# Patient Record
Sex: FEMALE | Race: WHITE | NOT HISPANIC OR LATINO | Employment: OTHER | URBAN - METROPOLITAN AREA
[De-identification: names, ages, dates, MRNs, and addresses within clinical notes are randomized per-mention and may not be internally consistent; named-entity substitution may affect disease eponyms.]

---

## 2019-01-23 ENCOUNTER — TELEPHONE (OUTPATIENT)
Dept: INTERNAL MEDICINE CLINIC | Facility: CLINIC | Age: 59
End: 2019-01-23

## 2019-01-23 NOTE — TELEPHONE ENCOUNTER
SHE STATED THAT SHE HAS A LOT OF LITTLE ISSUES BUT THAT IT'S BEEN A VERY LONG TIME SINCE SHE'S HAD AN ACTUAL PHYSICAL     GAVE APPT FOR Monday AM, SHE STATED THAT SHE'LL BE HERE

## 2019-01-28 ENCOUNTER — OFFICE VISIT (OUTPATIENT)
Dept: INTERNAL MEDICINE CLINIC | Facility: CLINIC | Age: 59
End: 2019-01-28
Payer: MEDICARE

## 2019-01-28 VITALS
TEMPERATURE: 98.6 F | DIASTOLIC BLOOD PRESSURE: 84 MMHG | OXYGEN SATURATION: 98 % | HEART RATE: 86 BPM | RESPIRATION RATE: 16 BRPM | BODY MASS INDEX: 49.67 KG/M2 | HEIGHT: 60 IN | WEIGHT: 253 LBS | SYSTOLIC BLOOD PRESSURE: 126 MMHG

## 2019-01-28 DIAGNOSIS — M19.90 ARTHRITIS: ICD-10-CM

## 2019-01-28 DIAGNOSIS — Z12.39 SCREENING FOR BREAST CANCER: ICD-10-CM

## 2019-01-28 DIAGNOSIS — E11.9 TYPE 2 DIABETES MELLITUS WITHOUT COMPLICATION, WITHOUT LONG-TERM CURRENT USE OF INSULIN (HCC): Primary | ICD-10-CM

## 2019-01-28 DIAGNOSIS — Z13.220 SCREENING FOR HYPERLIPIDEMIA: ICD-10-CM

## 2019-01-28 DIAGNOSIS — B37.3 YEAST INFECTION INVOLVING THE VAGINA AND SURROUNDING AREA: ICD-10-CM

## 2019-01-28 DIAGNOSIS — F33.41 RECURRENT MAJOR DEPRESSIVE DISORDER, IN PARTIAL REMISSION (HCC): ICD-10-CM

## 2019-01-28 DIAGNOSIS — Z01.419 ENCOUNTER FOR ANNUAL ROUTINE GYNECOLOGICAL EXAMINATION: ICD-10-CM

## 2019-01-28 DIAGNOSIS — I25.119 CHEST PAIN DUE TO CAD (HCC): ICD-10-CM

## 2019-01-28 DIAGNOSIS — R93.9 DIAGNOSTIC IMAGING INCONCLUSIVE DUE TO EXCESS BODY FAT OF PATIENT: ICD-10-CM

## 2019-01-28 DIAGNOSIS — Z23 ENCOUNTER FOR IMMUNIZATION: ICD-10-CM

## 2019-01-28 DIAGNOSIS — Z12.11 SCREENING FOR COLON CANCER: ICD-10-CM

## 2019-01-28 DIAGNOSIS — Z87.01 HISTORY OF BACTERIAL PNEUMONIA: ICD-10-CM

## 2019-01-28 DIAGNOSIS — E55.9 VITAMIN D DEFICIENCY: ICD-10-CM

## 2019-01-28 DIAGNOSIS — E03.9 ACQUIRED HYPOTHYROIDISM: ICD-10-CM

## 2019-01-28 LAB — SL AMB POCT HEMOGLOBIN AIC: 8.6 (ref ?–6.5)

## 2019-01-28 PROCEDURE — 93000 ELECTROCARDIOGRAM COMPLETE: CPT | Performed by: INTERNAL MEDICINE

## 2019-01-28 PROCEDURE — 99205 OFFICE O/P NEW HI 60 MIN: CPT | Performed by: INTERNAL MEDICINE

## 2019-01-28 PROCEDURE — 90732 PPSV23 VACC 2 YRS+ SUBQ/IM: CPT

## 2019-01-28 PROCEDURE — G0009 ADMIN PNEUMOCOCCAL VACCINE: HCPCS

## 2019-01-28 PROCEDURE — 83036 HEMOGLOBIN GLYCOSYLATED A1C: CPT | Performed by: INTERNAL MEDICINE

## 2019-01-28 RX ORDER — METFORMIN HYDROCHLORIDE 750 MG/1
750 TABLET, EXTENDED RELEASE ORAL
Qty: 90 TABLET | Refills: 2 | Status: SHIPPED | OUTPATIENT
Start: 2019-01-28 | End: 2020-01-13 | Stop reason: SDUPTHER

## 2019-01-28 RX ORDER — FLUCONAZOLE 150 MG/1
150 TABLET ORAL ONCE
Qty: 1 TABLET | Refills: 0 | Status: SHIPPED | OUTPATIENT
Start: 2019-01-28 | End: 2019-01-28

## 2019-01-28 RX ORDER — FLUCONAZOLE 150 MG/1
150 TABLET ORAL ONCE
Qty: 1 TABLET | Refills: 0 | Status: CANCELLED | OUTPATIENT
Start: 2019-01-28 | End: 2019-01-28

## 2019-01-28 NOTE — PROGRESS NOTES
Assessment/Plan:    #DM  -A1c in office elevated 8 6  -reports that she has lost about 50 lbs over the past year  -will start on 750mg metformin daily  -advised patient to diet and exercise  -patient will return in 4 months with a1c recheck  -will need to consider adding on GLP-1 or SGLT-2  -states that she will see ophthalmology whenever her insurance card arrives  -diabetic foot exam to be performed at next visit    #Chest Pain  -reports intermittent midsternal pain every 3 months  -not associated with activity  -EKG reveals NSR 60 with poor R wave progression and low voltage  -will obtain chemical stress test    #Yeast Infection  -likely secondary to uncontrolled diabetes  -denies discharge however reports uncomfortable vaginal itching  -has been taking vagisil topical with positive results  -will start on fluconazole x1    #Lipoma  -noted on right forehead  -will continue to monitor    #Arthritis Hands  -tender joints with AM stiffness that improves with movement  -single nodule noted on index finger MCP  -patient deferred tylenol and NSAID therapy  -will obtain DIOGENES, anti RF, anti CCP and XR hands    #Hypothyroidism  -previously on levothyroxine  -will recheck TSH    #Depression  -significant history of  -previously underwent ECT, lithium therapy, wellbutrin, amitryptylline, xanax with psychiatry however states that it made her feel sedated and she stopped all meds  -denies suicidal or homicidal ideation  -deferred antidepressant use  -encouraged her to talk to her  and to remain active in her Jehovah's witness    #PNA  -hospitalized in the past  -PNA 23 vaccine administered today    #Right Knee Meniscus Surgery  -right knee cartilage damage secondary to weight  -encouraged weight loss    #Fibromyalgia  -reports diffuse body pain and had been on amitryptylline however will continue to monitor off medications    #Previous Tracheostomy as Infant  -previous scar noted    #Health Maintenance  -return to care 4 months with a1c recheck in office  -deferred flu vaccine  -PNA 23 vaccine administered today  -mammogram, colonoscopy, ob/gyn referral given to patient    Addendum 2/4/19 mammogram with 3D and cad and US reveals 2 focal asymmetries in RUQ of right breast  She will need repeat mammogram in 6 months (August 2019)  Voicemail left to inform patient of need for repeat testing  Addendum 5/14/19 patient was seen by ophthalmology and will continue with yearly monitoring  No problem-specific Assessment & Plan notes found for this encounter  Diagnoses and all orders for this visit:    Type 2 diabetes mellitus without complication, without long-term current use of insulin (HCC)  -     CBC and differential; Future  -     Vitamin D 25 hydroxy; Future  -     Lipid Panel with Direct LDL reflex; Future  -     TSH, 3rd generation with Free T4 reflex; Future  -     Comprehensive metabolic panel; Future  -     POCT hemoglobin A1c  -     metFORMIN (GLUCOPHAGE-XR) 750 mg 24 hr tablet; Take 1 tablet (750 mg total) by mouth daily with breakfast for 90 days    Arthritis  -     CBC and differential; Future  -     Vitamin D 25 hydroxy; Future  -     Lipid Panel with Direct LDL reflex; Future  -     TSH, 3rd generation with Free T4 reflex; Future  -     Comprehensive metabolic panel; Future  -     POCT hemoglobin A1c  -     XR hand 2 vw left; Future  -     XR hand 2 vw right; Future  -     Cyclic citrul peptide antibody, IgG; Future  -     RF Screen w/ Reflex to Titer; Future  -     DIOGENES SCR, IFA W/REFL TITER/ PATTERN/MCT PNL 1; Future    Recurrent major depressive disorder, in partial remission (HCC)  -     CBC and differential; Future  -     Vitamin D 25 hydroxy; Future  -     Lipid Panel with Direct LDL reflex; Future  -     TSH, 3rd generation with Free T4 reflex; Future  -     Comprehensive metabolic panel;  Future  -     POCT hemoglobin A1c    Acquired hypothyroidism  -     CBC and differential; Future  -     Vitamin D 25 hydroxy; Future  -     Lipid Panel with Direct LDL reflex; Future  -     TSH, 3rd generation with Free T4 reflex; Future  -     Comprehensive metabolic panel; Future  -     POCT hemoglobin A1c    History of bacterial pneumonia  -     CBC and differential; Future  -     Vitamin D 25 hydroxy; Future  -     Lipid Panel with Direct LDL reflex; Future  -     TSH, 3rd generation with Free T4 reflex; Future  -     Comprehensive metabolic panel; Future  -     POCT hemoglobin A1c  -     PNEUMOCOCCAL POLYSACCHARIDE VACCINE 23-VALENT =>3YO SQ IM  -     Mammo diagnostic bilateral w cad; Future    Screening for breast cancer  -     CBC and differential; Future  -     Vitamin D 25 hydroxy; Future  -     Lipid Panel with Direct LDL reflex; Future  -     TSH, 3rd generation with Free T4 reflex; Future  -     Comprehensive metabolic panel; Future  -     POCT hemoglobin A1c    Screening for colon cancer  -     CBC and differential; Future  -     Vitamin D 25 hydroxy; Future  -     Lipid Panel with Direct LDL reflex; Future  -     TSH, 3rd generation with Free T4 reflex; Future  -     Comprehensive metabolic panel; Future  -     POCT hemoglobin A1c  -     Ambulatory referral to Colorectal Surgery; Future    Vitamin D deficiency  -     CBC and differential; Future  -     Vitamin D 25 hydroxy; Future  -     Lipid Panel with Direct LDL reflex; Future  -     TSH, 3rd generation with Free T4 reflex; Future  -     Comprehensive metabolic panel; Future  -     POCT hemoglobin A1c    Chest pain due to CAD  -     CBC and differential; Future  -     Vitamin D 25 hydroxy; Future  -     Lipid Panel with Direct LDL reflex; Future  -     TSH, 3rd generation with Free T4 reflex; Future  -     Comprehensive metabolic panel; Future  -     POCT hemoglobin A1c  -     POCT ECG  -     Echo stress test w contrast if indicated; Future    Screening for hyperlipidemia  -     CBC and differential; Future  -     Vitamin D 25 hydroxy;  Future  -     Lipid Panel with Direct LDL reflex; Future  -     TSH, 3rd generation with Free T4 reflex; Future  -     Comprehensive metabolic panel; Future  -     POCT hemoglobin A1c    Encounter for annual routine gynecological examination  -     CBC and differential; Future  -     Vitamin D 25 hydroxy; Future  -     Lipid Panel with Direct LDL reflex; Future  -     TSH, 3rd generation with Free T4 reflex; Future  -     Comprehensive metabolic panel; Future  -     POCT hemoglobin A1c  -     Ambulatory referral to Obstetrics / Gynecology; Future    Yeast infection involving the vagina and surrounding area  -     fluconazole (DIFLUCAN) 150 mg tablet; Take 1 tablet (150 mg total) by mouth once for 1 dose    Encounter for immunization   -     PNEUMOCOCCAL POLYSACCHARIDE VACCINE 23-VALENT =>3YO SQ IM    Diagnostic imaging inconclusive due to excess body fat of patient   -     Mammo diagnostic bilateral w cad; Future    Other orders  -     Cancel: fluconazole (DIFLUCAN) 150 mg tablet; Take 1 tablet (150 mg total) by mouth once for 1 dose            Current Outpatient Prescriptions:     fluconazole (DIFLUCAN) 150 mg tablet, Take 1 tablet (150 mg total) by mouth once for 1 dose, Disp: 1 tablet, Rfl: 0    metFORMIN (GLUCOPHAGE-XR) 750 mg 24 hr tablet, Take 1 tablet (750 mg total) by mouth daily with breakfast for 90 days, Disp: 90 tablet, Rfl: 2    Subjective:      Patient ID: Jennifer Archer is a 63 y o  female  HPI     Patient presents as a new patient visit  Reports that she has approved wrong history of depression and had been on electroconvulsive therapy with her psychiatrist   Also reports that she had been on lithium in the past with Wellbutrin as well as Xanax and amitriptyline however she discontinued all these medications stating that they made her feel sedated  She states that she continues to amin depression on a daily basis however denies any suicide or homicidal ideation    We offered to resume her on her previous antidepressants however she deferred at this time stating that she does not want anything  We encouraged her to continue to discuss her issues with her  and to discuss any depressive symptoms that she may have  Patient also notes a history of fibromyalgia and had been on amitriptyline in the past however never saw a rheumatologist   She states that this was controlled by her primary care  Patient also reports a history of vaginal yeast infection and denies any discharge at this time  She states that she is currently on nystatin topical cream which is cleaning out the area and making it feel better  She states that she has significant pruritus  We will start her on fluconazole at this time to see if this will give her relief  Patient's A1c was elevated at 8 6 today patient has a previous history of diabetes which she was previously on metformin  We will resume her metformin at this time and encouraged her to diet and exercise  Patient reports that she is currently cutting back on carbohydrates  Patient also complains of bilateral wrist pain with a nodule on her MCP of her right index finger  We will obtain x-ray imaging and as well as RF, CCP, DIOGENES levels  Patient deferred any pain medication including over-the-counter Tylenol or NSAIDs at this time  She states that she can't tolerate the pain  Patient also reports a history of pneumonia and we will administer the Pneumovax vaccine today  Patient also reports a history of intermittent chest pain  She states that it comes intermittently approximately every 3 months and is not relieved with rest or with massage  EKG in the office today reveals normal sinus rhythm with poor R-wave progression and low voltage  We will obtain a chemical stress test     Patient's past medical history positive for depression, diabetes, hypothyroidism, pneumonia, fibromyalgia  She is not currently on any medications  She has allergies to Wellbutrin and penicillin    Surgery wise she had a tracheostomy when she was an infant as well as right medial meniscus surgery  She reports a family history with a brother with asthma, and a father with pneumonitis  Social history is positive for alcohol approximately 1-2 drinks per week however she states that she quit smoking years ago and denies any drug use  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,   Currently patient reports that she is not exercising and we encouraged her to walk at least 30 min every day  She states that she is eating a well-balanced diet and cutting back on carbohydrates and sugars  We offered to administer the flu vaccine today however patient deferred  Patient reports that she has not received a pneumonia vaccine and we gave her the Pneumovax today  Patient also has not had her colonoscopy completed yet and we gave her the referral for that  Patient has never obtained a mammogram before and we will give her referral for that  She has not seen OB Gyne in many years for her annual Pap and pelvic exam and we will refer her to OB Gyne at this time  Patient will return in 4 months with A1c to be checked in the office  The following portions of the patient's history were reviewed and updated as appropriate: allergies, current medications, past family history, past medical history, past social history, past surgical history and problem list     Review of Systems   Constitutional: Negative for activity change, appetite change, chills, fatigue and fever  HENT: Negative for congestion, ear pain, facial swelling, hearing loss, sore throat, tinnitus and trouble swallowing  Eyes: Negative for photophobia and visual disturbance  Respiratory: Negative for cough, shortness of breath and wheezing  Cardiovascular: Positive for chest pain  Negative for leg swelling  Gastrointestinal: Negative for abdominal distention, abdominal pain, blood in stool, nausea and vomiting  Genitourinary: Negative for difficulty urinating, dysuria, pelvic pain, vaginal bleeding, vaginal discharge and vaginal pain  Vaginal itch   Musculoskeletal: Positive for arthralgias (b/l hand pain with nodule on right index finger), back pain (chronic lower back pain) and myalgias (fibromyalgia)  Negative for gait problem, joint swelling, neck pain and neck stiffness  Skin: Negative for rash and wound  Nodule on forehead   Neurological: Negative for dizziness, tremors, light-headedness and headaches  Psychiatric/Behavioral: Negative for suicidal ideas  The patient is not nervous/anxious  Depression         Objective:      /84   Pulse 86   Temp 98 6 °F (37 °C) (Tympanic)   Resp 16   Ht 5' (1 524 m)   Wt 115 kg (253 lb)   SpO2 98%   BMI 49 41 kg/m²          Physical Exam   Constitutional: She is oriented to person, place, and time  She appears well-developed and well-nourished  HENT:   Head: Normocephalic and atraumatic  Right Ear: External ear normal    Left Ear: External ear normal    Nose: Nose normal    Mouth/Throat: Oropharynx is clear and moist    Eyes: Pupils are equal, round, and reactive to light  Conjunctivae and EOM are normal    Neck: Normal range of motion  Neck supple  No JVD present  No thyromegaly present  Cardiovascular: Normal rate, regular rhythm and intact distal pulses  No murmur heard  Diminished heart sounds   Pulmonary/Chest: Effort normal and breath sounds normal  No stridor  No respiratory distress  She has no wheezes  She exhibits no tenderness  Abdominal: Soft  Bowel sounds are normal  She exhibits no distension  There is no tenderness  There is no rebound and no guarding  Musculoskeletal: Normal range of motion  She exhibits tenderness (b/l MCP hands with nodule on right index finger, lumbar pain, right knee medial meniscus pain)  She exhibits no edema or deformity  Lymphadenopathy:     She has no cervical adenopathy  Neurological: She is alert and oriented to person, place, and time  She has normal reflexes  She displays normal reflexes  No cranial nerve deficit  Coordination normal    Skin: Skin is warm  No rash noted  No erythema  Vitals reviewed

## 2019-01-29 ENCOUNTER — HOSPITAL ENCOUNTER (OUTPATIENT)
Dept: MAMMOGRAPHY | Facility: CLINIC | Age: 59
Discharge: HOME | End: 2019-01-29
Payer: COMMERCIAL

## 2019-01-29 VITALS — WEIGHT: 253 LBS | BODY MASS INDEX: 49.67 KG/M2 | HEIGHT: 60 IN

## 2019-01-29 DIAGNOSIS — Z12.39 SCREENING BREAST EXAMINATION: ICD-10-CM

## 2019-01-29 PROCEDURE — 77067 SCR MAMMO BI INCL CAD: CPT

## 2019-01-29 PROCEDURE — 77063 BREAST TOMOSYNTHESIS BI: CPT

## 2019-02-01 ENCOUNTER — HOSPITAL ENCOUNTER (OUTPATIENT)
Dept: ULTRASOUND IMAGING | Facility: CLINIC | Age: 59
Discharge: HOME | End: 2019-02-01
Payer: COMMERCIAL

## 2019-02-01 ENCOUNTER — HOSPITAL ENCOUNTER (OUTPATIENT)
Dept: MAMMOGRAPHY | Facility: CLINIC | Age: 59
Discharge: HOME | End: 2019-02-01
Payer: COMMERCIAL

## 2019-02-01 VITALS — BODY MASS INDEX: 49.67 KG/M2 | WEIGHT: 253 LBS | HEIGHT: 60 IN

## 2019-02-01 DIAGNOSIS — R92.8 ABNORMAL MAMMOGRAM: ICD-10-CM

## 2019-02-01 PROCEDURE — 77065 DX MAMMO INCL CAD UNI: CPT

## 2019-02-01 PROCEDURE — G0279 TOMOSYNTHESIS, MAMMO: HCPCS

## 2019-02-01 PROCEDURE — 76642 ULTRASOUND BREAST LIMITED: CPT

## 2019-02-04 PROBLEM — R93.9 DIAGNOSTIC IMAGING INCONCLUSIVE DUE TO EXCESS BODY FAT OF PATIENT: Status: ACTIVE | Noted: 2019-02-04

## 2019-05-10 LAB
LEFT EYE DIABETIC RETINOPATHY: NORMAL
RIGHT EYE DIABETIC RETINOPATHY: NORMAL

## 2019-05-24 PROBLEM — E66.01 MORBID OBESITY (HCC): Status: ACTIVE | Noted: 2019-05-24

## 2020-01-13 ENCOUNTER — OFFICE VISIT (OUTPATIENT)
Dept: INTERNAL MEDICINE CLINIC | Facility: CLINIC | Age: 60
End: 2020-01-13
Payer: COMMERCIAL

## 2020-01-13 VITALS
HEART RATE: 75 BPM | RESPIRATION RATE: 16 BRPM | HEIGHT: 60 IN | BODY MASS INDEX: 49.39 KG/M2 | WEIGHT: 251.6 LBS | DIASTOLIC BLOOD PRESSURE: 76 MMHG | OXYGEN SATURATION: 97 % | SYSTOLIC BLOOD PRESSURE: 126 MMHG

## 2020-01-13 DIAGNOSIS — Z01.419 ENCOUNTER FOR ANNUAL ROUTINE GYNECOLOGICAL EXAMINATION: ICD-10-CM

## 2020-01-13 DIAGNOSIS — E78.01 FAMILIAL HYPERCHOLESTEROLEMIA: ICD-10-CM

## 2020-01-13 DIAGNOSIS — M25.50 ARTHRALGIA, UNSPECIFIED JOINT: Primary | ICD-10-CM

## 2020-01-13 DIAGNOSIS — Z23 INFLUENZA VACCINE NEEDED: ICD-10-CM

## 2020-01-13 DIAGNOSIS — G47.33 OSA (OBSTRUCTIVE SLEEP APNEA): ICD-10-CM

## 2020-01-13 DIAGNOSIS — Z12.39 SCREENING FOR BREAST CANCER: ICD-10-CM

## 2020-01-13 DIAGNOSIS — M05.79 RHEUMATOID ARTHRITIS INVOLVING MULTIPLE SITES WITH POSITIVE RHEUMATOID FACTOR (HCC): ICD-10-CM

## 2020-01-13 DIAGNOSIS — E11.9 TYPE 2 DIABETES MELLITUS WITHOUT COMPLICATION, WITHOUT LONG-TERM CURRENT USE OF INSULIN (HCC): ICD-10-CM

## 2020-01-13 DIAGNOSIS — L60.0 INGROWN TOENAIL OF BOTH FEET: ICD-10-CM

## 2020-01-13 DIAGNOSIS — Z13.29 SCREENING FOR THYROID DISORDER: ICD-10-CM

## 2020-01-13 DIAGNOSIS — Z12.11 SCREENING FOR COLON CANCER: ICD-10-CM

## 2020-01-13 DIAGNOSIS — Z13.220 SCREENING FOR HYPERLIPIDEMIA: ICD-10-CM

## 2020-01-13 DIAGNOSIS — B16.9 ACUTE VIRAL HEPATITIS B WITHOUT COMA AND WITHOUT DELTA AGENT: ICD-10-CM

## 2020-01-13 DIAGNOSIS — Z11.59 ENCOUNTER FOR HEPATITIS C SCREENING TEST FOR LOW RISK PATIENT: ICD-10-CM

## 2020-01-13 PROCEDURE — 99215 OFFICE O/P EST HI 40 MIN: CPT | Performed by: INTERNAL MEDICINE

## 2020-01-13 PROCEDURE — 3008F BODY MASS INDEX DOCD: CPT | Performed by: INTERNAL MEDICINE

## 2020-01-13 RX ORDER — METFORMIN HYDROCHLORIDE 750 MG/1
750 TABLET, EXTENDED RELEASE ORAL
Qty: 90 TABLET | Refills: 2 | Status: SHIPPED | OUTPATIENT
Start: 2020-01-13 | End: 2021-01-16

## 2020-01-13 RX ORDER — TRAMADOL HYDROCHLORIDE 50 MG/1
50 TABLET ORAL EVERY 8 HOURS PRN
Qty: 60 TABLET | Refills: 0 | Status: SHIPPED | OUTPATIENT
Start: 2020-01-13 | End: 2022-04-14 | Stop reason: SDUPTHER

## 2020-01-13 NOTE — PROGRESS NOTES
Assessment/Plan:    #DM  -a1c previously at 8 6  -has not been dieting or exercising  -encouraged her to cut out carbohydrates  -fasting blood sugar 247 and elevated  -will repeat a1c  -refer to diabetic education class  -states that she is noncompliant with metformin  -refer to ophthalmology for annual eye exam  -diabetic foot exam benign    #Ingrown Toenails  -noted on b/l feet  -refer to podiatry    #BRONWYN  -symptoms concerning for with snoring, apnea episodes witness by ,     #Chest Pain  -reports intermittent midsternal pain every 3 months  -not associated with activity  -EKG reveals NSR 60 with poor R wave progression and low voltage  -has not done stress test and will need to readdress at next visit    #Yeast Infection  -previously treated with fluconazole    #Lipoma  -noted on right forehead  -remains stable    #Arthritis Hands  -noted in hands and legs and back  -will obtain DIOGENES, RF, CCP, XR right hand  -nodule noted over MCP right hand    #Hypothyroidism  -previously on levothyroxine  -awaiting repeat TSH    #Depression  -previous history of  -stable at this time  -previously on ECT, lithium, wellbutrin, amitryptylline, xanax    #PNA  -hospitalized in the past  -PNA 23 vaccine up to date    #Right Knee Meniscus Surgery  -right knee cartilage damage secondary to weight  -recommended exercise and weight loss    #Fibromyalgia  -reports symptoms of   -will obtain CCP, RF, DIOGENES    #Previous Tracheostomy as Infant  -previous scar noted    #Health Maintenance  -return to care 4 months with labs  -flu vaccine 1/2020  -PNA 23 vaccine 2018  -mammogram, colonoscopy, ob/gyn referral given to patient    Addendum 01/14/2020 A1c 9, microalbumin creatinine ratio 14, TSH 2 95, , HDL 85, will start on 20 mg atorvastatin  Rheumatoid factor positive and refer to rheumatology  HepB core antigen IGM positive and will refer to GI  Will start on ozempic for better glycemic control    X-ray right hand unremarkable    BMI Counseling: Body mass index is 49 14 kg/m²  The BMI is above normal  Nutrition recommendations include 3-5 servings of fruits/vegetables daily, reducing fast food intake and consuming healthier snacks  Exercise recommendations include vigorous aerobic physical activity for 75 minutes/week and exercising 3-5 times per week  Addendum 02/13/2020 patient was seen by rheumatology and will start prednisone taper as well as Plaquenil 200 mg twice daily  No problem-specific Assessment & Plan notes found for this encounter  Diagnoses and all orders for this visit:    Arthralgia, unspecified joint  -     CBC and differential  -     Comprehensive metabolic panel  -     DIOGENES Screen w/ Reflex to Titer/Pattern  -     RF Screen w/ Reflex to Titer  -     Cyclic citrul peptide antibody, IgG  -     XR hand 3+ vw right; Future  -     traMADol (ULTRAM) 50 mg tablet; Take 1 tablet (50 mg total) by mouth every 8 (eight) hours as needed for severe pain    Type 2 diabetes mellitus without complication, without long-term current use of insulin (HCC)  -     Cancel: Microalbumin / creatinine urine ratio  -     CBC and differential  -     Hemoglobin A1C  -     Comprehensive metabolic panel  -     metFORMIN (GLUCOPHAGE-XR) 750 mg 24 hr tablet; Take 1 tablet (750 mg total) by mouth daily with breakfast  -     Ambulatory referral to Diabetic Education; Future  -     Ambulatory Referral to Ophthalmology; Future  -     Microalbumin / creatinine urine ratio    Encounter for annual routine gynecological examination  -     Ambulatory referral to Gynecology; Future  -     CBC and differential  -     Comprehensive metabolic panel    Ingrown toenail of both feet  -     Ambulatory referral to Podiatry; Future    Screening for colon cancer  -     Ambulatory referral for colonoscopy;  Future    Screening for hyperlipidemia  -     Lipid Panel with Direct LDL reflex    Screening for thyroid disorder  -     TSH, 3rd generation with Free T4 reflex    Influenza vaccine needed  -     Cancel: influenza vaccine, 2416-2068, quadrivalent, recombinant, PF, 0 5 mL, for patients 18 yr+ (FLUBLOK)    Screening for breast cancer  -     Mammo screening bilateral w 3d & cad; Future    Encounter for hepatitis C screening test for low risk patient  -     Chronic Hepatitis Panel    BRONWYN (obstructive sleep apnea)  -     Home Study; Future    Other orders  -     Cancel: POCT hemoglobin A1c  -     Cancel: Rapid HIV 1/2 AB-AG Combo; Future  -     Cancel: Hepatitis C antibody; Future            Current Outpatient Medications:     metFORMIN (GLUCOPHAGE-XR) 750 mg 24 hr tablet, Take 1 tablet (750 mg total) by mouth daily with breakfast, Disp: 90 tablet, Rfl: 2    traMADol (ULTRAM) 50 mg tablet, Take 1 tablet (50 mg total) by mouth every 8 (eight) hours as needed for severe pain, Disp: 60 tablet, Rfl: 0    Subjective:      Patient ID: Ansatasiya Contreras is a 63 y o  female  HPI     Patient presents for routine visit  She has skipped her previous multiple appointments  Last time her A1c was noted to be elevated at 8 6 and she was started on metformin encouraged to diet and exercise  She states that she is not dieting or exercising and her blood fasting glucose was 247 today  We will wait repeat A1c to determine if additional medical therapy is required  We will refer her to a diabetic education class  Diabetic foot exam was benign today  We noted ingrown toenails and will refer her to Podiatry  She is also due for an ophthalmology eye exam and we gave her the referral for that  Patient continues to have diffuse arthritic type pain all over her joints throughout her body  We will obtain an DIOGENES as well as anti rheumatoid factor, CCP and x-ray of her hands  We will start her on low-dose tramadol to see if this would provide her with any temper relief  Patient is due to see the gynecologist and we will give her the referral for that  She is also due for sleep study  Patient reports that she has periodic episodes as snoring at night and has apneic episodes witnessed by her   She reports that she has fatigue and a m  Headaches on occasion  We will obtain a sleep study  Patient received her flu vaccine today  She is due for repeat mammogram with 3D and we gave her the referral for that  She will return to care in 4 months  The following portions of the patient's history were reviewed and updated as appropriate: allergies, current medications, past family history, past medical history, past social history, past surgical history and problem list     Review of Systems   Constitutional: Negative for activity change, appetite change, chills, fatigue and fever  HENT: Negative for congestion, ear pain, facial swelling, hearing loss, sore throat, tinnitus and trouble swallowing  Eyes: Negative for photophobia and visual disturbance  Respiratory: Negative for cough, shortness of breath and wheezing  Cardiovascular: Negative for chest pain, palpitations and leg swelling  Gastrointestinal: Negative for abdominal distention, abdominal pain, blood in stool, constipation, diarrhea, nausea and vomiting  Genitourinary: Negative for difficulty urinating, dysuria and pelvic pain  Musculoskeletal: Positive for arthralgias (hands, legs), back pain (lower back) and myalgias (diffuse)  Negative for gait problem, joint swelling (nodule over right pointing finger), neck pain and neck stiffness  Skin: Negative for rash and wound  Nodule on forehead   Neurological: Negative for dizziness, tremors, light-headedness and headaches  Objective:      /76 (BP Location: Left arm, Patient Position: Sitting, Cuff Size: Large)   Pulse 75   Resp 16   Ht 5' (1 524 m)   Wt 114 kg (251 lb 9 6 oz)   SpO2 97%   BMI 49 14 kg/m²          Physical Exam   Constitutional: She is oriented to person, place, and time  She appears well-developed and well-nourished     HENT: Head: Normocephalic and atraumatic  Right Ear: External ear normal    Left Ear: External ear normal    Nose: Nose normal    Mouth/Throat: Oropharynx is clear and moist    Eyes: Pupils are equal, round, and reactive to light  Conjunctivae and EOM are normal    Neck: Normal range of motion  Neck supple  No JVD present  No thyromegaly present  Cardiovascular: Normal rate, regular rhythm and intact distal pulses  No murmur heard  Diminished heart sounds   Pulmonary/Chest: Effort normal and breath sounds normal  No stridor  No respiratory distress  She has no wheezes  Abdominal: Soft  Bowel sounds are normal  She exhibits no distension and no mass  There is no tenderness  There is no rebound and no guarding  Musculoskeletal: Normal range of motion  She exhibits tenderness (diffuse joints) and deformity (right index finger nodule)  She exhibits no edema  Lymphadenopathy:     She has no cervical adenopathy  Neurological: She is alert and oriented to person, place, and time  She has normal reflexes  Skin: Skin is warm and dry  No rash noted  No erythema  No pallor  Forehead skin nodule   Vitals reviewed

## 2020-01-14 ENCOUNTER — APPOINTMENT (OUTPATIENT)
Dept: LAB | Facility: HOSPITAL | Age: 60
End: 2020-01-14
Payer: COMMERCIAL

## 2020-01-14 ENCOUNTER — HOSPITAL ENCOUNTER (OUTPATIENT)
Dept: RADIOLOGY | Facility: HOSPITAL | Age: 60
Discharge: HOME | End: 2020-01-14
Payer: COMMERCIAL

## 2020-01-14 ENCOUNTER — TRANSCRIBE ORDERS (OUTPATIENT)
Dept: RADIOLOGY | Facility: HOSPITAL | Age: 60
End: 2020-01-14

## 2020-01-14 DIAGNOSIS — E55.9 VITAMIN D DEFICIENCY: ICD-10-CM

## 2020-01-14 DIAGNOSIS — E55.9 VITAMIN D DEFICIENCY: Primary | ICD-10-CM

## 2020-01-14 DIAGNOSIS — Z01.419 ENCOUNTER FOR ANNUAL ROUTINE GYNECOLOGICAL EXAMINATION: ICD-10-CM

## 2020-01-14 DIAGNOSIS — Z87.01 HISTORY OF BACTERIAL PNEUMONIA: ICD-10-CM

## 2020-01-14 DIAGNOSIS — E11.9 TYPE 2 DIABETES MELLITUS WITHOUT COMPLICATION, WITHOUT LONG-TERM CURRENT USE OF INSULIN (HCC): ICD-10-CM

## 2020-01-14 DIAGNOSIS — Z13.220 SCREENING FOR HYPERLIPIDEMIA: ICD-10-CM

## 2020-01-14 DIAGNOSIS — F33.41 RECURRENT MAJOR DEPRESSIVE DISORDER, IN PARTIAL REMISSION (HCC): ICD-10-CM

## 2020-01-14 DIAGNOSIS — E03.9 ACQUIRED HYPOTHYROIDISM: ICD-10-CM

## 2020-01-14 DIAGNOSIS — M25.50 ARTHRALGIA, UNSPECIFIED JOINT: ICD-10-CM

## 2020-01-14 DIAGNOSIS — Z12.39 SCREENING FOR BREAST CANCER: ICD-10-CM

## 2020-01-14 DIAGNOSIS — M19.90 ARTHRITIS: ICD-10-CM

## 2020-01-14 DIAGNOSIS — Z12.11 SCREENING FOR COLON CANCER: ICD-10-CM

## 2020-01-14 DIAGNOSIS — I25.119 CHEST PAIN DUE TO CAD (HCC): ICD-10-CM

## 2020-01-14 LAB
25(OH)D3 SERPL-MCNC: 12 NG/ML (ref 30–100)
ALBUMIN SERPL BCP-MCNC: 3.4 G/DL (ref 3.5–5)
ALP SERPL-CCNC: 183 U/L (ref 46–116)
ALT SERPL W P-5'-P-CCNC: 69 U/L (ref 12–78)
ANION GAP SERPL CALCULATED.3IONS-SCNC: 7 MMOL/L (ref 4–13)
AST SERPL W P-5'-P-CCNC: 50 U/L (ref 5–45)
BASOPHILS # BLD AUTO: 0.04 THOUSANDS/ΜL (ref 0–0.1)
BASOPHILS NFR BLD AUTO: 1 % (ref 0–1)
BILIRUB SERPL-MCNC: 1.11 MG/DL (ref 0.2–1)
BUN SERPL-MCNC: 12 MG/DL (ref 5–25)
CALCIUM SERPL-MCNC: 8.8 MG/DL (ref 8.3–10.1)
CHLORIDE SERPL-SCNC: 107 MMOL/L (ref 100–108)
CHOLEST SERPL-MCNC: 217 MG/DL (ref 50–200)
CO2 SERPL-SCNC: 22 MMOL/L (ref 21–32)
CREAT SERPL-MCNC: 0.65 MG/DL (ref 0.6–1.3)
CREAT UR-MCNC: 236 MG/DL
EOSINOPHIL # BLD AUTO: 0.08 THOUSAND/ΜL (ref 0–0.61)
EOSINOPHIL NFR BLD AUTO: 1 % (ref 0–6)
ERYTHROCYTE [DISTWIDTH] IN BLOOD BY AUTOMATED COUNT: 11.7 % (ref 11.6–15.1)
EST. AVERAGE GLUCOSE BLD GHB EST-MCNC: 212 MG/DL
GFR SERPL CREATININE-BSD FRML MDRD: 98 ML/MIN/1.73SQ M
GLUCOSE P FAST SERPL-MCNC: 275 MG/DL (ref 65–99)
HBA1C MFR BLD: 9 % (ref 4.2–6.3)
HCT VFR BLD AUTO: 43.4 % (ref 34.8–46.1)
HDLC SERPL-MCNC: 85 MG/DL
HGB BLD-MCNC: 14.7 G/DL (ref 11.5–15.4)
IMM GRANULOCYTES # BLD AUTO: 0.01 THOUSAND/UL (ref 0–0.2)
IMM GRANULOCYTES NFR BLD AUTO: 0 % (ref 0–2)
LDLC SERPL CALC-MCNC: 116 MG/DL (ref 0–100)
LYMPHOCYTES # BLD AUTO: 1.67 THOUSANDS/ΜL (ref 0.6–4.47)
LYMPHOCYTES NFR BLD AUTO: 26 % (ref 14–44)
MCH RBC QN AUTO: 30.1 PG (ref 26.8–34.3)
MCHC RBC AUTO-ENTMCNC: 33.9 G/DL (ref 31.4–37.4)
MCV RBC AUTO: 89 FL (ref 82–98)
MICROALBUMIN UR-MCNC: 32.3 MG/L (ref 0–20)
MICROALBUMIN/CREAT 24H UR: 14 MG/G CREATININE (ref 0–30)
MONOCYTES # BLD AUTO: 0.41 THOUSAND/ΜL (ref 0.17–1.22)
MONOCYTES NFR BLD AUTO: 6 % (ref 4–12)
NEUTROPHILS # BLD AUTO: 4.21 THOUSANDS/ΜL (ref 1.85–7.62)
NEUTS SEG NFR BLD AUTO: 66 % (ref 43–75)
NRBC BLD AUTO-RTO: 0 /100 WBCS
PLATELET # BLD AUTO: 112 THOUSANDS/UL (ref 149–390)
PMV BLD AUTO: 14.4 FL (ref 8.9–12.7)
POTASSIUM SERPL-SCNC: 4 MMOL/L (ref 3.5–5.3)
PROT SERPL-MCNC: 7.3 G/DL (ref 6.4–8.2)
RBC # BLD AUTO: 4.88 MILLION/UL (ref 3.81–5.12)
SODIUM SERPL-SCNC: 136 MMOL/L (ref 136–145)
TRIGL SERPL-MCNC: 82 MG/DL
TSH SERPL DL<=0.05 MIU/L-ACNC: 2.95 UIU/ML (ref 0.36–3.74)
WBC # BLD AUTO: 6.42 THOUSAND/UL (ref 4.31–10.16)

## 2020-01-14 PROCEDURE — 82306 VITAMIN D 25 HYDROXY: CPT

## 2020-01-14 PROCEDURE — 36415 COLL VENOUS BLD VENIPUNCTURE: CPT | Performed by: INTERNAL MEDICINE

## 2020-01-14 PROCEDURE — 85025 COMPLETE CBC W/AUTO DIFF WBC: CPT | Performed by: INTERNAL MEDICINE

## 2020-01-14 PROCEDURE — 80053 COMPREHEN METABOLIC PANEL: CPT | Performed by: INTERNAL MEDICINE

## 2020-01-14 PROCEDURE — 82570 ASSAY OF URINE CREATININE: CPT | Performed by: INTERNAL MEDICINE

## 2020-01-14 PROCEDURE — 86803 HEPATITIS C AB TEST: CPT | Performed by: INTERNAL MEDICINE

## 2020-01-14 PROCEDURE — 86430 RHEUMATOID FACTOR TEST QUAL: CPT | Performed by: INTERNAL MEDICINE

## 2020-01-14 PROCEDURE — 83036 HEMOGLOBIN GLYCOSYLATED A1C: CPT | Performed by: INTERNAL MEDICINE

## 2020-01-14 PROCEDURE — 86704 HEP B CORE ANTIBODY TOTAL: CPT | Performed by: INTERNAL MEDICINE

## 2020-01-14 PROCEDURE — 84443 ASSAY THYROID STIM HORMONE: CPT | Performed by: INTERNAL MEDICINE

## 2020-01-14 PROCEDURE — 80061 LIPID PANEL: CPT | Performed by: INTERNAL MEDICINE

## 2020-01-14 PROCEDURE — 73130 X-RAY EXAM OF HAND: CPT

## 2020-01-14 PROCEDURE — 3061F NEG MICROALBUMINURIA REV: CPT | Performed by: INTERNAL MEDICINE

## 2020-01-14 PROCEDURE — 86038 ANTINUCLEAR ANTIBODIES: CPT

## 2020-01-14 PROCEDURE — 86705 HEP B CORE ANTIBODY IGM: CPT | Performed by: INTERNAL MEDICINE

## 2020-01-14 PROCEDURE — 82043 UR ALBUMIN QUANTITATIVE: CPT | Performed by: INTERNAL MEDICINE

## 2020-01-14 PROCEDURE — 86200 CCP ANTIBODY: CPT | Performed by: INTERNAL MEDICINE

## 2020-01-14 PROCEDURE — 86431 RHEUMATOID FACTOR QUANT: CPT | Performed by: INTERNAL MEDICINE

## 2020-01-14 PROCEDURE — 87340 HEPATITIS B SURFACE AG IA: CPT | Performed by: INTERNAL MEDICINE

## 2020-01-14 PROCEDURE — 86235 NUCLEAR ANTIGEN ANTIBODY: CPT

## 2020-01-14 RX ORDER — ERGOCALCIFEROL 1.25 MG/1
50000 CAPSULE ORAL
Qty: 3 CAPSULE | Refills: 0 | Status: SHIPPED | OUTPATIENT
Start: 2020-01-14 | End: 2020-07-06

## 2020-01-14 RX ORDER — ATORVASTATIN CALCIUM 20 MG/1
20 TABLET, FILM COATED ORAL DAILY
Qty: 90 TABLET | Refills: 1 | Status: SHIPPED | OUTPATIENT
Start: 2020-01-14 | End: 2020-09-08 | Stop reason: SDUPTHER

## 2020-01-14 RX ORDER — MELATONIN
2000 DAILY
Qty: 180 TABLET | Refills: 1 | Status: SHIPPED | OUTPATIENT
Start: 2020-01-14 | End: 2020-08-26 | Stop reason: SDUPTHER

## 2020-01-14 NOTE — PROGRESS NOTES
Diabetic Foot Exam    Patient's shoes and socks removed  Right Foot/Ankle   Right Foot Inspection  Skin Exam: skin normal skin not intact, no dry skin, no warmth, no callus, no erythema, no maceration, no abnormal color, no pre-ulcer, no ulcer and no callus                          Toe Exam: ROM and strength within normal limitsno swelling, no tenderness, erythema and  no right toe deformity  Sensory   Vibration: intact  Proprioception: intact   Monofilament testing: intact  Vascular  Capillary refills: < 3 seconds  The right DP pulse is 1+  The right PT pulse is 1+  Right Toe  - Comprehensive Exam  Ecchymosis: none  Arch: normal  Hammertoes: absent  Claw Toes: absent  Swelling: none   Tenderness: none         Left Foot/Ankle  Left Foot Inspection  Skin Exam: skin normalskin not intact, no dry skin, no warmth, no erythema, no maceration, normal color, no pre-ulcer, no ulcer and no callus                         Toe Exam: ROM and strength within normal limitsno swelling, no tenderness, no erythema and no left toe deformity                   Sensory   Vibration: intact  Proprioception: intact  Monofilament: intact  Vascular  Capillary refills: < 3 seconds  The left DP pulse is 1+  The left PT pulse is 1+  Left Toe  - Comprehensive Exam  Ecchymosis: none  Arch: normal  Hammertoes: absent  Claw toes: absent  Swelling: none   Tenderness: none       Assign Risk Category:  No deformity present; No loss of protective sensation;  No weak pulses       Risk: 0

## 2020-01-15 LAB
CRYOGLOB RF SER-ACNC: ABNORMAL [IU]/ML
HBV CORE AB SER QL: ABNORMAL
HBV CORE IGM SER QL: REACTIVE
HBV SURFACE AG SER QL: ABNORMAL
HCV AB SER QL: ABNORMAL
RHEUMATOID FACT SER QL LA: POSITIVE

## 2020-01-16 ENCOUNTER — TELEPHONE (OUTPATIENT)
Dept: INTERNAL MEDICINE CLINIC | Facility: CLINIC | Age: 60
End: 2020-01-16

## 2020-01-16 LAB — CCP IGA+IGG SERPL IA-ACNC: >250 UNITS (ref 0–19)

## 2020-01-16 NOTE — TELEPHONE ENCOUNTER
Pt said that 1 tablet of the tramadol 50 mg is not working and asked if she could double up  She also said she didn't understand her bloodwork results and would appreciate a call explaining it to her  She also asked if she might be a candidate for Ozempic  Please advise

## 2020-01-17 LAB — MISCELLANEOUS LAB TEST RESULT: NORMAL

## 2020-01-20 ENCOUNTER — OFFICE VISIT (OUTPATIENT)
Dept: DIABETES SERVICES | Facility: CLINIC | Age: 60
End: 2020-01-20
Payer: COMMERCIAL

## 2020-01-20 DIAGNOSIS — E11.9 TYPE 2 DIABETES MELLITUS WITHOUT COMPLICATION, WITHOUT LONG-TERM CURRENT USE OF INSULIN (HCC): Primary | ICD-10-CM

## 2020-01-20 PROCEDURE — G0108 DIAB MANAGE TRN  PER INDIV: HCPCS | Performed by: DIETITIAN, REGISTERED

## 2020-01-20 NOTE — PROGRESS NOTES
Carbohydrate Counting Instruction    Met with Anastasiay Memory for carbohydrate counting  Lynne Roger is currently taking Metfonin  Patient instructed on: Carbohydrate counting  Instruction was provided using power point slides, food labels and an interactive carbohydrate counting activity  Patient appeared to comprehend the materials presented at the visit  Patient demonstrates good math skills and did well on the carbohydrate counting activity they completed at the visit  Patient agreed to keep a 3 day food record and return it in one week for assessment  Comments: Lynne Roger has good understanding of carbohydrate counting  Diabetes Education Record: Lynne Roger was given the following education material: Portions Booklet and food record  Patient response to instruction  Comprehension: good  Motivation: good  Expected Compliance: good  Readiness: action  Barriers to Learning: none known     Start- Stop: 3:24-4:25  Total Minutes: 60 Minutes  Group or Individual Instruction: DSMT-I  Other: Ernestina Pritchett, DO    Thank you for referring your patient to [facility], it was a pleasure working with them today  Please feel free to call with any questions or concerns      Herminio Schuster  [address] [place] [id number]

## 2020-02-13 RX ORDER — HYDROXYCHLOROQUINE SULFATE 200 MG/1
200 TABLET, FILM COATED ORAL 2 TIMES DAILY WITH MEALS
COMMUNITY

## 2020-02-25 LAB
LEFT EYE DIABETIC RETINOPATHY: NORMAL
RIGHT EYE DIABETIC RETINOPATHY: NORMAL

## 2020-03-06 ENCOUNTER — OFFICE VISIT (OUTPATIENT)
Dept: GASTROENTEROLOGY | Facility: CLINIC | Age: 60
End: 2020-03-06
Payer: COMMERCIAL

## 2020-03-06 VITALS
DIASTOLIC BLOOD PRESSURE: 68 MMHG | HEART RATE: 55 BPM | HEIGHT: 60 IN | SYSTOLIC BLOOD PRESSURE: 139 MMHG | WEIGHT: 245 LBS | TEMPERATURE: 97.9 F | BODY MASS INDEX: 48.1 KG/M2

## 2020-03-06 DIAGNOSIS — R79.89 ELEVATED LFTS: Primary | ICD-10-CM

## 2020-03-06 DIAGNOSIS — Z12.11 COLON CANCER SCREENING: ICD-10-CM

## 2020-03-06 DIAGNOSIS — R76.8 HEPATITIS B CORE ANTIBODY POSITIVE: ICD-10-CM

## 2020-03-06 DIAGNOSIS — K21.9 GASTROESOPHAGEAL REFLUX DISEASE WITHOUT ESOPHAGITIS: ICD-10-CM

## 2020-03-06 PROCEDURE — 2022F DILAT RTA XM EVC RTNOPTHY: CPT | Performed by: INTERNAL MEDICINE

## 2020-03-06 PROCEDURE — 3060F POS MICROALBUMINURIA REV: CPT | Performed by: INTERNAL MEDICINE

## 2020-03-06 PROCEDURE — 3046F HEMOGLOBIN A1C LEVEL >9.0%: CPT | Performed by: INTERNAL MEDICINE

## 2020-03-06 PROCEDURE — 99244 OFF/OP CNSLTJ NEW/EST MOD 40: CPT | Performed by: INTERNAL MEDICINE

## 2020-03-06 PROCEDURE — 1036F TOBACCO NON-USER: CPT | Performed by: INTERNAL MEDICINE

## 2020-03-06 RX ORDER — PREDNISONE 1 MG/1
TABLET ORAL
COMMUNITY
Start: 2020-02-06

## 2020-03-06 NOTE — PROGRESS NOTES
[facility] - Outpatient Consultation  Delmer Ortiz 63 y o  female MRN: [id number]  Encounter: [id number]          ASSESSMENT AND PLAN:  Ms Hailey Bates was seen today for reactive hepatitis B C IgM result  1  Positive hepatitis B C IgM result: Her hepatitis B C IgM was reactive 1/14/2020  Hepatitis B surface antigen and core antibody were negative and hepatitis C core antibody was negative  This is could be a false positive or due to previous exposure to viral hepatitis B  But no evidence of active infection  I will check her hepatitis B core and surface antibodies and hepatitis B DNA  2  Elevated liver function tests: Her LFTs are elevated: AST 50, ALT normal, alkaline phosphatase 183  She is currently working with [employer]  I will order iron panel; alpha-1-antitrypsin; DIOGENES screen; antimitochondrial antibody; anti-smooth muscle antibody, IgG; ceruloplasmin; CMP; IgG, IgA, IgM; tissue transglutaminase, IgA; protime-INR; hepatitis A antibody; hepatitis B surface antibody; liver/kidney microsomal antibody; hepatitis B core antibody and hepatitis B DNA  I will also order US RUQ and US elastography to assess her liver health  3  Gastroesophageal reflux disease: She has epigastric burning pain and reflux approximately once every 2 weeks, which she treats with baking soda  Due to longstanding symptoms, I will perform an EGD to assess for esophagitis, Gonsales's esophagus or hiatal hernia  We will discuss if she needs treatment with medication following EGD  4  Colon cancer screening: She has not had prior colonoscopy  I will schedule her for this as she is due at age 63  Risks and benefits of the procedure were discussed  Risks include but are not limited to bleeding, perforation, missed lesion  She is agreeable to the procedure  Bowel prep instructions given  Follow-up after endoscopy  ______________________________________________________________________    HPI:  Anastasiya Contreras is a 63 y o  female with diabetes, hypothyroidism and morbid obesity in the office today for evaluation and management of reactive hepatitis B C IgM result 1/14/2020  Hepatitis B surface antigen and core antibody negative, hepatitis C core antibody negative  Her LFTs are elevated: AST 50, ALT normal, alkaline phosphatase 183  She drinks wine approximately 1/2 bottle per month  She has epigastric burning pain and reflux approximately once every 2 weeks, which she treats with baking soda  As a baby, she had tracheostomy and blood transfusion for diptheria and pneumonia  She has not had prior colonoscopy  REVIEW OF SYSTEMS:    CONSTITUTIONAL: Denies any fever, chills, rigors, and weight loss  HEENT: No earache or tinnitus  Denies hearing loss or visual disturbances  CARDIOVASCULAR: No chest pain or palpitations  RESPIRATORY: Denies any cough, hemoptysis, shortness of breath or dyspnea on exertion  GASTROINTESTINAL: As noted in the History of Present Illness  GENITOURINARY: No problems with urination  Denies any hematuria or dysuria  NEUROLOGIC: No dizziness or vertigo, denies headaches  MUSCULOSKELETAL: Denies any muscle or joint pain  SKIN: Denies skin rashes or itching  ENDOCRINE: Denies excessive thirst  Denies intolerance to heat or cold  PSYCHOSOCIAL: Denies depression or anxiety  Denies any recent memory loss         Historical Information   Past Medical History:   Diagnosis Date    Depression     Diabetes [facility] [address] [facility]     Fibromyalgia     Hypothyroid     Pneumonia      Past Surgical History:   Procedure Laterality Date    KNEE CARTILAGE SURGERY Right     TRACHEOSTOMY       Social History   Social History     Substance and Sexual Activity   Alcohol Use Yes    Alcohol/week: 2 0 standard drinks    Types: 2 Glasses of wine per week     Social History     Substance and Sexual Activity   Drug Use No     Social History     Tobacco Use   Smoking Status Former Smoker    Packs/day: 0 25    Years: 10 00    Pack years: 2 50    Last attempt to quit: 1997    Years since quittin 1   Smokeless Tobacco Never Used     Family History   Problem Relation Age of Onset    Pneumonia Father     Asthma Brother        Meds/Allergies       Current Outpatient Medications:     atorvastatin (LIPITOR) 20 mg tablet    cholecalciferol (VITAMIN D3) 1,000 units tablet    hydroxychloroquine (PLAQUENIL) 200 mg tablet    Insulin Pen Needle 32G X 4 MM MISC    metFORMIN (GLUCOPHAGE-XR) 750 mg 24 hr tablet    predniSONE 5 mg tablet    Semaglutide,0 25 or 0 5MG/DOS, (OZEMPIC, 0 25 OR 0 5 MG/DOSE,) 2 MG/1 5ML SOPN    traMADol (ULTRAM) 50 mg tablet    ergocalciferol (VITAMIN D2) 50,000 units    Allergies   Allergen Reactions    Penicillins     Wellbutrin [Bupropion]            Objective     Blood pressure 139/68, pulse 55, temperature 97 9 °F (36 6 °C), temperature source Tympanic, height 5' (1 524 m), weight 111 kg (245 lb)  Body mass index is 47 85 kg/m²  PHYSICAL EXAM:      General Appearance:   Alert, cooperative, no distress   HEENT:   Normocephalic, atraumatic, anicteric      Neck:  Supple, symmetrical, trachea midline   Lungs:   Clear to auscultation bilaterally; no rales, rhonchi or wheezing; respirations unlabored    Heart[de-identified]   Regular rate and rhythm; no murmur, rub, or gallop  Abdomen:   Soft, non-distended; normal bowel sounds; no masses, no organomegaly  RLQ tenderness  Genitalia:   Deferred    Rectal:   Deferred    Extremities:  No cyanosis, clubbing or edema    Pulses:  2+ and symmetric    Skin:  No jaundice, rashes, or lesions    Lymph nodes:  No palpable cervical lymphadenopathy        Lab Results:     Her LFTs are elevated: AST 50, ALT normal, alkaline phosphatase 183  No visits with results within 1 Day(s) from this visit     Latest known visit with results is:   Orders Only on 02/25/2020   Component Date Value    Right Eye Diabetic Retin* 02/25/2020 None     Left Eye Diabetic Retino* 02/25/2020 None          Radiology Results:   No results found  Attestation:   By signing my name below, Keegan Sinha, attmike that this documentation has been prepared under the direction and in the presence of Kay Lennox, M D  Electronically Signed: Jorge Pepper  3/3/2020        I, Kay Lennox, personally performed the services described in this documentation  All medical record entries made by the scribe were at my direction and in my presence  I have reviewed the chart and discharge instructions and agree that the record reflects my personal performance and is accurate and complete  Kay Lennox, M D  3/3/2020

## 2020-03-06 NOTE — PATIENT INSTRUCTIONS
EGD/colonoscopy scheduled 4/30/20 with Dr Roney Garcia at [facility]  Suprep instructions given to pt during OV by Alesha Williamson

## 2020-03-12 ENCOUNTER — TRANSCRIBE ORDERS (OUTPATIENT)
Dept: GASTROENTEROLOGY | Facility: CLINIC | Age: 60
End: 2020-03-12

## 2020-03-30 ENCOUNTER — TELEPHONE (OUTPATIENT)
Dept: GASTROENTEROLOGY | Facility: CLINIC | Age: 60
End: 2020-03-30

## 2020-03-30 NOTE — TELEPHONE ENCOUNTER
LMOM informing patient that her procedure for 04/30 has been cancelled due to COVID-19 and to call back to reschedule

## 2020-04-06 ENCOUNTER — HOSPITAL ENCOUNTER (OUTPATIENT)
Dept: RADIOLOGY | Facility: HOSPITAL | Age: 60
Discharge: HOME | End: 2020-04-06
Attending: INTERNAL MEDICINE
Payer: COMMERCIAL

## 2020-04-06 DIAGNOSIS — R79.89 ELEVATED LFTS: ICD-10-CM

## 2020-04-06 PROCEDURE — 76705 ECHO EXAM OF ABDOMEN: CPT

## 2020-04-06 PROCEDURE — 76981 USE PARENCHYMA: CPT

## 2020-04-14 ENCOUNTER — TELEPHONE (OUTPATIENT)
Dept: GASTROENTEROLOGY | Facility: CLINIC | Age: 60
End: 2020-04-14

## 2020-04-27 ENCOUNTER — TELEPHONE (OUTPATIENT)
Dept: GASTROENTEROLOGY | Facility: CLINIC | Age: 60
End: 2020-04-27

## 2020-04-30 ENCOUNTER — TELEPHONE (OUTPATIENT)
Dept: GASTROENTEROLOGY | Facility: CLINIC | Age: 60
End: 2020-04-30

## 2020-04-30 ENCOUNTER — TELEMEDICINE (OUTPATIENT)
Dept: GASTROENTEROLOGY | Facility: CLINIC | Age: 60
End: 2020-04-30
Payer: COMMERCIAL

## 2020-04-30 ENCOUNTER — TELEMEDICINE (OUTPATIENT)
Dept: INTERNAL MEDICINE CLINIC | Facility: CLINIC | Age: 60
End: 2020-04-30
Payer: COMMERCIAL

## 2020-04-30 VITALS — WEIGHT: 245 LBS | BODY MASS INDEX: 46.26 KG/M2 | HEIGHT: 61 IN

## 2020-04-30 DIAGNOSIS — K75.81 NASH (NONALCOHOLIC STEATOHEPATITIS): ICD-10-CM

## 2020-04-30 DIAGNOSIS — M05.79 RHEUMATOID ARTHRITIS INVOLVING MULTIPLE SITES WITH POSITIVE RHEUMATOID FACTOR (HCC): Primary | ICD-10-CM

## 2020-04-30 DIAGNOSIS — E66.01 MORBID OBESITY (HCC): Primary | ICD-10-CM

## 2020-04-30 DIAGNOSIS — Z12.11 COLON CANCER SCREENING: ICD-10-CM

## 2020-04-30 DIAGNOSIS — K21.9 GASTROESOPHAGEAL REFLUX DISEASE WITHOUT ESOPHAGITIS: ICD-10-CM

## 2020-04-30 PROCEDURE — 99214 OFFICE O/P EST MOD 30 MIN: CPT | Performed by: INTERNAL MEDICINE

## 2020-04-30 PROCEDURE — 3008F BODY MASS INDEX DOCD: CPT | Performed by: INTERNAL MEDICINE

## 2020-04-30 PROCEDURE — 99213 OFFICE O/P EST LOW 20 MIN: CPT | Performed by: INTERNAL MEDICINE

## 2020-04-30 RX ORDER — MELOXICAM 15 MG/1
15 TABLET ORAL DAILY PRN
Qty: 90 TABLET | Refills: 1 | Status: SHIPPED | OUTPATIENT
Start: 2020-04-30 | End: 2022-01-25 | Stop reason: SDUPTHER

## 2020-05-14 ENCOUNTER — TELEMEDICINE (OUTPATIENT)
Dept: INTERNAL MEDICINE CLINIC | Facility: CLINIC | Age: 60
End: 2020-05-14
Payer: COMMERCIAL

## 2020-05-14 DIAGNOSIS — E66.01 MORBID OBESITY (HCC): ICD-10-CM

## 2020-05-14 DIAGNOSIS — K75.81 NASH (NONALCOHOLIC STEATOHEPATITIS): ICD-10-CM

## 2020-05-14 DIAGNOSIS — M05.79 RHEUMATOID ARTHRITIS INVOLVING MULTIPLE SITES WITH POSITIVE RHEUMATOID FACTOR (HCC): ICD-10-CM

## 2020-05-14 DIAGNOSIS — E78.01 FAMILIAL HYPERCHOLESTEROLEMIA: ICD-10-CM

## 2020-05-14 DIAGNOSIS — E11.9 TYPE 2 DIABETES MELLITUS WITHOUT COMPLICATION, WITHOUT LONG-TERM CURRENT USE OF INSULIN (HCC): Primary | ICD-10-CM

## 2020-05-14 PROCEDURE — 99214 OFFICE O/P EST MOD 30 MIN: CPT | Performed by: INTERNAL MEDICINE

## 2020-05-15 ENCOUNTER — APPOINTMENT (OUTPATIENT)
Dept: LAB | Facility: HOSPITAL | Age: 60
End: 2020-05-15
Payer: COMMERCIAL

## 2020-05-15 DIAGNOSIS — E11.9 TYPE 2 DIABETES MELLITUS WITHOUT COMPLICATION, WITHOUT LONG-TERM CURRENT USE OF INSULIN (HCC): ICD-10-CM

## 2020-05-15 DIAGNOSIS — E78.01 FAMILIAL HYPERCHOLESTEROLEMIA: ICD-10-CM

## 2020-05-15 DIAGNOSIS — R79.89 ELEVATED LFTS: ICD-10-CM

## 2020-05-15 LAB
ALBUMIN SERPL BCP-MCNC: 3.8 G/DL (ref 3.5–5)
ALP SERPL-CCNC: 135 U/L (ref 46–116)
ALT SERPL W P-5'-P-CCNC: 65 U/L (ref 12–78)
ANION GAP SERPL CALCULATED.3IONS-SCNC: 8 MMOL/L (ref 4–13)
AST SERPL W P-5'-P-CCNC: 42 U/L (ref 5–45)
BILIRUB SERPL-MCNC: 0.89 MG/DL (ref 0.2–1)
BUN SERPL-MCNC: 12 MG/DL (ref 5–25)
CALCIUM SERPL-MCNC: 9.4 MG/DL (ref 8.3–10.1)
CHLORIDE SERPL-SCNC: 108 MMOL/L (ref 100–108)
CHOLEST SERPL-MCNC: 157 MG/DL (ref 50–200)
CO2 SERPL-SCNC: 23 MMOL/L (ref 21–32)
CREAT SERPL-MCNC: 0.61 MG/DL (ref 0.6–1.3)
EST. AVERAGE GLUCOSE BLD GHB EST-MCNC: 143 MG/DL
FERRITIN SERPL-MCNC: 330 NG/ML (ref 8–388)
GFR SERPL CREATININE-BSD FRML MDRD: 100 ML/MIN/1.73SQ M
GLUCOSE P FAST SERPL-MCNC: 151 MG/DL (ref 65–99)
HAV AB SER QL IA: NORMAL
HBA1C MFR BLD: 6.6 %
HBV CORE IGM SER QL: NORMAL
HBV SURFACE AB SER-ACNC: <3.1 MIU/ML
HDLC SERPL-MCNC: 84 MG/DL
IGA SERPL-MCNC: 252 MG/DL (ref 70–400)
IGG SERPL-MCNC: 949 MG/DL (ref 700–1600)
IGM SERPL-MCNC: 70 MG/DL (ref 40–230)
INR PPP: 1.1 (ref 0.84–1.19)
IRON SATN MFR SERPL: 56 %
IRON SERPL-MCNC: 167 UG/DL (ref 50–170)
LDLC SERPL CALC-MCNC: 59 MG/DL (ref 0–100)
POTASSIUM SERPL-SCNC: 3.9 MMOL/L (ref 3.5–5.3)
PROT SERPL-MCNC: 7.2 G/DL (ref 6.4–8.2)
PROTHROMBIN TIME: 13.8 SECONDS (ref 11.6–14.5)
SODIUM SERPL-SCNC: 139 MMOL/L (ref 136–145)
TIBC SERPL-MCNC: 300 UG/DL (ref 250–450)
TRIGL SERPL-MCNC: 72 MG/DL

## 2020-05-15 PROCEDURE — 86256 FLUORESCENT ANTIBODY TITER: CPT

## 2020-05-15 PROCEDURE — 86708 HEPATITIS A ANTIBODY: CPT

## 2020-05-15 PROCEDURE — 86038 ANTINUCLEAR ANTIBODIES: CPT

## 2020-05-15 PROCEDURE — 80053 COMPREHEN METABOLIC PANEL: CPT

## 2020-05-15 PROCEDURE — 83540 ASSAY OF IRON: CPT

## 2020-05-15 PROCEDURE — 82103 ALPHA-1-ANTITRYPSIN TOTAL: CPT

## 2020-05-15 PROCEDURE — 3044F HG A1C LEVEL LT 7.0%: CPT | Performed by: INTERNAL MEDICINE

## 2020-05-15 PROCEDURE — 83550 IRON BINDING TEST: CPT

## 2020-05-15 PROCEDURE — 86705 HEP B CORE ANTIBODY IGM: CPT

## 2020-05-15 PROCEDURE — 36415 COLL VENOUS BLD VENIPUNCTURE: CPT

## 2020-05-15 PROCEDURE — 83036 HEMOGLOBIN GLYCOSYLATED A1C: CPT

## 2020-05-15 PROCEDURE — 82728 ASSAY OF FERRITIN: CPT

## 2020-05-15 PROCEDURE — 83516 IMMUNOASSAY NONANTIBODY: CPT

## 2020-05-15 PROCEDURE — 86706 HEP B SURFACE ANTIBODY: CPT

## 2020-05-15 PROCEDURE — 86376 MICROSOMAL ANTIBODY EACH: CPT

## 2020-05-15 PROCEDURE — 85610 PROTHROMBIN TIME: CPT

## 2020-05-15 PROCEDURE — 80061 LIPID PANEL: CPT

## 2020-05-15 PROCEDURE — 82390 ASSAY OF CERULOPLASMIN: CPT

## 2020-05-15 PROCEDURE — 87517 HEPATITIS B DNA QUANT: CPT

## 2020-05-15 PROCEDURE — 86235 NUCLEAR ANTIGEN ANTIBODY: CPT

## 2020-05-15 PROCEDURE — 82784 ASSAY IGA/IGD/IGG/IGM EACH: CPT

## 2020-05-16 LAB
A1AT SERPL-MCNC: 156 MG/DL (ref 101–187)
ACTIN IGG SERPL-ACNC: 16 UNITS (ref 0–19)
CERULOPLASMIN SERPL-MCNC: 27.5 MG/DL (ref 19–39)
LKM-1 AB SER-ACNC: <20.1 UNITS (ref 0–20)
MITOCHONDRIA M2 IGG SER-ACNC: <20 UNITS (ref 0–20)
TTG IGA SER-ACNC: <2 U/ML (ref 0–3)

## 2020-05-18 LAB — RYE IGE QN: NEGATIVE

## 2020-05-25 LAB
HBV DNA SERPL NAA+PROBE-ACNC: NORMAL IU/ML
HBV DNA SERPL NAA+PROBE-LOG IU: NORMAL LOG10 IU/ML
REF LAB TEST REF RANGE: NORMAL

## 2020-05-28 ENCOUNTER — TELEPHONE (OUTPATIENT)
Dept: GASTROENTEROLOGY | Facility: CLINIC | Age: 60
End: 2020-05-28

## 2020-06-09 ENCOUNTER — TRANSCRIBE ORDERS (OUTPATIENT)
Dept: GASTROENTEROLOGY | Facility: CLINIC | Age: 60
End: 2020-06-09

## 2020-06-22 ENCOUNTER — ANESTHESIA (OUTPATIENT)
Dept: ANESTHESIOLOGY | Facility: HOSPITAL | Age: 60
End: 2020-06-22

## 2020-06-22 ENCOUNTER — ANESTHESIA EVENT (OUTPATIENT)
Dept: ANESTHESIOLOGY | Facility: HOSPITAL | Age: 60
End: 2020-06-22

## 2020-06-22 ENCOUNTER — PREP FOR PROCEDURE (OUTPATIENT)
Dept: GASTROENTEROLOGY | Facility: CLINIC | Age: 60
End: 2020-06-22

## 2020-06-22 DIAGNOSIS — Z20.822 ENCOUNTER FOR LABORATORY TESTING FOR COVID-19 VIRUS: Primary | ICD-10-CM

## 2020-06-30 DIAGNOSIS — Z20.822 ENCOUNTER FOR LABORATORY TESTING FOR COVID-19 VIRUS: ICD-10-CM

## 2020-06-30 PROCEDURE — U0003 INFECTIOUS AGENT DETECTION BY NUCLEIC ACID (DNA OR RNA); SEVERE ACUTE RESPIRATORY SYNDROME CORONAVIRUS 2 (SARS-COV-2) (CORONAVIRUS DISEASE [COVID-19]), AMPLIFIED PROBE TECHNIQUE, MAKING USE OF HIGH THROUGHPUT TECHNOLOGIES AS DESCRIBED BY CMS-2020-01-R: HCPCS

## 2020-07-05 ENCOUNTER — ANESTHESIA EVENT (OUTPATIENT)
Dept: GASTROENTEROLOGY | Facility: AMBULARY SURGERY CENTER | Age: 60
End: 2020-07-05

## 2020-07-05 LAB — SARS-COV-2 RNA SPEC QL NAA+PROBE: NOT DETECTED

## 2020-07-05 NOTE — ANESTHESIA PREPROCEDURE EVALUATION
Review of Systems/Medical History  Patient summary reviewed  Chart reviewed  No history of anesthetic complications     Cardiovascular  Exercise tolerance (METS): >4,  Hyperlipidemia,    Pulmonary  Pneumonia,        GI/Hepatic    Liver disease , Hepatitis B,             Endo/Other    Obesity    GYN       Hematology   Musculoskeletal       Neurology   Psychology   Depression ,              Physical Exam    Airway    Mallampati score: II  TM Distance: >3 FB  Neck ROM: full     Dental   No notable dental hx     Cardiovascular  Cardiovascular exam normal    Pulmonary  Pulmonary exam normal     Other Findings        Anesthesia Plan  ASA Score- 3     Anesthesia Type- IV sedation with anesthesia with ASA Monitors  Additional Monitors:   Airway Plan:         Plan Factors-    Induction- intravenous  Postoperative Plan-     Informed Consent- Anesthetic plan and risks discussed with patient  I personally reviewed this patient with the CRNA  Discussed and agreed on the Anesthesia Plan with the CRNA  Elsa Harper

## 2020-07-06 ENCOUNTER — HOSPITAL ENCOUNTER (OUTPATIENT)
Dept: GASTROENTEROLOGY | Facility: AMBULARY SURGERY CENTER | Age: 60
Setting detail: OUTPATIENT SURGERY
Discharge: HOME | End: 2020-07-06
Attending: INTERNAL MEDICINE | Admitting: INTERNAL MEDICINE
Payer: COMMERCIAL

## 2020-07-06 ENCOUNTER — ANESTHESIA (OUTPATIENT)
Dept: GASTROENTEROLOGY | Facility: AMBULARY SURGERY CENTER | Age: 60
End: 2020-07-06

## 2020-07-06 VITALS
RESPIRATION RATE: 18 BRPM | OXYGEN SATURATION: 99 % | BODY MASS INDEX: 49.28 KG/M2 | HEART RATE: 72 BPM | SYSTOLIC BLOOD PRESSURE: 149 MMHG | WEIGHT: 251 LBS | TEMPERATURE: 97.4 F | HEIGHT: 60 IN | DIASTOLIC BLOOD PRESSURE: 75 MMHG

## 2020-07-06 DIAGNOSIS — K21.9 GASTROESOPHAGEAL REFLUX DISEASE WITHOUT ESOPHAGITIS: ICD-10-CM

## 2020-07-06 DIAGNOSIS — Z12.11 COLON CANCER SCREENING: ICD-10-CM

## 2020-07-06 LAB — GLUCOSE SERPL-MCNC: 126 MG/DL (ref 65–140)

## 2020-07-06 PROCEDURE — 43239 EGD BIOPSY SINGLE/MULTIPLE: CPT | Performed by: INTERNAL MEDICINE

## 2020-07-06 PROCEDURE — 88305 TISSUE EXAM BY PATHOLOGIST: CPT | Performed by: PATHOLOGY

## 2020-07-06 PROCEDURE — 82948 REAGENT STRIP/BLOOD GLUCOSE: CPT

## 2020-07-06 RX ORDER — LIDOCAINE HYDROCHLORIDE 10 MG/ML
INJECTION, SOLUTION EPIDURAL; INFILTRATION; INTRACAUDAL; PERINEURAL AS NEEDED
Status: DISCONTINUED | OUTPATIENT
Start: 2020-07-06 | End: 2020-07-06 | Stop reason: SURG

## 2020-07-06 RX ORDER — PROPOFOL 10 MG/ML
INJECTION, EMULSION INTRAVENOUS AS NEEDED
Status: DISCONTINUED | OUTPATIENT
Start: 2020-07-06 | End: 2020-07-06 | Stop reason: SURG

## 2020-07-06 RX ORDER — PROPOFOL 10 MG/ML
INJECTION, EMULSION INTRAVENOUS CONTINUOUS PRN
Status: DISCONTINUED | OUTPATIENT
Start: 2020-07-06 | End: 2020-07-06 | Stop reason: SURG

## 2020-07-06 RX ORDER — SODIUM CHLORIDE, SODIUM LACTATE, POTASSIUM CHLORIDE, CALCIUM CHLORIDE 600; 310; 30; 20 MG/100ML; MG/100ML; MG/100ML; MG/100ML
125 INJECTION, SOLUTION INTRAVENOUS CONTINUOUS
Status: DISCONTINUED | OUTPATIENT
Start: 2020-07-06 | End: 2020-07-10 | Stop reason: HOSPADM

## 2020-07-06 RX ADMIN — PROPOFOL 120 MCG/KG/MIN: 10 INJECTION, EMULSION INTRAVENOUS at 08:36

## 2020-07-06 RX ADMIN — PROPOFOL 130 MG: 10 INJECTION, EMULSION INTRAVENOUS at 08:36

## 2020-07-06 RX ADMIN — SODIUM CHLORIDE, SODIUM LACTATE, POTASSIUM CHLORIDE, AND CALCIUM CHLORIDE 125 ML/HR: .6; .31; .03; .02 INJECTION, SOLUTION INTRAVENOUS at 08:13

## 2020-07-06 RX ADMIN — LIDOCAINE HYDROCHLORIDE 120 MG: 10 INJECTION, SOLUTION EPIDURAL; INFILTRATION; INTRACAUDAL; PERINEURAL at 08:36

## 2020-07-06 NOTE — ANESTHESIA POSTPROCEDURE EVALUATION
Post-Op Assessment Note    CV Status:  Stable    Pain management: adequate     Mental Status:  Alert and awake   Hydration Status:  Euvolemic   PONV Controlled:  Controlled   Airway Patency:  Patent   Post Op Vitals Reviewed: Yes      Staff: CRNA           /70 (07/06/20 0846)    Temp (!) 97 3 °F (36 3 °C) (07/06/20 0846)    Pulse 78 (07/06/20 0846)   Resp 16 (07/06/20 0846)    SpO2 97 % (07/06/20 0846)

## 2020-07-06 NOTE — H&P
History and Physical - [facility]  Ly Allen 62 y o  female MRN: [id number]    HPI: Ly Allen is a 62y o  year old female who presents for EGD and colonoscopy for evaluation of GERD and colon cancer screening  Review of Systems    Historical Information   Past Medical History:   Diagnosis Date    Depression     [facility]     [facility]     Fibromyalgia     GERD (gastroesophageal reflux disease)     Hypothyroid     Pneumonia     Shortness of breath      Past Surgical History:   Procedure Laterality Date    KNEE CARTILAGE SURGERY Right     TRACHEOSTOMY       Social History   Social History     Substance and Sexual Activity   Alcohol Use Yes    Alcohol/week: 2 0 standard drinks    Types: 2 Glasses of wine per week    Frequency: Monthly or less    Drinks per session: 1 or 2    Binge frequency: Never     Social History     Substance and Sexual Activity   Drug Use No     Social History     Tobacco Use   Smoking Status Former Smoker    Packs/day: 0 25    Years: 10 00    Pack years: 2 50    Last attempt to quit: 1997    Years since quittin 4   Smokeless Tobacco Never Used     Family History   Problem Relation Age of Onset    Pneumonia Father     Asthma Brother        Meds/Allergies       (Not in a hospital admission)    Allergies   Allergen Reactions    Penicillins     Wellbutrin [Bupropion]        Objective     Pulse 73   Temp (!) 97 1 °F (36 2 °C) (Temporal)   Resp 18   Ht 5' (1 524 m)   Wt 114 kg (251 lb)   SpO2 98%   Breastfeeding No   BMI 49 02 kg/m²       PHYSICAL EXAM    Gen: NAD  CV: RRR  CHEST: Clear  ABD: soft, NT/ND  EXT: no edema  Neuro: AAO      ASSESSMENT/PLAN:  This is a 62y o  year old female here for EGD and colonoscopy for evaluation of GERD and colon cancer screening      PLAN:   Procedure:  EGD and colonoscopy biopsy and possible polypectomy

## 2020-07-07 ENCOUNTER — TELEPHONE (OUTPATIENT)
Dept: GASTROENTEROLOGY | Facility: CLINIC | Age: 60
End: 2020-07-07

## 2020-07-07 NOTE — TELEPHONE ENCOUNTER
----- Message from Shelia Meng MD sent at 7/6/2020  9:01 AM EDT -----  Please disregard my previous message  Apparently the patient ate solid food yesterday  No need to do gastric emptying study  Reschedule her for EGD and colonoscopy and agree for suprep for her bowel prep

## 2020-07-21 ENCOUNTER — TELEPHONE (OUTPATIENT)
Dept: GASTROENTEROLOGY | Facility: CLINIC | Age: 60
End: 2020-07-21

## 2020-07-21 NOTE — TELEPHONE ENCOUNTER
----- Message from Thierry Erwin MD sent at 7/17/2020 12:02 AM EDT -----  Please treat her H pylori infection    Thank you

## 2020-07-22 NOTE — TELEPHONE ENCOUNTER
Patients GI provider:  Dr Sabi Harry    Number to return call: [phone]    Reason for call: Pt calling stated she is returning a call from [facility] for results    Scheduled procedure/appointment date if applicable: Apt/procedure n/a

## 2020-07-23 ENCOUNTER — TELEPHONE (OUTPATIENT)
Dept: GASTROENTEROLOGY | Facility: AMBULARY SURGERY CENTER | Age: 60
End: 2020-07-23

## 2020-07-23 DIAGNOSIS — A04.8 H. PYLORI INFECTION: Primary | ICD-10-CM

## 2020-07-23 RX ORDER — METRONIDAZOLE 500 MG/1
500 TABLET ORAL EVERY 12 HOURS SCHEDULED
Qty: 28 TABLET | Refills: 0 | Status: SHIPPED | OUTPATIENT
Start: 2020-07-23 | End: 2020-08-06

## 2020-07-23 RX ORDER — PANTOPRAZOLE SODIUM 40 MG/1
40 TABLET, DELAYED RELEASE ORAL 2 TIMES DAILY
Qty: 28 TABLET | Refills: 3 | Status: SHIPPED | OUTPATIENT
Start: 2020-07-23

## 2020-07-23 RX ORDER — CLARITHROMYCIN 500 MG/1
500 TABLET, COATED ORAL EVERY 12 HOURS SCHEDULED
Qty: 28 TABLET | Refills: 0 | Status: SHIPPED | OUTPATIENT
Start: 2020-07-23 | End: 2020-08-06

## 2020-07-23 NOTE — TELEPHONE ENCOUNTER
Patient with recent diagnosis of H pylori  Meds have not been called to her pharmacy yet  Please call H pylori medications to [facility]  Thank you

## 2020-07-23 NOTE — TELEPHONE ENCOUNTER
Patients GI provider:  Dr Lyle Alvares    Number to return call: [phone]    Reason for call: Pt calling stated pharmacy does not have the medication for  the antibiotic  hypylori     Scheduled procedure/appointment date if applicable: Apt/procedure n/a

## 2020-07-31 ENCOUNTER — TELEPHONE (OUTPATIENT)
Dept: GASTROENTEROLOGY | Facility: CLINIC | Age: 60
End: 2020-07-31

## 2020-08-26 DIAGNOSIS — E55.9 VITAMIN D DEFICIENCY: ICD-10-CM

## 2020-08-26 RX ORDER — MELATONIN
2000 DAILY
Qty: 180 TABLET | Refills: 0 | Status: SHIPPED | OUTPATIENT
Start: 2020-08-26 | End: 2020-12-05

## 2020-09-08 DIAGNOSIS — E78.01 FAMILIAL HYPERCHOLESTEROLEMIA: ICD-10-CM

## 2020-09-08 RX ORDER — ATORVASTATIN CALCIUM 20 MG/1
20 TABLET, FILM COATED ORAL DAILY
Qty: 90 TABLET | Refills: 0 | Status: SHIPPED | OUTPATIENT
Start: 2020-09-08 | End: 2021-01-11

## 2020-09-22 ENCOUNTER — APPOINTMENT (OUTPATIENT)
Dept: LAB | Facility: HOSPITAL | Age: 60
End: 2020-09-22
Payer: COMMERCIAL

## 2020-09-22 DIAGNOSIS — M05.9 RHEUMATOID ARTHRITIS WITH POSITIVE RHEUMATOID FACTOR, INVOLVING UNSPECIFIED SITE (HCC): Primary | ICD-10-CM

## 2020-09-22 LAB
ALBUMIN SERPL BCP-MCNC: 3.9 G/DL (ref 3.5–5)
ALP SERPL-CCNC: 133 U/L (ref 46–116)
ALT SERPL W P-5'-P-CCNC: 65 U/L (ref 12–78)
AST SERPL W P-5'-P-CCNC: 36 U/L (ref 5–45)
BASOPHILS # BLD AUTO: 0.05 THOUSANDS/ΜL (ref 0–0.1)
BASOPHILS NFR BLD AUTO: 1 % (ref 0–1)
BILIRUB DIRECT SERPL-MCNC: 0.32 MG/DL (ref 0–0.2)
BILIRUB SERPL-MCNC: 0.96 MG/DL (ref 0.2–1)
CREAT SERPL-MCNC: 0.56 MG/DL (ref 0.6–1.3)
CRP SERPL QL: <3 MG/L
EOSINOPHIL # BLD AUTO: 0.13 THOUSAND/ΜL (ref 0–0.61)
EOSINOPHIL NFR BLD AUTO: 2 % (ref 0–6)
ERYTHROCYTE [DISTWIDTH] IN BLOOD BY AUTOMATED COUNT: 12.4 % (ref 11.6–15.1)
ERYTHROCYTE [SEDIMENTATION RATE] IN BLOOD: 22 MM/HOUR (ref 0–29)
GFR SERPL CREATININE-BSD FRML MDRD: 102 ML/MIN/1.73SQ M
HCT VFR BLD AUTO: 42.4 % (ref 34.8–46.1)
HGB BLD-MCNC: 13.8 G/DL (ref 11.5–15.4)
IMM GRANULOCYTES # BLD AUTO: 0.01 THOUSAND/UL (ref 0–0.2)
IMM GRANULOCYTES NFR BLD AUTO: 0 % (ref 0–2)
LYMPHOCYTES # BLD AUTO: 1.94 THOUSANDS/ΜL (ref 0.6–4.47)
LYMPHOCYTES NFR BLD AUTO: 26 % (ref 14–44)
MCH RBC QN AUTO: 30.7 PG (ref 26.8–34.3)
MCHC RBC AUTO-ENTMCNC: 32.5 G/DL (ref 31.4–37.4)
MCV RBC AUTO: 94 FL (ref 82–98)
MONOCYTES # BLD AUTO: 0.43 THOUSAND/ΜL (ref 0.17–1.22)
MONOCYTES NFR BLD AUTO: 6 % (ref 4–12)
NEUTROPHILS # BLD AUTO: 4.8 THOUSANDS/ΜL (ref 1.85–7.62)
NEUTS SEG NFR BLD AUTO: 65 % (ref 43–75)
NRBC BLD AUTO-RTO: 0 /100 WBCS
PLATELET # BLD AUTO: 101 THOUSANDS/UL (ref 149–390)
PMV BLD AUTO: 13.9 FL (ref 8.9–12.7)
PROT SERPL-MCNC: 7.4 G/DL (ref 6.4–8.2)
RBC # BLD AUTO: 4.49 MILLION/UL (ref 3.81–5.12)
WBC # BLD AUTO: 7.36 THOUSAND/UL (ref 4.31–10.16)

## 2020-09-22 PROCEDURE — 86140 C-REACTIVE PROTEIN: CPT

## 2020-09-22 PROCEDURE — 80076 HEPATIC FUNCTION PANEL: CPT

## 2020-09-22 PROCEDURE — 82565 ASSAY OF CREATININE: CPT

## 2020-09-22 PROCEDURE — 85025 COMPLETE CBC W/AUTO DIFF WBC: CPT

## 2020-09-22 PROCEDURE — 36415 COLL VENOUS BLD VENIPUNCTURE: CPT

## 2020-09-22 PROCEDURE — 85652 RBC SED RATE AUTOMATED: CPT

## 2020-11-12 ENCOUNTER — TELEMEDICINE (OUTPATIENT)
Dept: INTERNAL MEDICINE CLINIC | Facility: CLINIC | Age: 60
End: 2020-11-12
Payer: COMMERCIAL

## 2020-11-12 DIAGNOSIS — F33.41 RECURRENT MAJOR DEPRESSION IN PARTIAL REMISSION (HCC): ICD-10-CM

## 2020-11-12 DIAGNOSIS — K75.81 NASH (NONALCOHOLIC STEATOHEPATITIS): ICD-10-CM

## 2020-11-12 DIAGNOSIS — E11.9 TYPE 2 DIABETES MELLITUS WITHOUT COMPLICATION, WITHOUT LONG-TERM CURRENT USE OF INSULIN (HCC): ICD-10-CM

## 2020-11-12 DIAGNOSIS — M05.79 RHEUMATOID ARTHRITIS INVOLVING MULTIPLE SITES WITH POSITIVE RHEUMATOID FACTOR (HCC): ICD-10-CM

## 2020-11-12 DIAGNOSIS — E66.01 MORBID OBESITY (HCC): ICD-10-CM

## 2020-11-12 DIAGNOSIS — M79.7 FIBROMYALGIA: ICD-10-CM

## 2020-11-12 DIAGNOSIS — K21.9 GASTROESOPHAGEAL REFLUX DISEASE WITHOUT ESOPHAGITIS: ICD-10-CM

## 2020-11-12 DIAGNOSIS — E78.01 FAMILIAL HYPERCHOLESTEROLEMIA: Primary | ICD-10-CM

## 2020-11-12 DIAGNOSIS — Z12.31 ENCOUNTER FOR SCREENING MAMMOGRAM FOR MALIGNANT NEOPLASM OF BREAST: ICD-10-CM

## 2020-11-12 PROCEDURE — 99214 OFFICE O/P EST MOD 30 MIN: CPT | Performed by: INTERNAL MEDICINE

## 2020-11-12 RX ORDER — METHOTREXATE 2.5 MG/1
TABLET ORAL
COMMUNITY
Start: 2020-10-26

## 2020-11-12 RX ORDER — FOLIC ACID 1 MG/1
1000 TABLET ORAL DAILY
COMMUNITY
Start: 2020-09-26

## 2020-12-05 DIAGNOSIS — E55.9 VITAMIN D DEFICIENCY: ICD-10-CM

## 2020-12-05 RX ORDER — MULTIVIT-MIN/IRON/FOLIC ACID/K 18-600-40
CAPSULE ORAL
Qty: 180 TABLET | Refills: 0 | Status: SHIPPED | OUTPATIENT
Start: 2020-12-05 | End: 2021-03-09

## 2020-12-29 ENCOUNTER — LAB (OUTPATIENT)
Dept: LAB | Facility: MEDICAL CENTER | Age: 60
End: 2020-12-29
Payer: COMMERCIAL

## 2020-12-29 ENCOUNTER — TRANSCRIBE ORDERS (OUTPATIENT)
Dept: ADMINISTRATIVE | Facility: HOSPITAL | Age: 60
End: 2020-12-29

## 2020-12-29 DIAGNOSIS — M05.9 RHEUMATOID ARTHRITIS WITH POSITIVE RHEUMATOID FACTOR, INVOLVING UNSPECIFIED SITE (HCC): Primary | ICD-10-CM

## 2020-12-29 DIAGNOSIS — M05.9 RHEUMATOID ARTHRITIS WITH POSITIVE RHEUMATOID FACTOR, INVOLVING UNSPECIFIED SITE (HCC): ICD-10-CM

## 2020-12-29 LAB
ALBUMIN SERPL BCP-MCNC: 3.9 G/DL (ref 3.5–5)
ALP SERPL-CCNC: 164 U/L (ref 46–116)
ALT SERPL W P-5'-P-CCNC: 62 U/L (ref 12–78)
AST SERPL W P-5'-P-CCNC: 37 U/L (ref 5–45)
BASOPHILS # BLD AUTO: 0.09 THOUSANDS/ΜL (ref 0–0.1)
BASOPHILS NFR BLD AUTO: 1 % (ref 0–1)
BILIRUB DIRECT SERPL-MCNC: 0.35 MG/DL (ref 0–0.2)
BILIRUB SERPL-MCNC: 1.01 MG/DL (ref 0.2–1)
CREAT SERPL-MCNC: 0.64 MG/DL (ref 0.6–1.3)
CRP SERPL QL: <3 MG/L
EOSINOPHIL # BLD AUTO: 0.16 THOUSAND/ΜL (ref 0–0.61)
EOSINOPHIL NFR BLD AUTO: 2 % (ref 0–6)
ERYTHROCYTE [DISTWIDTH] IN BLOOD BY AUTOMATED COUNT: 12.2 % (ref 11.6–15.1)
ERYTHROCYTE [SEDIMENTATION RATE] IN BLOOD: 24 MM/HOUR (ref 0–29)
GFR SERPL CREATININE-BSD FRML MDRD: 98 ML/MIN/1.73SQ M
HCT VFR BLD AUTO: 44 % (ref 34.8–46.1)
HGB BLD-MCNC: 14.5 G/DL (ref 11.5–15.4)
IMM GRANULOCYTES # BLD AUTO: 0.03 THOUSAND/UL (ref 0–0.2)
IMM GRANULOCYTES NFR BLD AUTO: 0 % (ref 0–2)
LYMPHOCYTES # BLD AUTO: 2.35 THOUSANDS/ΜL (ref 0.6–4.47)
LYMPHOCYTES NFR BLD AUTO: 26 % (ref 14–44)
MCH RBC QN AUTO: 30.5 PG (ref 26.8–34.3)
MCHC RBC AUTO-ENTMCNC: 33 G/DL (ref 31.4–37.4)
MCV RBC AUTO: 93 FL (ref 82–98)
MONOCYTES # BLD AUTO: 0.65 THOUSAND/ΜL (ref 0.17–1.22)
MONOCYTES NFR BLD AUTO: 7 % (ref 4–12)
NEUTROPHILS # BLD AUTO: 5.87 THOUSANDS/ΜL (ref 1.85–7.62)
NEUTS SEG NFR BLD AUTO: 64 % (ref 43–75)
NRBC BLD AUTO-RTO: 0 /100 WBCS
PLATELET # BLD AUTO: 115 THOUSANDS/UL (ref 149–390)
PMV BLD AUTO: 13.8 FL (ref 8.9–12.7)
PROT SERPL-MCNC: 7.3 G/DL (ref 6.4–8.2)
RBC # BLD AUTO: 4.75 MILLION/UL (ref 3.81–5.12)
WBC # BLD AUTO: 9.15 THOUSAND/UL (ref 4.31–10.16)

## 2020-12-29 PROCEDURE — 85652 RBC SED RATE AUTOMATED: CPT

## 2020-12-29 PROCEDURE — 82565 ASSAY OF CREATININE: CPT

## 2020-12-29 PROCEDURE — 36415 COLL VENOUS BLD VENIPUNCTURE: CPT

## 2020-12-29 PROCEDURE — 86140 C-REACTIVE PROTEIN: CPT

## 2020-12-29 PROCEDURE — 85025 COMPLETE CBC W/AUTO DIFF WBC: CPT

## 2020-12-29 PROCEDURE — 80076 HEPATIC FUNCTION PANEL: CPT

## 2021-01-11 DIAGNOSIS — E78.01 FAMILIAL HYPERCHOLESTEROLEMIA: ICD-10-CM

## 2021-01-11 RX ORDER — ATORVASTATIN CALCIUM 20 MG/1
TABLET, FILM COATED ORAL
Qty: 90 TABLET | Refills: 0 | Status: SHIPPED | OUTPATIENT
Start: 2021-01-11 | End: 2021-04-30

## 2021-01-15 DIAGNOSIS — E11.9 TYPE 2 DIABETES MELLITUS WITHOUT COMPLICATION, WITHOUT LONG-TERM CURRENT USE OF INSULIN (HCC): ICD-10-CM

## 2021-01-16 RX ORDER — METFORMIN HYDROCHLORIDE 750 MG/1
TABLET, EXTENDED RELEASE ORAL
Qty: 90 TABLET | Refills: 0 | Status: SHIPPED | OUTPATIENT
Start: 2021-01-16 | End: 2021-04-20

## 2021-03-09 ENCOUNTER — TELEPHONE (OUTPATIENT)
Dept: INTERNAL MEDICINE CLINIC | Facility: CLINIC | Age: 61
End: 2021-03-09

## 2021-03-09 ENCOUNTER — TRANSCRIBE ORDERS (OUTPATIENT)
Dept: ADMINISTRATIVE | Facility: HOSPITAL | Age: 61
End: 2021-03-09

## 2021-03-09 ENCOUNTER — APPOINTMENT (OUTPATIENT)
Dept: LAB | Facility: MEDICAL CENTER | Age: 61
End: 2021-03-09
Payer: COMMERCIAL

## 2021-03-09 DIAGNOSIS — E55.9 VITAMIN D DEFICIENCY: ICD-10-CM

## 2021-03-09 DIAGNOSIS — M05.9 SEROPOSITIVE RHEUMATOID ARTHRITIS (HCC): ICD-10-CM

## 2021-03-09 DIAGNOSIS — M05.9 SEROPOSITIVE RHEUMATOID ARTHRITIS (HCC): Primary | ICD-10-CM

## 2021-03-09 LAB
ALBUMIN SERPL BCP-MCNC: 3.7 G/DL (ref 3.5–5)
ALP SERPL-CCNC: 153 U/L (ref 46–116)
ALT SERPL W P-5'-P-CCNC: 59 U/L (ref 12–78)
AST SERPL W P-5'-P-CCNC: 31 U/L (ref 5–45)
BASOPHILS # BLD AUTO: 0.05 THOUSANDS/ΜL (ref 0–0.1)
BASOPHILS NFR BLD AUTO: 1 % (ref 0–1)
BILIRUB DIRECT SERPL-MCNC: 0.28 MG/DL (ref 0–0.2)
BILIRUB SERPL-MCNC: 0.91 MG/DL (ref 0.2–1)
CHOLEST SERPL-MCNC: 136 MG/DL (ref 50–200)
CREAT SERPL-MCNC: 0.68 MG/DL (ref 0.6–1.3)
CRP SERPL QL: <3 MG/L
EOSINOPHIL # BLD AUTO: 0.15 THOUSAND/ΜL (ref 0–0.61)
EOSINOPHIL NFR BLD AUTO: 2 % (ref 0–6)
ERYTHROCYTE [DISTWIDTH] IN BLOOD BY AUTOMATED COUNT: 12.4 % (ref 11.6–15.1)
ERYTHROCYTE [SEDIMENTATION RATE] IN BLOOD: 23 MM/HOUR (ref 0–29)
EST. AVERAGE GLUCOSE BLD GHB EST-MCNC: 160 MG/DL
GFR SERPL CREATININE-BSD FRML MDRD: 96 ML/MIN/1.73SQ M
HBA1C MFR BLD: 7.2 %
HCT VFR BLD AUTO: 40.6 % (ref 34.8–46.1)
HDLC SERPL-MCNC: 92 MG/DL
HGB BLD-MCNC: 13.5 G/DL (ref 11.5–15.4)
IMM GRANULOCYTES # BLD AUTO: 0.02 THOUSAND/UL (ref 0–0.2)
IMM GRANULOCYTES NFR BLD AUTO: 0 % (ref 0–2)
LDLC SERPL CALC-MCNC: 35 MG/DL (ref 0–100)
LYMPHOCYTES # BLD AUTO: 1.72 THOUSANDS/ΜL (ref 0.6–4.47)
LYMPHOCYTES NFR BLD AUTO: 25 % (ref 14–44)
MCH RBC QN AUTO: 30.8 PG (ref 26.8–34.3)
MCHC RBC AUTO-ENTMCNC: 33.3 G/DL (ref 31.4–37.4)
MCV RBC AUTO: 93 FL (ref 82–98)
MONOCYTES # BLD AUTO: 0.49 THOUSAND/ΜL (ref 0.17–1.22)
MONOCYTES NFR BLD AUTO: 7 % (ref 4–12)
NEUTROPHILS # BLD AUTO: 4.55 THOUSANDS/ΜL (ref 1.85–7.62)
NEUTS SEG NFR BLD AUTO: 65 % (ref 43–75)
NRBC BLD AUTO-RTO: 0 /100 WBCS
PLATELET # BLD AUTO: 98 THOUSANDS/UL (ref 149–390)
PMV BLD AUTO: 13.8 FL (ref 8.9–12.7)
PROT SERPL-MCNC: 6.9 G/DL (ref 6.4–8.2)
RBC # BLD AUTO: 4.39 MILLION/UL (ref 3.81–5.12)
TRIGL SERPL-MCNC: 46 MG/DL
WBC # BLD AUTO: 6.98 THOUSAND/UL (ref 4.31–10.16)

## 2021-03-09 PROCEDURE — 80076 HEPATIC FUNCTION PANEL: CPT

## 2021-03-09 PROCEDURE — 86140 C-REACTIVE PROTEIN: CPT

## 2021-03-09 PROCEDURE — 3051F HG A1C>EQUAL 7.0%<8.0%: CPT | Performed by: INTERNAL MEDICINE

## 2021-03-09 PROCEDURE — 82565 ASSAY OF CREATININE: CPT

## 2021-03-09 PROCEDURE — 85652 RBC SED RATE AUTOMATED: CPT

## 2021-03-09 PROCEDURE — 85025 COMPLETE CBC W/AUTO DIFF WBC: CPT

## 2021-03-09 PROCEDURE — 80061 LIPID PANEL: CPT

## 2021-03-09 PROCEDURE — 83036 HEMOGLOBIN GLYCOSYLATED A1C: CPT | Performed by: INTERNAL MEDICINE

## 2021-03-09 PROCEDURE — 36415 COLL VENOUS BLD VENIPUNCTURE: CPT | Performed by: INTERNAL MEDICINE

## 2021-03-09 RX ORDER — MULTIVIT-MIN/IRON/FOLIC ACID/K 18-600-40
CAPSULE ORAL
Qty: 180 TABLET | Refills: 0 | Status: SHIPPED | OUTPATIENT
Start: 2021-03-09 | End: 2021-06-15

## 2021-03-09 NOTE — TELEPHONE ENCOUNTER
----- Message from Daryl Garzon DO sent at 3/9/2021  4:27 PM EST -----  Please let patient know her cholesterol was good, however her a1c is now up to 7 2  she needs to cut back on carbohydrates  Please have her and  come into the office for their physicals in 1-2 months

## 2021-03-29 ENCOUNTER — TELEMEDICINE (OUTPATIENT)
Dept: INTERNAL MEDICINE CLINIC | Facility: CLINIC | Age: 61
End: 2021-03-29
Payer: COMMERCIAL

## 2021-03-29 DIAGNOSIS — Z03.818 ENCOUNTER FOR OBSERVATION FOR SUSPECTED EXPOSURE TO OTHER BIOLOGICAL AGENTS RULED OUT: ICD-10-CM

## 2021-03-29 DIAGNOSIS — Z03.818 ENCOUNTER FOR OBSERVATION FOR SUSPECTED EXPOSURE TO OTHER BIOLOGICAL AGENTS RULED OUT: Primary | ICD-10-CM

## 2021-03-29 PROCEDURE — 1036F TOBACCO NON-USER: CPT | Performed by: INTERNAL MEDICINE

## 2021-03-29 PROCEDURE — 99213 OFFICE O/P EST LOW 20 MIN: CPT | Performed by: INTERNAL MEDICINE

## 2021-03-29 PROCEDURE — U0005 INFEC AGEN DETEC AMPLI PROBE: HCPCS | Performed by: INTERNAL MEDICINE

## 2021-03-29 PROCEDURE — U0003 INFECTIOUS AGENT DETECTION BY NUCLEIC ACID (DNA OR RNA); SEVERE ACUTE RESPIRATORY SYNDROME CORONAVIRUS 2 (SARS-COV-2) (CORONAVIRUS DISEASE [COVID-19]), AMPLIFIED PROBE TECHNIQUE, MAKING USE OF HIGH THROUGHPUT TECHNOLOGIES AS DESCRIBED BY CMS-2020-01-R: HCPCS | Performed by: INTERNAL MEDICINE

## 2021-03-29 NOTE — PROGRESS NOTES
COVID-19 Virtual Visit     Assessment/Plan:    Problem List Items Addressed This Visit     None      Visit Diagnoses     Encounter for observation for suspected exposure to other biological agents ruled out    -  Primary          Presents for an acute visit  Reports that her  was potentially exposed to a COVID positive co-worker  She reports that her  has received the 1st dose of the COVID vaccine  She denies any symptoms  Her  also denies any symptoms  They have been remaining quarantine at home since  03/25/2021  We will obtain a COVID swab at this time  Disposition:     I referred patient to one of our centralized sites for a COVID-19 swab  I have spent 5 minutes directly with the patient  Greater than 50% of this time was spent in counseling/coordination of care regarding: diagnostic results, prognosis, instructions for management, patient and family education, importance of treatment compliance and risk factor reductions  Encounter provider Emma Guardado DO    Provider located at [address] [place] [address]  [address]ia [address]  [phone]    Recent Visits  No visits were found meeting these conditions  Showing recent visits within past 7 days and meeting all other requirements     Today's Visits  Date Type Provider Dept   03/29/21 Jyoti   96 , 054 [facility] Internal Med   Showing today's visits and meeting all other requirements     Future Appointments  No visits were found meeting these conditions  Showing future appointments within next 150 days and meeting all other requirements        Patient agrees to participate in a virtual check in via telephone or video visit instead of presenting to the office to address urgent/immediate medical needs  Patient is aware this is a billable service      After connecting through Telephone, the patient was identified by name and date of birth  Preet Humphreys was informed that this was a telemedicine visit and that the exam was being conducted confidentially over secure lines  My office door was closed  No one else was in the room  Preet Humphreys acknowledged consent and understanding of privacy and security of the telemedicine visit  I informed the patient that I have reviewed her record in Epic and presented the opportunity for her to ask any questions regarding the visit today  The patient agreed to participate  It was my intent to perform this visit via video technology but the patient was not able to do a video connection so the visit was completed via audio telephone only  Subjective: Preet Humphreys is a 62 y o  female who is concerned about COVID-19  Patient is currently asymptomatic  Patient denies fever, chills, fatigue, malaise, congestion, rhinorrhea, sore throat, anosmia, loss of taste, cough, shortness of breath, chest tightness, abdominal pain, nausea, vomiting, diarrhea, myalgias and headaches       Date of exposure: 3/25/2021    Exposure:   Contact with a person who is under investigation (PUI) for or who is positive for COVID-19 within the last 14 days?: Yes    Hospitalized recently for fever and/or lower respiratory symptoms?: No      Currently a healthcare worker that is involved in direct patient care?: No      Works in a special setting where the risk of COVID-19 transmission may be high? (this may include long-term care, correctional and [facility] facilities; homeless shelters; assisted-living facilities and group homes ): No      Resident in a special setting where the risk of COVID-19 transmission may be high? (this may include long-term care, correctional and [facility] facilities; homeless shelters; assisted-living facilities and group homes ): No      Lab Results   Component Value Date    Clarke Puff Not Detected 06/30/2020     Past Medical History:   Diagnosis Date    Depression     [facility] [facility]     [facility] [facility]     Fibromyalgia     GERD (gastroesophageal reflux disease)     Hypothyroid     Pneumonia     Shortness of breath      Past Surgical History:   Procedure Laterality Date    KNEE CARTILAGE SURGERY Right     TRACHEOSTOMY       Current Outpatient Medications   Medication Sig Dispense Refill    atorvastatin (LIPITOR) 20 mg tablet Take 1 tablet by mouth once daily 90 tablet 0    ergocalciferol (VITAMIN D2) 50,000 units Take 1 capsule (50,000 Units total) by mouth every 28 days for 3 days 3 capsule 0    folic acid (FOLVITE) 1 mg tablet Take 1,000 mcg by mouth daily      hydroxychloroquine (PLAQUENIL) 200 mg tablet Take 200 mg by mouth 2 (two) times a day with meals      meloxicam (MOBIC) 15 mg tablet Take 1 tablet (15 mg total) by mouth daily as needed for mild pain 90 tablet 1    metFORMIN (GLUCOPHAGE-XR) 750 mg 24 hr tablet TAKE 1 TABLET BY MOUTH WITH BREAKFAST 90 tablet 0    methotrexate 2 5 MG tablet TAKE 4 TABLETS BY MOUTH EVERY FRIDAY      pantoprazole (PROTONIX) 40 mg tablet Take 1 tablet (40 mg total) by mouth 2 (two) times a day 28 tablet 3    predniSONE 5 mg tablet       traMADol (ULTRAM) 50 mg tablet Take 1 tablet (50 mg total) by mouth every 8 (eight) hours as needed for severe pain 60 tablet 0    Vitamin D, Cholecalciferol, 25 MCG (1000 UT) TABS Take 2 tablets by mouth once daily 180 tablet 0     No current facility-administered medications for this visit  Allergies   Allergen Reactions    Penicillins     Wellbutrin [Bupropion]        Review of Systems   Constitutional: Negative for chills, fatigue and fever  HENT: Negative for congestion, rhinorrhea and sore throat  Respiratory: Negative for cough, chest tightness and shortness of breath  Gastrointestinal: Negative for abdominal pain, diarrhea, nausea and vomiting  Musculoskeletal: Negative for myalgias  Neurological: Negative for headaches  Objective:     There were no vitals filed for this visit     Physical Exam  VIRTUAL VISIT DISCLAIMER    Owen Whitney acknowledges that she has consented to an online visit or consultation  She understands that the online visit is based solely on information provided by her, and that, in the absence of a face-to-face physical evaluation by the physician, the diagnosis she receives is both limited and provisional in terms of accuracy and completeness  This is not intended to replace a full medical face-to-face evaluation by the physician  Declanbruno Whitney understands and accepts these terms

## 2021-03-30 ENCOUNTER — TELEMEDICINE (OUTPATIENT)
Dept: INTERNAL MEDICINE CLINIC | Facility: CLINIC | Age: 61
End: 2021-03-30
Payer: COMMERCIAL

## 2021-03-30 ENCOUNTER — RA CDI HCC (OUTPATIENT)
Dept: OTHER | Facility: HOSPITAL | Age: 61
End: 2021-03-30

## 2021-03-30 DIAGNOSIS — M25.562 ACUTE PAIN OF BOTH KNEES: ICD-10-CM

## 2021-03-30 DIAGNOSIS — M25.561 ACUTE PAIN OF BOTH KNEES: ICD-10-CM

## 2021-03-30 DIAGNOSIS — Z03.818 ENCOUNTER FOR OBSERVATION FOR SUSPECTED EXPOSURE TO OTHER BIOLOGICAL AGENTS RULED OUT: Primary | ICD-10-CM

## 2021-03-30 LAB — SARS-COV-2 RNA RESP QL NAA+PROBE: NEGATIVE

## 2021-03-30 PROCEDURE — 99214 OFFICE O/P EST MOD 30 MIN: CPT | Performed by: INTERNAL MEDICINE

## 2021-03-30 RX ORDER — OXYCODONE HYDROCHLORIDE AND ACETAMINOPHEN 5; 325 MG/1; MG/1
1 TABLET ORAL EVERY 6 HOURS PRN
Qty: 30 TABLET | Refills: 0 | Status: SHIPPED | OUTPATIENT
Start: 2021-03-30

## 2021-03-30 NOTE — PROGRESS NOTES
COVID-19 Virtual Visit     Assessment/Plan:    Problem List Items Addressed This Visit     None      Visit Diagnoses     Encounter for observation for suspected exposure to other biological agents ruled out    -  Primary          Patient continues to remain asymptomatic  She was informed of negative COVID test   She will continue to monitor for symptoms and notify us if she does develop any  She is scheduled for the COVID vaccine and will obtain it in April  Patient also reports that she has been experiencing left knee pain since Thursday  She denies any falls or injuries however she has been working often at her [employer] to get she [address] [employer]  Her left knee has been bothering her and we injectedher left knee at bedside today   With Medrol and lidocaine without any significant improvement  We will start her on Percocet  We also Ace bandage wrapped her  with minor improvement  We will order x-rays and refer her to orthopedics  Disposition:     I have spent 10 minutes directly with the patient  Greater than 50% of this time was spent in counseling/coordination of care regarding: diagnostic results, prognosis, risks and benefits of treatment options, instructions for management and impressions  Encounter provider Garrett Ferrer DO    Provider located at [address] [place] [address]  Via [address]  [phone]    Recent Visits  Date Type Provider Dept   03/29/21 [facility]  [address] , [address] [facility]   Showing recent visits within past 7 days and meeting all other requirements     Today's Visits  Date Type Provider Dept   03/30/21 [facility]  [address] , [address] [facility]   Showing today's visits and meeting all other requirements     Future Appointments  No visits were found meeting these conditions     Showing future appointments within next 150 days and meeting all other requirements        Patient agrees to participate in a virtual check in via telephone or video visit instead of presenting to the office to address urgent/immediate medical needs  Patient is aware this is a billable service  After connecting through Telephone, the patient was identified by name and date of birth  Shad Bertrand was informed that this was a telemedicine visit and that the exam was being conducted confidentially over secure lines  My office door was closed  No one else was in the room  Shad Bertrand acknowledged consent and understanding of privacy and security of the telemedicine visit  I informed the patient that I have reviewed her record in Epic and presented the opportunity for her to ask any questions regarding the visit today  The patient agreed to participate  It was my intent to perform this visit via video technology but the patient was not able to do a video connection so the visit was completed via audio telephone only  Subjective: Shad Bertrand is a 62 y o  female who has been screened for COVID-19  Symptom change since last report: unchanged  Patient is currently asymptomatic  Patient denies fever, chills, fatigue, malaise, congestion, rhinorrhea, sore throat, anosmia, loss of taste, cough, shortness of breath, chest tightness, abdominal pain, nausea, vomiting, diarrhea, myalgias and headaches  Troy Sullivan has been staying home and has isolated themselves in her home  She is taking care to not share personal items and is cleaning all surfaces that are touched often, like counters, tabletops, and doorknobs using household cleaning sprays or wipes  She is wearing a mask when she leaves her room       Date of exposure: 3/25/2021    Lab Results   Component Value Date    SARSCOV2 Negative 03/29/2021    [address] Not Detected 06/30/2020     Past Medical History:   Diagnosis Date    Depression     Diabetes [facility] )     [facility]     Fibromyalgia     GERD (gastroesophageal reflux disease)     Hypothyroid     Pneumonia     Shortness of breath      Past Surgical History:   Procedure Laterality Date    KNEE CARTILAGE SURGERY Right     TRACHEOSTOMY       Current Outpatient Medications   Medication Sig Dispense Refill    atorvastatin (LIPITOR) 20 mg tablet Take 1 tablet by mouth once daily 90 tablet 0    ergocalciferol (VITAMIN D2) 50,000 units Take 1 capsule (50,000 Units total) by mouth every 28 days for 3 days 3 capsule 0    folic acid (FOLVITE) 1 mg tablet Take 1,000 mcg by mouth daily      hydroxychloroquine (PLAQUENIL) 200 mg tablet Take 200 mg by mouth 2 (two) times a day with meals      meloxicam (MOBIC) 15 mg tablet Take 1 tablet (15 mg total) by mouth daily as needed for mild pain 90 tablet 1    metFORMIN (GLUCOPHAGE-XR) 750 mg 24 hr tablet TAKE 1 TABLET BY MOUTH WITH BREAKFAST 90 tablet 0    methotrexate 2 5 MG tablet TAKE 4 TABLETS BY MOUTH EVERY FRIDAY      pantoprazole (PROTONIX) 40 mg tablet Take 1 tablet (40 mg total) by mouth 2 (two) times a day 28 tablet 3    predniSONE 5 mg tablet       traMADol (ULTRAM) 50 mg tablet Take 1 tablet (50 mg total) by mouth every 8 (eight) hours as needed for severe pain 60 tablet 0    Vitamin D, Cholecalciferol, 25 MCG (1000 UT) TABS Take 2 tablets by mouth once daily 180 tablet 0     No current facility-administered medications for this visit  Allergies   Allergen Reactions    Penicillins     Wellbutrin [Bupropion]        Review of Systems   Constitutional: Negative for chills, fatigue and fever  HENT: Negative for congestion, rhinorrhea and sore throat  Respiratory: Negative for cough, chest tightness and shortness of breath  Gastrointestinal: Negative for abdominal pain, diarrhea, nausea and vomiting  Musculoskeletal: Negative for myalgias  Neurological: Negative for headaches  Objective: There were no vitals filed for this visit      Physical Exam  VIRTUAL VISIT DISCLAIMER    Anya Sellers acknowledges that she has consented to an online visit or consultation  She understands that the online visit is based solely on information provided by her, and that, in the absence of a face-to-face physical evaluation by the physician, the diagnosis she receives is both limited and provisional in terms of accuracy and completeness  This is not intended to replace a full medical face-to-face evaluation by the physician  Edward Sanabria understands and accepts these terms

## 2021-03-30 NOTE — PROGRESS NOTES
[facility]  coding oppertunities          Chart reviewed, no opportunity found: CHART REVIEWED, NO OPPORTUNITY FOUND

## 2021-04-05 ENCOUNTER — OFFICE VISIT (OUTPATIENT)
Dept: INTERNAL MEDICINE CLINIC | Facility: CLINIC | Age: 61
End: 2021-04-05
Payer: COMMERCIAL

## 2021-04-05 VITALS
OXYGEN SATURATION: 98 % | BODY MASS INDEX: 52.03 KG/M2 | HEIGHT: 60 IN | TEMPERATURE: 97.7 F | HEART RATE: 77 BPM | RESPIRATION RATE: 18 BRPM | WEIGHT: 265 LBS | DIASTOLIC BLOOD PRESSURE: 78 MMHG | SYSTOLIC BLOOD PRESSURE: 142 MMHG

## 2021-04-05 DIAGNOSIS — M25.562 ACUTE PAIN OF BOTH KNEES: Primary | ICD-10-CM

## 2021-04-05 DIAGNOSIS — K21.9 GASTROESOPHAGEAL REFLUX DISEASE WITHOUT ESOPHAGITIS: ICD-10-CM

## 2021-04-05 DIAGNOSIS — E78.01 FAMILIAL HYPERCHOLESTEROLEMIA: ICD-10-CM

## 2021-04-05 DIAGNOSIS — M05.79 RHEUMATOID ARTHRITIS INVOLVING MULTIPLE SITES WITH POSITIVE RHEUMATOID FACTOR (HCC): ICD-10-CM

## 2021-04-05 DIAGNOSIS — E66.01 MORBID OBESITY (HCC): ICD-10-CM

## 2021-04-05 DIAGNOSIS — E55.9 VITAMIN D DEFICIENCY: ICD-10-CM

## 2021-04-05 DIAGNOSIS — Z13.29 SCREENING FOR THYROID DISORDER: ICD-10-CM

## 2021-04-05 DIAGNOSIS — Z12.31 ENCOUNTER FOR SCREENING MAMMOGRAM FOR MALIGNANT NEOPLASM OF BREAST: ICD-10-CM

## 2021-04-05 DIAGNOSIS — E11.9 TYPE 2 DIABETES MELLITUS WITHOUT COMPLICATION, WITHOUT LONG-TERM CURRENT USE OF INSULIN (HCC): ICD-10-CM

## 2021-04-05 DIAGNOSIS — M25.561 ACUTE PAIN OF BOTH KNEES: Primary | ICD-10-CM

## 2021-04-05 DIAGNOSIS — K75.81 NASH (NONALCOHOLIC STEATOHEPATITIS): ICD-10-CM

## 2021-04-05 PROCEDURE — 99396 PREV VISIT EST AGE 40-64: CPT | Performed by: INTERNAL MEDICINE

## 2021-04-05 PROCEDURE — 3725F SCREEN DEPRESSION PERFORMED: CPT | Performed by: INTERNAL MEDICINE

## 2021-04-05 NOTE — PROGRESS NOTES
Assessment/Plan:    #Knee Pain  -chronically in both knees but worse in left now  -previous injection with minor improvement  -will obtain XR and refer to ortho  -now on percocet    #Rheumatoid Arthritis  -seeing rheum  -plans to start remicaide  -now on methotrexate, plaquinil, prednisone  -will refer to social work for coordination and information regarding insurance approval for infusion, patient's family is on a single income    #DM  -a1c 7 2  -continue metformin  -encouraged diet and exercise  -has a treadmill at home but has not started using it yet    #HLD  -currently on atorvastatin    #LERMA  -see on US  -seeing GI    #HTN  -BP elevated  -continue to monitor with lifestyle modifications for now    #Depression  -stable  -previously on ECT, lithium, wellbutrin, amitryptylline, xanax    #Obesity  BMI Counseling: Body mass index is 51 75 kg/m²  Discussed the patient's BMI with her  The BMI is above normal  Nutrition recommendations include 3-5 servings of fruits/vegetables daily, consuming healthier snacks, increasing intake of lean protein, reducing intake of saturated fat and trans fat and reducing intake of cholesterol  Exercise recommendations include vigorous aerobic physical activity for 75 minutes/week and exercising 3-5 times per week  #Health Maintenance  -routine labs and followup 6 months  -encouraged mammogram  -defers colonoscopy until age 73yo due to costs  -covid vaccine pending  -PNA 23 vaccine 2018    No problem-specific Assessment & Plan notes found for this encounter  Diagnoses and all orders for this visit:    Acute pain of both knees  -     Comprehensive metabolic panel; Future  -     CBC and differential; Future    Vitamin D deficiency  -     Comprehensive metabolic panel;  Future  -     CBC and differential; Future    Type 2 diabetes mellitus without complication, without long-term current use of insulin (HCC)  -     Hemoglobin A1C; Future  -     Comprehensive metabolic panel; Future  -     CBC and differential; Future    Familial hypercholesterolemia  -     Comprehensive metabolic panel; Future  -     Lipid Panel With Direct LDL; Future  -     CBC and differential; Future    Encounter for screening mammogram for malignant neoplasm of breast  -     Comprehensive metabolic panel; Future  -     CBC and differential; Future  -     Mammo screening bilateral w 3d & cad; Future    Rheumatoid arthritis involving multiple sites with positive rheumatoid factor (HCC)  -     Comprehensive metabolic panel; Future  -     CBC and differential; Future    LERMA (nonalcoholic steatohepatitis)  -     Comprehensive metabolic panel; Future  -     CBC and differential; Future    Screening for thyroid disorder  -     TSH, 3rd generation with Free T4 reflex;  Future    Gastroesophageal reflux disease without esophagitis    Morbid obesity (HCC)            Current Outpatient Medications:     atorvastatin (LIPITOR) 20 mg tablet, Take 1 tablet by mouth once daily, Disp: 90 tablet, Rfl: 0    folic acid (FOLVITE) 1 mg tablet, Take 1,000 mcg by mouth daily, Disp: , Rfl:     hydroxychloroquine (PLAQUENIL) 200 mg tablet, Take 200 mg by mouth 2 (two) times a day with meals, Disp: , Rfl:     meloxicam (MOBIC) 15 mg tablet, Take 1 tablet (15 mg total) by mouth daily as needed for mild pain, Disp: 90 tablet, Rfl: 1    metFORMIN (GLUCOPHAGE-XR) 750 mg 24 hr tablet, TAKE 1 TABLET BY MOUTH WITH BREAKFAST, Disp: 90 tablet, Rfl: 0    methotrexate 2 5 MG tablet, TAKE 4 TABLETS BY MOUTH EVERY FRIDAY, Disp: , Rfl:     oxyCODONE-acetaminophen (PERCOCET) 5-325 mg per tablet, Take 1 tablet by mouth every 6 (six) hours as needed for moderate painMax Daily Amount: 4 tablets, Disp: 30 tablet, Rfl: 0    pantoprazole (PROTONIX) 40 mg tablet, Take 1 tablet (40 mg total) by mouth 2 (two) times a day, Disp: 28 tablet, Rfl: 3    predniSONE 5 mg tablet, , Disp: , Rfl:     traMADol (ULTRAM) 50 mg tablet, Take 1 tablet (50 mg total) by mouth every 8 (eight) hours as needed for severe pain, Disp: 60 tablet, Rfl: 0    Vitamin D, Cholecalciferol, 25 MCG (1000 UT) TABS, Take 2 tablets by mouth once daily, Disp: 180 tablet, Rfl: 0    ergocalciferol (VITAMIN D2) 50,000 units, Take 1 capsule (50,000 Units total) by mouth every 28 days for 3 days, Disp: 3 capsule, Rfl: 0    Subjective:      Patient ID: Ana Lemon is a 62 y o  female  HPI       Patient presents for routine checkup  Denies any recent hospitalizations or surgeries  States her left knee pain is improved with Percocet  We previously injected her left knee at the last visit however it only gave her temper relief  She is due for x-rays of the knee and will obtain that  We also encouraged her to follow-up with orthopedics if this still continues to become an issue  Her CRP is stable as well as her sed rate  Her A1c is elevated at 7 2  She is currently taking metformin  She reports that she is not adhering to a diabetic diet and we encouraged her to do so  She is not ambulating as she should be  She has a treadmill at home and we encouraged her to use it at least 30 minutes every day  She has rheumatoid arthritis and continues to follow-up with rheumatology  Rheum is considering starting her on Remicade infusions since methotrexate  And Plaquenil are not providing her adequate relief  Patient has hyperlipidemia and remains on atorvastatin  We will continue with this  Patient is due for repeat colonoscopy however she defers this and would like to hold off until she is 73years old  She is due for mammogram we will give her the referral for it  She will return to care in 6 months with labs  Her blood pressure is elevated today however she reports that she has been eating a lot of salty foods and she will start cutting back  She will also increase her exercise      The following portions of the patient's history were reviewed and updated as appropriate: allergies, current medications, past family history, past medical history, past social history, past surgical history and problem list     Review of Systems   Constitutional: Negative for activity change, appetite change, chills, fatigue and fever  HENT: Negative for congestion, ear pain, facial swelling, hearing loss, sore throat, tinnitus and trouble swallowing  Eyes: Negative for photophobia and visual disturbance  Respiratory: Negative for cough, shortness of breath and wheezing  Cardiovascular: Negative for chest pain and leg swelling  Gastrointestinal: Negative for abdominal distention, abdominal pain, blood in stool, constipation, diarrhea, nausea and vomiting  Genitourinary: Negative for difficulty urinating, dysuria and pelvic pain  Musculoskeletal: Positive for arthralgias, back pain, gait problem (left knee), joint swelling (left knee) and myalgias  Negative for neck pain and neck stiffness  Skin: Negative for rash and wound  Neurological: Negative for dizziness, tremors, weakness, light-headedness and headaches  Objective:      /78 (BP Location: Left arm, Patient Position: Sitting, Cuff Size: Adult)   Pulse 77   Temp 97 7 °F (36 5 °C) (Tympanic)   Resp 18   Ht 5' (1 524 m)   Wt 120 kg (265 lb)   SpO2 98%   BMI 51 75 kg/m²          Physical Exam  Vitals signs reviewed  Constitutional:       Appearance: Normal appearance  She is well-developed  She is obese  HENT:      Head: Normocephalic and atraumatic  Comments: Nodule on forehead     Right Ear: Tympanic membrane, ear canal and external ear normal  There is no impacted cerumen  Left Ear: Tympanic membrane, ear canal and external ear normal  There is no impacted cerumen  Nose: Nose normal    Eyes:      Conjunctiva/sclera: Conjunctivae normal       Pupils: Pupils are equal, round, and reactive to light  Neck:      Musculoskeletal: Normal range of motion and neck supple  Thyroid: No thyromegaly        Vascular: No JVD  Cardiovascular:      Rate and Rhythm: Normal rate and regular rhythm  Heart sounds: Normal heart sounds  No murmur  Pulmonary:      Effort: Pulmonary effort is normal  No respiratory distress  Breath sounds: Normal breath sounds  No stridor  No wheezing, rhonchi or rales  Abdominal:      General: Bowel sounds are normal  There is no distension  Palpations: Abdomen is soft  There is no mass  Tenderness: There is no abdominal tenderness  There is no right CVA tenderness, left CVA tenderness, guarding or rebound  Musculoskeletal: Normal range of motion  General: Swelling (right index finger nodule) and tenderness (diffuse joints especially left knee) present  Right lower leg: No edema  Left lower leg: No edema  Lymphadenopathy:      Cervical: No cervical adenopathy  Skin:     General: Skin is warm and dry  Findings: No erythema or rash  Neurological:      Mental Status: She is alert and oriented to person, place, and time  Deep Tendon Reflexes: Reflexes are normal and symmetric  This note was completed in part utilizing [employer] direct voice recognition software  Grammatical errors, random word insertion, spelling mistakes, and incomplete sentences may be an occasional consequence of the system secondary to software limitations, ambient noise and hardware issues  At the time of dictation, efforts were made to edit, clarify and /or correct errors  Please read the chart carefully and recognize, using context, where substitutions have occurred  If you have any questions or concerns about the context, text or information contained within the body of this dictation, please contact myself, the provider, for further clarification

## 2021-04-06 ENCOUNTER — PATIENT OUTREACH (OUTPATIENT)
Dept: INTERNAL MEDICINE CLINIC | Facility: CLINIC | Age: 61
End: 2021-04-06

## 2021-04-06 NOTE — PROGRESS NOTES
Referral received from Dr Kesha Dhaliwal with request for [facility] to outreach patient and assist with confirmation on insurance coverage for patient's needed infusion treatments  Patient would need to verify coverage directly with her insurance plan by calling member services  [facility] SW will encourage patient to do this and will also provide her with information for [facility], the [employer], and the [employer] if needed as well  Attempted to outreach patient to discuss the above information; no answer as patient's phone went directly to voicemail    Voicemail left and awaiting return call to further assist   Update routed to Dr Kesha Dhaliwal

## 2021-04-08 ENCOUNTER — PATIENT OUTREACH (OUTPATIENT)
Dept: INTERNAL MEDICINE CLINIC | Facility: CLINIC | Age: 61
End: 2021-04-08

## 2021-04-08 NOTE — PROGRESS NOTES
2nd outreach attempt to patient to assist with insurance coverage questions regarding patient's needed infusion treatments  No answer as patient's phone went directly to voicemail  Voicemail left and awaiting return call to further assist   Will attempt additional outreach at later date

## 2021-04-09 ENCOUNTER — IMMUNIZATIONS (OUTPATIENT)
Dept: FAMILY MEDICINE CLINIC | Facility: HOSPITAL | Age: 61
End: 2021-04-09

## 2021-04-09 DIAGNOSIS — Z23 ENCOUNTER FOR IMMUNIZATION: Primary | ICD-10-CM

## 2021-04-09 PROCEDURE — 0001A SARS-COV-2 / COVID-19 MRNA VACCINE (PFIZER-BIONTECH) 30 MCG: CPT

## 2021-04-09 PROCEDURE — 91300 SARS-COV-2 / COVID-19 MRNA VACCINE (PFIZER-BIONTECH) 30 MCG: CPT

## 2021-04-12 ENCOUNTER — VBI (OUTPATIENT)
Dept: ADMINISTRATIVE | Facility: OTHER | Age: 61
End: 2021-04-12

## 2021-04-13 ENCOUNTER — PATIENT OUTREACH (OUTPATIENT)
Dept: INTERNAL MEDICINE CLINIC | Facility: CLINIC | Age: 61
End: 2021-04-13

## 2021-04-13 NOTE — PROGRESS NOTES
3rd outreach attempt to patient to assist with insurance coverage questions regarding patient's needed infusion treatments  No answer as patient's phone went directly to voicemail   Voicemail left and awaiting return call to further assist   Will mail Unable to Reach letter, close case at this time, and will remain available to assist with any future needs    Update routed to Dr Ana Calzada

## 2021-04-13 NOTE — LETTER
Edward [address]  [phone]    Re: Insurance Coverage   4/13/2021       Dear Rafi Bolden am a [address]  and wanted to be certain you had information to contact me should you desire assistance with or have questions about non-medical aspects of your care such as [but not limited to] medical insurance, housing, transportation, material needs, or emergency needs, as I have been unable to reach you  If I do not have an answer I will assist you in finding the appropriate agency or individual who can help  Please feel free to contact me at [phone]  Thank You      Sincerely,     TEODORO Carlisle

## 2021-04-14 ENCOUNTER — PATIENT OUTREACH (OUTPATIENT)
Dept: INTERNAL MEDICINE CLINIC | Facility: CLINIC | Age: 61
End: 2021-04-14

## 2021-04-14 NOTE — PROGRESS NOTES
Voicemail received from patient in response to [facility]'s initial outreach attempts  [facility] SW to return call to patient to further assist     Spoke with patient who confirmed she would like to explore financial assistance for her upcoming infusion treatments through her Rheumatology Office as her insurance ([id number] [place] [address] through her 's employer) only covers 70% of the infusion cost, leaving patient responsible for 30% of the cost   [facility] SW provided patient with Financial Counselor and Central Billing contact information via email per her request; patient will outreach to further discuss financial assistance eligibility  [facility] SW also provided patient information for [facility], Medical Assistance (informed patient this is based on household income, which she will review with her  to determine eligibility), and SSD application information  Patient stated she had SSD in the past but no longer receives this  Patient informed that her rheumatoid arthritis is preventing her from working at this time; patient and her  only have 1 income and she is interested in reapplying for SSD  Patient stated she would review the SSD information and application process and will outreach [facility] SW if assistance is needed  [facility] SW will refer to [facility] for assistance with this application if needed  No other needs at this time  [facility] SW will remain available to assist as needed    Update routed to Dr Carloz Pablo

## 2021-04-16 ENCOUNTER — APPOINTMENT (OUTPATIENT)
Dept: RADIOLOGY | Facility: MEDICAL CENTER | Age: 61
End: 2021-04-16
Payer: COMMERCIAL

## 2021-04-16 ENCOUNTER — OFFICE VISIT (OUTPATIENT)
Dept: OBGYN CLINIC | Facility: MEDICAL CENTER | Age: 61
End: 2021-04-16
Payer: COMMERCIAL

## 2021-04-16 VITALS
TEMPERATURE: 98.4 F | SYSTOLIC BLOOD PRESSURE: 137 MMHG | WEIGHT: 260 LBS | BODY MASS INDEX: 51.04 KG/M2 | HEIGHT: 60 IN | DIASTOLIC BLOOD PRESSURE: 82 MMHG | HEART RATE: 78 BPM

## 2021-04-16 DIAGNOSIS — M25.562 LEFT KNEE PAIN, UNSPECIFIED CHRONICITY: ICD-10-CM

## 2021-04-16 DIAGNOSIS — T14.8XXA CONTUSION OF BONE: ICD-10-CM

## 2021-04-16 DIAGNOSIS — M25.562 ACUTE PAIN OF BOTH KNEES: ICD-10-CM

## 2021-04-16 DIAGNOSIS — M17.0 PRIMARY OSTEOARTHRITIS OF BOTH KNEES: ICD-10-CM

## 2021-04-16 DIAGNOSIS — M25.562 LEFT KNEE PAIN, UNSPECIFIED CHRONICITY: Primary | ICD-10-CM

## 2021-04-16 DIAGNOSIS — M25.561 RIGHT KNEE PAIN, UNSPECIFIED CHRONICITY: ICD-10-CM

## 2021-04-16 DIAGNOSIS — M25.561 ACUTE PAIN OF BOTH KNEES: ICD-10-CM

## 2021-04-16 PROCEDURE — 73564 X-RAY EXAM KNEE 4 OR MORE: CPT

## 2021-04-16 PROCEDURE — 3008F BODY MASS INDEX DOCD: CPT | Performed by: ORTHOPAEDIC SURGERY

## 2021-04-16 PROCEDURE — 1036F TOBACCO NON-USER: CPT | Performed by: ORTHOPAEDIC SURGERY

## 2021-04-16 PROCEDURE — 99244 OFF/OP CNSLTJ NEW/EST MOD 40: CPT | Performed by: ORTHOPAEDIC SURGERY

## 2021-04-16 RX ORDER — DICLOFENAC SODIUM 75 MG/1
75 TABLET, DELAYED RELEASE ORAL 2 TIMES DAILY
Qty: 60 TABLET | Refills: 1 | Status: SHIPPED | OUTPATIENT
Start: 2021-04-16

## 2021-04-16 NOTE — PROGRESS NOTES
Assessment/Plan     1  Left knee pain, unspecified chronicity    2  Acute pain of both knees    3  Right knee pain, unspecified chronicity    4  Primary osteoarthritis of both knees    5  Contusion of bone      Orders Placed This Encounter   Procedures    XR knee 4+ vw left injury    XR knee 4+ vw right injury    Ambulatory referral to Physical Therapy     · She has severe right knee and moderate to severe left knee osteoarthritis and bone contusion left knee   · Discussed with patient conservative treatments: steroid injections, physical therapy, medications, bracing, visco supplementation injections, modifying activities and weight loss  · Stop taking the Meloxicam and avoid taking any narcotics  Prescribed patient Diclofenac prn pain, medications warnings were reviewed with patient  · Physical therapy order was given out today   · May do steroid injections in 3 months   · Continue working on weight loss   · Declined short hinged knee brace   Return in about 6 weeks (around 5/28/2021) for Recheck bilateral knee   I answered all of the patient's questions during the visit and provided education of the patient's condition during the visit  The patient verbalized understanding of the information given and agrees with the plan  This note was dictated using [email or web address] software  It may contain errors including improperly dictated words  Please contact physician directly for any questions  History of Present Illness   Chief complaint:   Chief Complaint   Patient presents with    Left Knee - Pain    Right Knee - Pain       HPI: Paresh Marshall is a 62 y o  female that c/o bilateral knee pain  She was referred by her PCP Dr Je Jenkins  She states she had a fall on four 321  She states she missed a step and fell forward landing directly onto her knees  She was seen by her PCP on 04/05/2021 and had x-rays taken of bilateral knee and had a left knee steroid injection and states she had no relief from the injection    She was also prescribed Percocet and meloxicam as needed for pain with no relief  She feels her pain is better  She states she is having stiffness achy pain that comes and goes over the anterior aspect of bilateral knee worse on the left  She does feel her knees are unstable  She denies any locking  Pain is worse with transitional positions, walking, stairs, and standing  Her pain level is a 7/10  She does have history of right knee arthroscopy for meniscal tear 10+years ago  She has no history having injections or surgeries on the right knee and physical therapy bilaterally  She had you 1st COVID vaccination on 21 and is due for the 2nd injection on 5/3/21  She is working with a nutritionist for weight loss  ROS:    See HPI for musculoskeletal review     All other systems reviewed are negative     Historical Information   Past Medical History:   Diagnosis Date    Depression     [facility] [address] [facility]     [facility] mellitus ([facility] [place] [facility]     Fibromyalgia     GERD (gastroesophageal reflux disease)     Hypothyroid     Pneumonia     Shortness of breath      Past Surgical History:   Procedure Laterality Date    KNEE CARTILAGE SURGERY Right     TRACHEOSTOMY       Social History   Social History     Substance and Sexual Activity   Alcohol Use Yes    Alcohol/week: 2 0 standard drinks    Types: 2 Glasses of wine per week    Frequency: Monthly or less    Drinks per session: 1 or 2    Binge frequency: Never     Social History     Substance and Sexual Activity   Drug Use No     Social History     Tobacco Use   Smoking Status Former Smoker    Packs/day: 0 25    Years: 10 00    Pack years: 2 50    Quit date: 1997    Years since quittin 2   Smokeless Tobacco Never Used     Family History:   Family History   Problem Relation Age of Onset    Pneumonia Father     Asthma Brother        Current Outpatient Medications on File Prior to Visit   Medication Sig Dispense Refill    atorvastatin (LIPITOR) 20 mg tablet Take 1 tablet by mouth once daily 90 tablet 0    ergocalciferol (VITAMIN D2) 50,000 units Take 1 capsule (50,000 Units total) by mouth every 28 days for 3 days 3 capsule 0    folic acid (FOLVITE) 1 mg tablet Take 1,000 mcg by mouth daily      hydroxychloroquine (PLAQUENIL) 200 mg tablet Take 200 mg by mouth 2 (two) times a day with meals      meloxicam (MOBIC) 15 mg tablet Take 1 tablet (15 mg total) by mouth daily as needed for mild pain 90 tablet 1    metFORMIN (GLUCOPHAGE-XR) 750 mg 24 hr tablet TAKE 1 TABLET BY MOUTH WITH BREAKFAST 90 tablet 0    methotrexate 2 5 MG tablet TAKE 4 TABLETS BY MOUTH EVERY FRIDAY      oxyCODONE-acetaminophen (PERCOCET) 5-325 mg per tablet Take 1 tablet by mouth every 6 (six) hours as needed for moderate painMax Daily Amount: 4 tablets 30 tablet 0    pantoprazole (PROTONIX) 40 mg tablet Take 1 tablet (40 mg total) by mouth 2 (two) times a day 28 tablet 3    predniSONE 5 mg tablet       traMADol (ULTRAM) 50 mg tablet Take 1 tablet (50 mg total) by mouth every 8 (eight) hours as needed for severe pain 60 tablet 0    Vitamin D, Cholecalciferol, 25 MCG (1000 UT) TABS Take 2 tablets by mouth once daily 180 tablet 0     No current facility-administered medications on file prior to visit        Allergies   Allergen Reactions    Penicillins     Wellbutrin [Bupropion]        Current Outpatient Medications on File Prior to Visit   Medication Sig Dispense Refill    atorvastatin (LIPITOR) 20 mg tablet Take 1 tablet by mouth once daily 90 tablet 0    ergocalciferol (VITAMIN D2) 50,000 units Take 1 capsule (50,000 Units total) by mouth every 28 days for 3 days 3 capsule 0    folic acid (FOLVITE) 1 mg tablet Take 1,000 mcg by mouth daily      hydroxychloroquine (PLAQUENIL) 200 mg tablet Take 200 mg by mouth 2 (two) times a day with meals      meloxicam (MOBIC) 15 mg tablet Take 1 tablet (15 mg total) by mouth daily as needed for mild pain 90 tablet 1    metFORMIN (GLUCOPHAGE-XR) 750 mg 24 hr tablet TAKE 1 TABLET BY MOUTH WITH BREAKFAST 90 tablet 0    methotrexate 2 5 MG tablet TAKE 4 TABLETS BY MOUTH EVERY FRIDAY      oxyCODONE-acetaminophen (PERCOCET) 5-325 mg per tablet Take 1 tablet by mouth every 6 (six) hours as needed for moderate painMax Daily Amount: 4 tablets 30 tablet 0    pantoprazole (PROTONIX) 40 mg tablet Take 1 tablet (40 mg total) by mouth 2 (two) times a day 28 tablet 3    predniSONE 5 mg tablet       traMADol (ULTRAM) 50 mg tablet Take 1 tablet (50 mg total) by mouth every 8 (eight) hours as needed for severe pain 60 tablet 0    Vitamin D, Cholecalciferol, 25 MCG (1000 UT) TABS Take 2 tablets by mouth once daily 180 tablet 0     No current facility-administered medications on file prior to visit  Objective   Vitals: Blood pressure 137/82, pulse 78, temperature 98 4 °F (36 9 °C), height 5' (1 524 m), weight 118 kg (260 lb), not currently breastfeeding  ,Body mass index is 50 78 kg/m²      PE:  AAOx 3  WDWN  Hearing intact, no drainage from eyes  Regular rate  no audible wheezing  no abdominal distension  LE compartments soft, skin intact    rightknee:    Appearance:  no swelling   No ecchymosis  no obvious joint deformity   No effusion  Anterior and medial scar  Anterior lower leg scar 2 cm   Palpation/Tenderness:  +TTP over medial joint line  + TTP over lateral joint line   No TTP over patella  No TTP over patellar tendon  No TTP over pes anserine bursa  Active Range of Motion:  AROM: 0-90  PROM 0-100   Special Tests:  Valgus Stress Test:  negative  Varus Stress Test:  negative   No ipsilateral hip pain with ROM      leftknee:    Appearance:  no swelling   No ecchymosis  no obvious joint deformity   No effusion  Palpation/Tenderness:  +TTP over medial joint line  No TTP over lateral joint line   No TTP over patella  No TTP over patellar tendon  + TTP over pes anserine bursa  Active Range of Motion:  AROM: 0-90  PROM 0-95  Special Tests:  Valgus Stress Test: negative  Varus Stress Test:  negative   No ipsilateral hip pain with ROM      bilateralLE:    Sensation grossly intact L4,S1   Palpable posterior tibial   pulse  AT/GS  intact    Imaging Studies: I have personally reviewed pertinent films in PACS  rightknee:  Severe DJD  Left knee moderate to severe DJD        Scribe Attestation    I,:  Ana Suggs am acting as a scribe while in the presence of the attending physician :       I,:  Ediila Danielson DO personally performed the services described in this documentation    as scribed in my presence :

## 2021-04-16 NOTE — LETTER
April 16, 2021     Josselin Rodriguez DO  [address] [address]    Patient: Curt Rod   YOB: 1960   Date of Visit: 4/16/2021       Dear Dr Nadiya Manriquez:    Thank you for referring Curt Rod to me for evaluation  Below are my notes for this consultation  If you have questions, please do not hesitate to call me  I look forward to following your patient along with you  Sincerely,        Mendel Phan DO        CC: No Recipients  Mendel Band,   4/16/2021  2:51 PM  Sign when Signing Visit   Assessment/Plan     1  Left knee pain, unspecified chronicity    2  Acute pain of both knees    3  Right knee pain, unspecified chronicity    4  Primary osteoarthritis of both knees    5  Contusion of bone      Orders Placed This Encounter   Procedures    XR knee 4+ vw left injury    XR knee 4+ vw right injury    Ambulatory referral to Physical Therapy     · She has severe right knee and moderate to severe left knee osteoarthritis and bone contusion left knee   · Discussed with patient conservative treatments: steroid injections, physical therapy, medications, bracing, visco supplementation injections, modifying activities and weight loss  · Stop taking the Meloxicam and avoid taking any narcotics  Prescribed patient Diclofenac prn pain, medications warnings were reviewed with patient  · Physical therapy order was given out today   · May do steroid injections in 3 months   · Continue working on weight loss   · Declined short hinged knee brace   Return in about 6 weeks (around 5/28/2021) for Recheck bilateral knee   I answered all of the patient's questions during the visit and provided education of the patient's condition during the visit  The patient verbalized understanding of the information given and agrees with the plan  This note was dictated using [employer] software  It may contain errors including improperly dictated words    Please contact physician directly for any questions  History of Present Illness   Chief complaint:   Chief Complaint   Patient presents with    Left Knee - Pain    Right Knee - Pain       HPI: Lauren Chambers is a 62 y o  female that c/o bilateral knee pain  She was referred by her PCP Dr Lucy Neal  She states she had a fall on [address]  She states she missed a step and fell forward landing directly onto her knees  She was seen by her PCP on 04/05/2021 and had x-rays taken of bilateral knee and had a left knee steroid injection and states she had no relief from the injection  She was also prescribed Percocet and meloxicam as needed for pain with no relief  She feels her pain is better  She states she is having stiffness achy pain that comes and goes over the anterior aspect of bilateral knee worse on the left  She does feel her knees are unstable  She denies any locking  Pain is worse with transitional positions, walking, stairs, and standing  Her pain level is a 7/10  She does have history of right knee arthroscopy for meniscal tear 10+years ago  She has no history having injections or surgeries on the right knee and physical therapy bilaterally  She had you 1st COVID vaccination on 4/9/21 and is due for the 2nd injection on 5/3/21  She is working with a nutritionist for weight loss  ROS:    See HPI for musculoskeletal review     All other systems reviewed are negative     Historical Information   Past Medical History:   Diagnosis Date    Depression     [facility] [address]     [facility]     Fibromyalgia     GERD (gastroesophageal reflux disease)     Hypothyroid     Pneumonia     Shortness of breath      Past Surgical History:   Procedure Laterality Date    KNEE CARTILAGE SURGERY Right     TRACHEOSTOMY       Social History   Social History     Substance and Sexual Activity   Alcohol Use Yes    Alcohol/week: 2 0 standard drinks    Types: 2 Glasses of wine per week    Frequency: Monthly or less    Drinks per session: 1 or 2    Binge frequency: Never     Social History     Substance and Sexual Activity   Drug Use No     Social History     Tobacco Use   Smoking Status Former Smoker    Packs/day: 0 25    Years: 10 00    Pack years: 2 50    Quit date: 1997    Years since quittin 2   Smokeless Tobacco Never Used     Family History:   Family History   Problem Relation Age of Onset    Pneumonia Father     Asthma Brother        Current Outpatient Medications on File Prior to Visit   Medication Sig Dispense Refill    atorvastatin (LIPITOR) 20 mg tablet Take 1 tablet by mouth once daily 90 tablet 0    ergocalciferol (VITAMIN D2) 50,000 units Take 1 capsule (50,000 Units total) by mouth every 28 days for 3 days 3 capsule 0    folic acid (FOLVITE) 1 mg tablet Take 1,000 mcg by mouth daily      hydroxychloroquine (PLAQUENIL) 200 mg tablet Take 200 mg by mouth 2 (two) times a day with meals      meloxicam (MOBIC) 15 mg tablet Take 1 tablet (15 mg total) by mouth daily as needed for mild pain 90 tablet 1    metFORMIN (GLUCOPHAGE-XR) 750 mg 24 hr tablet TAKE 1 TABLET BY MOUTH WITH BREAKFAST 90 tablet 0    methotrexate 2 5 MG tablet TAKE 4 TABLETS BY MOUTH EVERY FRIDAY      oxyCODONE-acetaminophen (PERCOCET) 5-325 mg per tablet Take 1 tablet by mouth every 6 (six) hours as needed for moderate painMax Daily Amount: 4 tablets 30 tablet 0    pantoprazole (PROTONIX) 40 mg tablet Take 1 tablet (40 mg total) by mouth 2 (two) times a day 28 tablet 3    predniSONE 5 mg tablet       traMADol (ULTRAM) 50 mg tablet Take 1 tablet (50 mg total) by mouth every 8 (eight) hours as needed for severe pain 60 tablet 0    Vitamin D, Cholecalciferol, 25 MCG (1000 UT) TABS Take 2 tablets by mouth once daily 180 tablet 0     No current facility-administered medications on file prior to visit        Allergies   Allergen Reactions    Penicillins     Wellbutrin [Bupropion]        Current Outpatient Medications on File Prior to Visit   Medication Sig Dispense Refill    atorvastatin (LIPITOR) 20 mg tablet Take 1 tablet by mouth once daily 90 tablet 0    ergocalciferol (VITAMIN D2) 50,000 units Take 1 capsule (50,000 Units total) by mouth every 28 days for 3 days 3 capsule 0    folic acid (FOLVITE) 1 mg tablet Take 1,000 mcg by mouth daily      hydroxychloroquine (PLAQUENIL) 200 mg tablet Take 200 mg by mouth 2 (two) times a day with meals      meloxicam (MOBIC) 15 mg tablet Take 1 tablet (15 mg total) by mouth daily as needed for mild pain 90 tablet 1    metFORMIN (GLUCOPHAGE-XR) 750 mg 24 hr tablet TAKE 1 TABLET BY MOUTH WITH BREAKFAST 90 tablet 0    methotrexate 2 5 MG tablet TAKE 4 TABLETS BY MOUTH EVERY FRIDAY      oxyCODONE-acetaminophen (PERCOCET) 5-325 mg per tablet Take 1 tablet by mouth every 6 (six) hours as needed for moderate painMax Daily Amount: 4 tablets 30 tablet 0    pantoprazole (PROTONIX) 40 mg tablet Take 1 tablet (40 mg total) by mouth 2 (two) times a day 28 tablet 3    predniSONE 5 mg tablet       traMADol (ULTRAM) 50 mg tablet Take 1 tablet (50 mg total) by mouth every 8 (eight) hours as needed for severe pain 60 tablet 0    Vitamin D, Cholecalciferol, 25 MCG (1000 UT) TABS Take 2 tablets by mouth once daily 180 tablet 0     No current facility-administered medications on file prior to visit  Objective   Vitals: Blood pressure 137/82, pulse 78, temperature 98 4 °F (36 9 °C), height 5' (1 524 m), weight 118 kg (260 lb), not currently breastfeeding  ,Body mass index is 50 78 kg/m²      PE:  AAOx 3  WDWN  Hearing intact, no drainage from eyes  Regular rate  no audible wheezing  no abdominal distension  LE compartments soft, skin intact    rightknee:    Appearance:  no swelling   No ecchymosis  no obvious joint deformity   No effusion  Anterior and medial scar  Anterior lower leg scar 2 cm   Palpation/Tenderness:  +TTP over medial joint line  + TTP over lateral joint line   No TTP over patella  No TTP over patellar tendon  No TTP over pes anserine bursa  Active Range of Motion:  AROM: 0-90  PROM 0-100   Special Tests:  Valgus Stress Test:  negative  Varus Stress Test:  negative   No ipsilateral hip pain with ROM      leftknee:    Appearance:  no swelling   No ecchymosis  no obvious joint deformity   No effusion  Palpation/Tenderness:  +TTP over medial joint line  No TTP over lateral joint line   No TTP over patella  No TTP over patellar tendon  + TTP over pes anserine bursa  Active Range of Motion:  AROM: 0-90  PROM 0-95  Special Tests:  Valgus Stress Test:  negative  Varus Stress Test:  negative   No ipsilateral hip pain with ROM      bilateralLE:    Sensation grossly intact L4,S1   Palpable posterior tibial   pulse  AT/GS  intact    Imaging Studies: I have personally reviewed pertinent films in PACS  rightknee:  Severe DJD  Left knee moderate to severe DJD        Scribe Attestation    I,:  Stuart Suggs am acting as a scribe while in the presence of the attending physician :       I,:  Reanna Armijo DO personally performed the services described in this documentation    as scribed in my presence :

## 2021-04-20 DIAGNOSIS — E11.9 TYPE 2 DIABETES MELLITUS WITHOUT COMPLICATION, WITHOUT LONG-TERM CURRENT USE OF INSULIN (HCC): ICD-10-CM

## 2021-04-20 RX ORDER — METFORMIN HYDROCHLORIDE 750 MG/1
TABLET, EXTENDED RELEASE ORAL
Qty: 90 TABLET | Refills: 0 | Status: SHIPPED | OUTPATIENT
Start: 2021-04-20 | End: 2021-07-25

## 2021-04-30 DIAGNOSIS — E78.01 FAMILIAL HYPERCHOLESTEROLEMIA: ICD-10-CM

## 2021-04-30 RX ORDER — ATORVASTATIN CALCIUM 20 MG/1
TABLET, FILM COATED ORAL
Qty: 90 TABLET | Refills: 2 | Status: SHIPPED | OUTPATIENT
Start: 2021-04-30 | End: 2022-04-29 | Stop reason: SDUPTHER

## 2021-05-04 ENCOUNTER — IMMUNIZATIONS (OUTPATIENT)
Dept: FAMILY MEDICINE CLINIC | Facility: HOSPITAL | Age: 61
End: 2021-05-04

## 2021-05-04 DIAGNOSIS — Z23 ENCOUNTER FOR IMMUNIZATION: Primary | ICD-10-CM

## 2021-05-04 PROCEDURE — 0002A SARS-COV-2 / COVID-19 MRNA VACCINE (PFIZER-BIONTECH) 30 MCG: CPT

## 2021-05-04 PROCEDURE — 91300 SARS-COV-2 / COVID-19 MRNA VACCINE (PFIZER-BIONTECH) 30 MCG: CPT

## 2021-05-12 ENCOUNTER — EVALUATION (OUTPATIENT)
Dept: PHYSICAL THERAPY | Facility: CLINIC | Age: 61
End: 2021-05-12
Payer: COMMERCIAL

## 2021-05-12 DIAGNOSIS — M25.562 CHRONIC PAIN OF BOTH KNEES: Primary | ICD-10-CM

## 2021-05-12 DIAGNOSIS — G89.29 CHRONIC PAIN OF BOTH KNEES: Primary | ICD-10-CM

## 2021-05-12 DIAGNOSIS — M17.0 PRIMARY OSTEOARTHRITIS OF BOTH KNEES: ICD-10-CM

## 2021-05-12 DIAGNOSIS — M25.561 CHRONIC PAIN OF BOTH KNEES: Primary | ICD-10-CM

## 2021-05-12 PROCEDURE — 97161 PT EVAL LOW COMPLEX 20 MIN: CPT | Performed by: PHYSICAL THERAPIST

## 2021-05-12 PROCEDURE — 97110 THERAPEUTIC EXERCISES: CPT | Performed by: PHYSICAL THERAPIST

## 2021-05-12 NOTE — PROGRESS NOTES
PT Evaluation     Today's date: 2021  Patient name: Lebron Malik  : 1960  MRN: [id number]  Referring provider: Jeff Roberto DO  Dx:   Encounter Diagnosis     ICD-10-CM    1  Chronic pain of both knees  M25 561     M25 562     G89 29    2  Primary osteoarthritis of both knees  M17 0 Ambulatory referral to Physical Therapy       Start Time: 420  Stop Time: 1555  Total time in clinic (min): 60 minutes    Assessment  Assessment details: 62year old female with B primary OA B knees with pain from -10/10, limited ROM, strength and functional deficits with weight bearing activity  Pt ambulates with B antalgic limp R more pronounced than L, walking only 3-5 min consecutively  ROM limited in flx B to 98 degrees, and ext R knee -5  Unable to eleanor socks and shoes I, limited cooking, cleaning, laundry  B squat 25%  FOTO 28/100  Pt would benefit from a course of PT at this time in order to control pain and improve ROM, strength and function B knees  Impairments: abnormal gait, abnormal or restricted ROM, activity intolerance, impaired physical strength, lacks appropriate home exercise program, pain with function and weight-bearing intolerance  Functional limitations: only walking 3-5 min, step to gait on steps, drives I, dress no shoes/bathe I,  gets her out of bed, limited cook, clean, laundry  Symptom irritability: highBarriers to therapy: Hx fibromyalgia  Understanding of Dx/Px/POC: good   Prognosis: fair    Goals  STG- 4 weeks 2021  1  I HEP in order to reach full rehab potential  2  Decrease pain to 3-7 range in order to function with less pain  3  Increase ROM B knees to 105 flx and R knee to full ext  4  Increase walking endurance to 5-10 min    LTG- 12 weeks 2021  1  Decrease pain to 0-3 range in order to function with minimal pain  2  Increase strength B knees to 4+-5/5 in order to resume all pre-morbid activity  3   Increase walking endurance to 20-30 min for community ambulation  4  Increase FOTO score 15-20 points      Plan  Patient would benefit from: PT eval and skilled physical therapy  Referral necessary: No  Planned modality interventions: cryotherapy and thermotherapy: hydrocollator packs  Planned therapy interventions: manual therapy, strengthening, therapeutic activities, therapeutic exercise and home exercise program  Frequency: 2x week  Duration in visits: 20  Duration in weeks: 12  Plan of Care beginning date: 2021  Plan of Care expiration date: 2021        Subjective Evaluation    History of Present Illness  Date of onset: 5/15/2020  Mechanism of injury: Pt states she has hx R knee medial meniscus tear with arthroscopy 10 years ago  Reports painfree for many years and has had increasing pain for past year  Pt sates she has had several falls in past several months causing increased pain  X-rays reveal primary OA B knees  Pt referred for PT  Recurrent probem    Quality of life: poor    Pain  Current pain ratin  At best pain ratin  At worst pain rating: 10  Quality: radiating, knife-like and tight  Relieving factors: ice and rest  Aggravating factors: walking, stair climbing and standing  Progression: worsening    Social Support  Steps to enter house: yes  Stairs in house: yes   14  Lives in: multiple-level home  Lives with: spouse    Employment status: not working  Hand dominance: right      Diagnostic Tests  X-ray: abnormal  Treatments  Current treatment: physical therapy  Patient Goals  Patient goals for therapy: increased strength, decreased pain, increased motion, improved balance and independence with ADLs/IADLs  Patient goal: hurt less, strengthen        Objective     Tenderness   Left Knee   Tenderness in the ITB and medial joint line  Right Knee   Tenderness in the ITB and medial joint line       Neurological Testing     Sensation     Knee   Left Knee   Intact: light touch    Right Knee   Intact: light touch     Active Range of Motion   Left Hip   Normal active range of motion    Right Hip   Normal active range of motion  Left Knee   Flexion: 98 degrees   Extension: 0 degrees with pain    Right Knee   Flexion: 100 degrees   Extension: -5 degrees with pain    Additional Active Range of Motion Details  B squat 25%, HT walk intact but painful    Strength/Myotome Testing     Left Hip   Planes of Motion   Flexion: 4  Extension: 5  Abduction: 5    Right Hip   Planes of Motion   Flexion: 4-  Extension: 5  Abduction: 5    Left Knee   Flexion: 4  Extension: 4    Right Knee   Flexion: 4  Extension: 4    Additional Strength Details  B squat 25%, no uni squat L/R    Tests     Left Knee   Positive patellar compression and valgus stress test at 0 degrees  Right Knee   Positive patellar compression and valgus stress test at 0 degrees  Ambulation   Weight-Bearing Status   Weight-Bearing Status (Left): full weight bearing   Weight-Bearing Status (Right): full weight-bearing    Assistive device used: none    Ambulation: Level Surfaces   Ambulation with assistive device: independent    Ambulation: Stairs   Ascend stairs: independent  Pattern: non-reciprocal  Railings: one rail  Descend stairs: independent  Pattern: non-reciprocal  Railings: one rail    Observational Gait   Decreased walking speed and stride length       Comments   TUG 14sec,   5 x STS 21 sec      Flowsheet Rows      Most Recent Value   PT/OT G-Codes   Current Score  28   Projected Score  47   Assessment Type  Evaluation             Precautions: None      Manuals 5 12            Massage/STM medial kneespatellar mobs NV                                                   Neuro Re-Ed                                                                                                        Ther Ex             QS  SLR  SAQ 10x all L/R            LAQ/hip flx 10 all L/R            Sit to stand NV                                                                             Ther Activity HT raises  squats  vectors NV                                                                Modalities             prn

## 2021-05-19 ENCOUNTER — APPOINTMENT (OUTPATIENT)
Dept: PHYSICAL THERAPY | Facility: CLINIC | Age: 61
End: 2021-05-19
Payer: COMMERCIAL

## 2021-05-20 ENCOUNTER — APPOINTMENT (OUTPATIENT)
Dept: PHYSICAL THERAPY | Facility: CLINIC | Age: 61
End: 2021-05-20
Payer: COMMERCIAL

## 2021-05-26 ENCOUNTER — APPOINTMENT (OUTPATIENT)
Dept: PHYSICAL THERAPY | Facility: CLINIC | Age: 61
End: 2021-05-26
Payer: COMMERCIAL

## 2021-05-27 ENCOUNTER — APPOINTMENT (OUTPATIENT)
Dept: PHYSICAL THERAPY | Facility: CLINIC | Age: 61
End: 2021-05-27
Payer: COMMERCIAL

## 2021-06-15 DIAGNOSIS — E55.9 VITAMIN D DEFICIENCY: ICD-10-CM

## 2021-06-15 RX ORDER — MULTIVIT-MIN/IRON/FOLIC ACID/K 18-600-40
CAPSULE ORAL
Qty: 180 TABLET | Refills: 0 | Status: SHIPPED | OUTPATIENT
Start: 2021-06-15 | End: 2021-09-20

## 2021-07-06 ENCOUNTER — VBI (OUTPATIENT)
Dept: ADMINISTRATIVE | Facility: OTHER | Age: 61
End: 2021-07-06

## 2021-07-06 ENCOUNTER — APPOINTMENT (OUTPATIENT)
Dept: LAB | Facility: MEDICAL CENTER | Age: 61
End: 2021-07-06
Payer: COMMERCIAL

## 2021-07-06 DIAGNOSIS — M05.9 JUVENILE SEROPOSITIVE ARTHRITIS (HCC): ICD-10-CM

## 2021-07-06 LAB
ALBUMIN SERPL BCP-MCNC: 4 G/DL (ref 3.5–5)
ALP SERPL-CCNC: 137 U/L (ref 46–116)
ALT SERPL W P-5'-P-CCNC: 80 U/L (ref 12–78)
AST SERPL W P-5'-P-CCNC: 32 U/L (ref 5–45)
BASOPHILS # BLD AUTO: 0.08 THOUSANDS/ΜL (ref 0–0.1)
BASOPHILS NFR BLD AUTO: 1 % (ref 0–1)
BILIRUB DIRECT SERPL-MCNC: 0.33 MG/DL (ref 0–0.2)
BILIRUB SERPL-MCNC: 1.26 MG/DL (ref 0.2–1)
CREAT SERPL-MCNC: 0.74 MG/DL (ref 0.6–1.3)
CRP SERPL QL: <3 MG/L
EOSINOPHIL # BLD AUTO: 0.21 THOUSAND/ΜL (ref 0–0.61)
EOSINOPHIL NFR BLD AUTO: 2 % (ref 0–6)
ERYTHROCYTE [DISTWIDTH] IN BLOOD BY AUTOMATED COUNT: 12.3 % (ref 11.6–15.1)
ERYTHROCYTE [SEDIMENTATION RATE] IN BLOOD: 11 MM/HOUR (ref 0–29)
GFR SERPL CREATININE-BSD FRML MDRD: 88 ML/MIN/1.73SQ M
HCT VFR BLD AUTO: 44.9 % (ref 34.8–46.1)
HGB BLD-MCNC: 15.4 G/DL (ref 11.5–15.4)
IMM GRANULOCYTES # BLD AUTO: 0.02 THOUSAND/UL (ref 0–0.2)
IMM GRANULOCYTES NFR BLD AUTO: 0 % (ref 0–2)
LYMPHOCYTES # BLD AUTO: 3.06 THOUSANDS/ΜL (ref 0.6–4.47)
LYMPHOCYTES NFR BLD AUTO: 30 % (ref 14–44)
MCH RBC QN AUTO: 31.4 PG (ref 26.8–34.3)
MCHC RBC AUTO-ENTMCNC: 34.3 G/DL (ref 31.4–37.4)
MCV RBC AUTO: 91 FL (ref 82–98)
MONOCYTES # BLD AUTO: 0.65 THOUSAND/ΜL (ref 0.17–1.22)
MONOCYTES NFR BLD AUTO: 6 % (ref 4–12)
NEUTROPHILS # BLD AUTO: 6.06 THOUSANDS/ΜL (ref 1.85–7.62)
NEUTS SEG NFR BLD AUTO: 61 % (ref 43–75)
NRBC BLD AUTO-RTO: 0 /100 WBCS
PLATELET # BLD AUTO: 113 THOUSANDS/UL (ref 149–390)
PMV BLD AUTO: 13.5 FL (ref 8.9–12.7)
PROT SERPL-MCNC: 7.7 G/DL (ref 6.4–8.2)
RBC # BLD AUTO: 4.91 MILLION/UL (ref 3.81–5.12)
WBC # BLD AUTO: 10.08 THOUSAND/UL (ref 4.31–10.16)

## 2021-07-06 PROCEDURE — 86140 C-REACTIVE PROTEIN: CPT

## 2021-07-06 PROCEDURE — 36415 COLL VENOUS BLD VENIPUNCTURE: CPT

## 2021-07-06 PROCEDURE — 86200 CCP ANTIBODY: CPT

## 2021-07-06 PROCEDURE — 85652 RBC SED RATE AUTOMATED: CPT

## 2021-07-06 PROCEDURE — 80076 HEPATIC FUNCTION PANEL: CPT

## 2021-07-06 PROCEDURE — 82565 ASSAY OF CREATININE: CPT

## 2021-07-06 PROCEDURE — 86431 RHEUMATOID FACTOR QUANT: CPT

## 2021-07-06 PROCEDURE — 85025 COMPLETE CBC W/AUTO DIFF WBC: CPT

## 2021-07-06 PROCEDURE — 86430 RHEUMATOID FACTOR TEST QUAL: CPT

## 2021-07-07 LAB
CCP AB SER IA-ACNC: 304
CRYOGLOB RF SER-ACNC: ABNORMAL [IU]/ML
RHEUMATOID FACT SER QL LA: POSITIVE

## 2021-07-25 DIAGNOSIS — E11.9 TYPE 2 DIABETES MELLITUS WITHOUT COMPLICATION, WITHOUT LONG-TERM CURRENT USE OF INSULIN (HCC): ICD-10-CM

## 2021-07-25 RX ORDER — METFORMIN HYDROCHLORIDE 750 MG/1
TABLET, EXTENDED RELEASE ORAL
Qty: 90 TABLET | Refills: 0 | Status: SHIPPED | OUTPATIENT
Start: 2021-07-25 | End: 2021-10-25

## 2021-08-09 ENCOUNTER — VBI (OUTPATIENT)
Dept: ADMINISTRATIVE | Facility: OTHER | Age: 61
End: 2021-08-09

## 2021-09-10 ENCOUNTER — VBI (OUTPATIENT)
Dept: ADMINISTRATIVE | Facility: OTHER | Age: 61
End: 2021-09-10

## 2021-09-18 DIAGNOSIS — E55.9 VITAMIN D DEFICIENCY: ICD-10-CM

## 2021-09-20 RX ORDER — MULTIVIT-MIN/IRON/FOLIC ACID/K 18-600-40
CAPSULE ORAL
Qty: 180 TABLET | Refills: 0 | Status: SHIPPED | OUTPATIENT
Start: 2021-09-20 | End: 2021-12-21

## 2021-09-27 ENCOUNTER — RA CDI HCC (OUTPATIENT)
Dept: OTHER | Facility: HOSPITAL | Age: 61
End: 2021-09-27

## 2021-10-01 ENCOUNTER — VBI (OUTPATIENT)
Dept: ADMINISTRATIVE | Facility: OTHER | Age: 61
End: 2021-10-01

## 2021-10-23 DIAGNOSIS — E11.9 TYPE 2 DIABETES MELLITUS WITHOUT COMPLICATION, WITHOUT LONG-TERM CURRENT USE OF INSULIN (HCC): ICD-10-CM

## 2021-10-25 RX ORDER — METFORMIN HYDROCHLORIDE 750 MG/1
TABLET, EXTENDED RELEASE ORAL
Qty: 90 TABLET | Refills: 0 | Status: SHIPPED | OUTPATIENT
Start: 2021-10-25 | End: 2022-01-24

## 2021-11-15 ENCOUNTER — VBI (OUTPATIENT)
Dept: ADMINISTRATIVE | Facility: OTHER | Age: 61
End: 2021-11-15

## 2021-12-03 ENCOUNTER — RA CDI HCC (OUTPATIENT)
Dept: OTHER | Facility: HOSPITAL | Age: 61
End: 2021-12-03

## 2021-12-14 ENCOUNTER — VBI (OUTPATIENT)
Dept: ADMINISTRATIVE | Facility: OTHER | Age: 61
End: 2021-12-14

## 2021-12-20 DIAGNOSIS — E55.9 VITAMIN D DEFICIENCY: ICD-10-CM

## 2021-12-21 RX ORDER — MULTIVIT-MIN/IRON/FOLIC ACID/K 18-600-40
CAPSULE ORAL
Qty: 180 TABLET | Refills: 0 | Status: SHIPPED | OUTPATIENT
Start: 2021-12-21 | End: 2022-03-24

## 2021-12-28 ENCOUNTER — TELEPHONE (OUTPATIENT)
Dept: INTERNAL MEDICINE CLINIC | Facility: CLINIC | Age: 61
End: 2021-12-28

## 2021-12-28 DIAGNOSIS — R09.89 RUNNY NOSE: Primary | ICD-10-CM

## 2021-12-28 DIAGNOSIS — R06.02 SHORTNESS OF BREATH: ICD-10-CM

## 2021-12-28 DIAGNOSIS — R51.9 HEADACHE: ICD-10-CM

## 2021-12-28 PROCEDURE — 87636 SARSCOV2 & INF A&B AMP PRB: CPT | Performed by: INTERNAL MEDICINE

## 2022-01-06 ENCOUNTER — RA CDI HCC (OUTPATIENT)
Dept: OTHER | Facility: HOSPITAL | Age: 62
End: 2022-01-06

## 2022-01-06 NOTE — PROGRESS NOTES
The following dx found on active problem list - please assess using MEAT for  billing    Diabetes ([facility] ) [E11 9]    Recurrent major depression in partial remission ([place] [address] ) [F33 41]    Morbid obesity ([place] [address] ) [E66 01]    Rheumatoid arthritis involving multiple sites with positive rheumatoid factor ([place] [address] ) [M05 79]    [place] [address]  coding opportunities          Number of diagnosis code(s) already on the problem list added to FYI fla                     Patients insurance company: [employer])             [place] [address]  coding opportunities          Number of diagnosis code(s) already on the problem list added to [employer] fla                  Number of suggestions NOT actually used: 4     Patients insurance company: [employer] ([employer]     Visit status: Patient canceled the appointment

## 2022-01-24 DIAGNOSIS — E11.9 TYPE 2 DIABETES MELLITUS WITHOUT COMPLICATION, WITHOUT LONG-TERM CURRENT USE OF INSULIN (HCC): ICD-10-CM

## 2022-01-24 RX ORDER — METFORMIN HYDROCHLORIDE 750 MG/1
750 TABLET, EXTENDED RELEASE ORAL
Qty: 90 TABLET | Refills: 3 | Status: SHIPPED | OUTPATIENT
Start: 2022-01-24 | End: 2022-01-28

## 2022-01-24 RX ORDER — METFORMIN HYDROCHLORIDE 750 MG/1
TABLET, EXTENDED RELEASE ORAL
Qty: 90 TABLET | Refills: 0 | Status: SHIPPED | OUTPATIENT
Start: 2022-01-24 | End: 2022-01-24

## 2022-01-25 ENCOUNTER — OFFICE VISIT (OUTPATIENT)
Dept: INTERNAL MEDICINE CLINIC | Facility: CLINIC | Age: 62
End: 2022-01-25
Payer: COMMERCIAL

## 2022-01-25 VITALS
HEIGHT: 60 IN | HEART RATE: 72 BPM | SYSTOLIC BLOOD PRESSURE: 126 MMHG | RESPIRATION RATE: 16 BRPM | WEIGHT: 263 LBS | DIASTOLIC BLOOD PRESSURE: 84 MMHG | BODY MASS INDEX: 51.63 KG/M2 | OXYGEN SATURATION: 99 %

## 2022-01-25 DIAGNOSIS — Z12.11 SCREENING FOR COLON CANCER: ICD-10-CM

## 2022-01-25 DIAGNOSIS — E55.9 VITAMIN D DEFICIENCY: ICD-10-CM

## 2022-01-25 DIAGNOSIS — M79.7 FIBROMYALGIA: ICD-10-CM

## 2022-01-25 DIAGNOSIS — K75.81 NASH (NONALCOHOLIC STEATOHEPATITIS): ICD-10-CM

## 2022-01-25 DIAGNOSIS — E53.8 VITAMIN B12 DEFICIENCY: ICD-10-CM

## 2022-01-25 DIAGNOSIS — B35.1 ONYCHOMYCOSIS: ICD-10-CM

## 2022-01-25 DIAGNOSIS — E11.9 TYPE 2 DIABETES MELLITUS WITHOUT COMPLICATION, WITHOUT LONG-TERM CURRENT USE OF INSULIN (HCC): Primary | ICD-10-CM

## 2022-01-25 DIAGNOSIS — E53.8 FOLATE DEFICIENCY: ICD-10-CM

## 2022-01-25 DIAGNOSIS — M05.79 RHEUMATOID ARTHRITIS INVOLVING MULTIPLE SITES WITH POSITIVE RHEUMATOID FACTOR (HCC): ICD-10-CM

## 2022-01-25 DIAGNOSIS — E78.01 FAMILIAL HYPERCHOLESTEROLEMIA: ICD-10-CM

## 2022-01-25 DIAGNOSIS — Z12.31 SCREENING MAMMOGRAM FOR BREAST CANCER: ICD-10-CM

## 2022-01-25 LAB
CREAT UR-MCNC: 109 MG/DL
MICROALBUMIN UR-MCNC: 21.8 MG/L (ref 0–20)
MICROALBUMIN/CREAT 24H UR: 20 MG/G CREATININE (ref 0–30)

## 2022-01-25 PROCEDURE — 3008F BODY MASS INDEX DOCD: CPT | Performed by: INTERNAL MEDICINE

## 2022-01-25 PROCEDURE — 3079F DIAST BP 80-89 MM HG: CPT | Performed by: INTERNAL MEDICINE

## 2022-01-25 PROCEDURE — 82043 UR ALBUMIN QUANTITATIVE: CPT | Performed by: INTERNAL MEDICINE

## 2022-01-25 PROCEDURE — 99214 OFFICE O/P EST MOD 30 MIN: CPT | Performed by: INTERNAL MEDICINE

## 2022-01-25 PROCEDURE — 3074F SYST BP LT 130 MM HG: CPT | Performed by: INTERNAL MEDICINE

## 2022-01-25 PROCEDURE — 82570 ASSAY OF URINE CREATININE: CPT | Performed by: INTERNAL MEDICINE

## 2022-01-25 PROCEDURE — 1036F TOBACCO NON-USER: CPT | Performed by: INTERNAL MEDICINE

## 2022-01-25 PROCEDURE — 3061F NEG MICROALBUMINURIA REV: CPT | Performed by: INTERNAL MEDICINE

## 2022-01-25 RX ORDER — MELOXICAM 15 MG/1
15 TABLET ORAL DAILY PRN
Qty: 90 TABLET | Refills: 1 | Status: SHIPPED | OUTPATIENT
Start: 2022-01-25

## 2022-01-25 NOTE — PROGRESS NOTES
Assessment/Plan:    #Fall  -slipped on ice  -injured knee and buttocks b/l  -is improving    #Rheumatoid Arthritis  -seeing Dr Damian Mabry  -now on methotrexate, remicaide every 6-8 weeks infusion, plaquinil, prednisone  -also has fibromyalgia  -will refer to medical marijuana  -trend CRP, ESR, B12, folate    #DM  -remains on metformin  -awaiting a1c  -due for ophthalmology    #Onychomycosis  -noted on left big toe  -start on penlac    #HLD  -awaiting lipids  -continue atorvastatin    #LERMA  -encouraged diet and exercise  -defers repeat US liver for now due to insurance costs    #Depression  -not an issue  -previously on ECT, lithium, wellbutrin, amitryptylline, xanax    #Health Maintenance  -routine labs and followup 6 months  -covid booster pending  -defers flu vaccine  -cologuard, mammogram pending  -WILL NEED TO ADDRESS GYN VISIT NEXT TIME    No problem-specific Assessment & Plan notes found for this encounter  Diagnoses and all orders for this visit:    Type 2 diabetes mellitus without complication, without long-term current use of insulin (HCC)  -     Microalbumin / creatinine urine ratio  -     Ambulatory Referral to Ophthalmology; Future  -     CBC and differential  -     Hemoglobin A1C  -     Comprehensive metabolic panel    Rheumatoid arthritis involving multiple sites with positive rheumatoid factor (HCC)  -     meloxicam (MOBIC) 15 mg tablet; Take 1 tablet (15 mg total) by mouth daily as needed for mild pain  -     CBC and differential  -     Comprehensive metabolic panel  -     C-reactive protein  -     Sedimentation rate, automated  -     Ambulatory Referral to Gynecology;  Future    Familial hypercholesterolemia  -     CBC and differential  -     Comprehensive metabolic panel  -     Lipid Panel With Direct LDL    LERMA (nonalcoholic steatohepatitis)  -     CBC and differential  -     Comprehensive metabolic panel    Onychomycosis  -     CBC and differential  -     Comprehensive metabolic panel  - ciclopirox (PENLAC) 8 % solution; Apply topically daily at bedtime    Vitamin D deficiency  -     CBC and differential  -     Comprehensive metabolic panel  -     Vitamin D 25 hydroxy    Vitamin B12 deficiency  -     CBC and differential  -     Comprehensive metabolic panel  -     Vitamin B12    Folate deficiency  -     CBC and differential  -     Comprehensive metabolic panel  -     Folate    Screening mammogram for breast cancer  -     Mammo screening bilateral w 3d & cad; Future    Screening for colon cancer  -     Cologuard    Fibromyalgia  -     Ambulatory Referral to Gynecology;  Future            Current Outpatient Medications:     atorvastatin (LIPITOR) 20 mg tablet, Take 1 tablet by mouth once daily, Disp: 90 tablet, Rfl: 2    ciclopirox (PENLAC) 8 % solution, Apply topically daily at bedtime, Disp: 6 6 mL, Rfl: 3    diclofenac (VOLTAREN) 75 mg EC tablet, Take 1 tablet (75 mg total) by mouth 2 (two) times a day PRN, Disp: 60 tablet, Rfl: 1    ergocalciferol (VITAMIN D2) 50,000 units, Take 1 capsule (50,000 Units total) by mouth every 28 days for 3 days, Disp: 3 capsule, Rfl: 0    folic acid (FOLVITE) 1 mg tablet, Take 1,000 mcg by mouth daily, Disp: , Rfl:     hydroxychloroquine (PLAQUENIL) 200 mg tablet, Take 200 mg by mouth 2 (two) times a day with meals, Disp: , Rfl:     meloxicam (MOBIC) 15 mg tablet, Take 1 tablet (15 mg total) by mouth daily as needed for mild pain, Disp: 90 tablet, Rfl: 1    metFORMIN (GLUCOPHAGE-XR) 750 mg 24 hr tablet, Take 1 tablet (750 mg total) by mouth daily with breakfast, Disp: 90 tablet, Rfl: 3    methotrexate 2 5 MG tablet, TAKE 4 TABLETS BY MOUTH EVERY FRIDAY, Disp: , Rfl:     oxyCODONE-acetaminophen (PERCOCET) 5-325 mg per tablet, Take 1 tablet by mouth every 6 (six) hours as needed for moderate painMax Daily Amount: 4 tablets, Disp: 30 tablet, Rfl: 0    pantoprazole (PROTONIX) 40 mg tablet, Take 1 tablet (40 mg total) by mouth 2 (two) times a day, Disp: 28 tablet, Rfl: 3    predniSONE 5 mg tablet, , Disp: , Rfl:     traMADol (ULTRAM) 50 mg tablet, Take 1 tablet (50 mg total) by mouth every 8 (eight) hours as needed for severe pain, Disp: 60 tablet, Rfl: 0    Vitamin D, Cholecalciferol, 25 MCG (1000 UT) TABS, Take 2 tablets by mouth once daily, Disp: 180 tablet, Rfl: 0    Subjective:      Patient ID: Irish Garcia is a 62 y o  female  HPI     Patient presents for routine checkup  She has rheumatoid arthritis as well as fibromyalgia  She is currently taking Remicade as well as methotrexate, Plaquenil, prednisone  She remains symptomatic however  She is seeing Rheumatology  We encouraged her to follow up with them  Recently patient reports that she fell down onto her knee and buttocks when she slipped on ice  She denies any loss in consciousness but has mild abrasions to her hands as well as her knee area  She states that the pain is much improved and she is icing it  She has diabetes and remains on metformin  We will await her A1c  Diabetic foot exam today reveals onychomycoses over the left big toe  We will start her on Penlac solution  She is also due to follow up with Ophthalmology for annual eye exam   She is due for ECHO low card colon cancer screening as well as a mammogram and we will provide her with the referrals for that  She reports that her insurance has a high deductible plan and she is deferring on ultrasound of the liver at this time  Patient is currently taking atorvastatin for hyperlipidemia  We will await her lipids  She will return to care in 6 months  The following portions of the patient's history were reviewed and updated as appropriate: allergies, current medications, past family history, past medical history, past social history, past surgical history and problem list     Review of Systems   Constitutional: Negative for activity change, appetite change, chills, fatigue and fever     HENT: Negative for congestion, ear pain, facial swelling, hearing loss, sore throat, tinnitus and trouble swallowing  Eyes: Negative for photophobia and visual disturbance  Respiratory: Negative for cough, shortness of breath and wheezing  Cardiovascular: Negative for chest pain, palpitations and leg swelling  Gastrointestinal: Negative for abdominal distention, abdominal pain, blood in stool, constipation, diarrhea, nausea and vomiting  Genitourinary: Negative for difficulty urinating, dysuria and pelvic pain  Musculoskeletal: Positive for arthralgias (in knees and buttock) and myalgias (all over)  Negative for back pain, gait problem, joint swelling, neck pain and neck stiffness  Skin: Negative for rash and wound  Neurological: Negative for dizziness, tremors, light-headedness and headaches  Objective:      /84   Pulse 72   Resp 16   Ht 5' (1 524 m)   Wt 119 kg (263 lb)   SpO2 99%   BMI 51 36 kg/m²          Physical Exam  Vitals reviewed  Constitutional:       Appearance: Normal appearance  She is well-developed  She is obese  HENT:      Head: Normocephalic and atraumatic  Right Ear: External ear normal       Left Ear: External ear normal       Nose: Nose normal    Eyes:      Conjunctiva/sclera: Conjunctivae normal       Pupils: Pupils are equal, round, and reactive to light  Neck:      Thyroid: No thyromegaly  Vascular: No JVD  Cardiovascular:      Rate and Rhythm: Normal rate and regular rhythm  Pulses: Normal pulses  Heart sounds: Normal heart sounds  No murmur heard  Pulmonary:      Effort: Pulmonary effort is normal  No respiratory distress  Breath sounds: Normal breath sounds  No stridor  No wheezing, rhonchi or rales  Abdominal:      General: Bowel sounds are normal  There is no distension  Palpations: Abdomen is soft  There is no mass  Tenderness: There is no abdominal tenderness  There is no guarding or rebound     Musculoskeletal:         General: Tenderness (diffuse) and signs of injury (b/l knees and buttocks) present  Normal range of motion  Cervical back: Normal range of motion and neck supple  Right lower leg: No edema  Left lower leg: No edema  Lymphadenopathy:      Cervical: No cervical adenopathy  Skin:     General: Skin is warm  Findings: No erythema or rash  Neurological:      Mental Status: She is alert and oriented to person, place, and time  Mental status is at baseline  Motor: No weakness  Gait: Gait normal       Deep Tendon Reflexes: Reflexes are normal and symmetric  This note was completed in part utilizing [employer] direct voice recognition software  Grammatical errors, random word insertion, spelling mistakes, and incomplete sentences may be an occasional consequence of the system secondary to software limitations, ambient noise and hardware issues  At the time of dictation, efforts were made to edit, clarify and /or correct errors  Please read the chart carefully and recognize, using context, where substitutions have occurred  If you have any questions or concerns about the context, text or information contained within the body of this dictation, please contact myself, the provider, for further clarification

## 2022-01-25 NOTE — PROGRESS NOTES
Diabetic Foot Exam    Patient's shoes and socks removed  Right Foot/Ankle   Right Foot Inspection  Skin Exam: skin normal  Skin not intact, no dry skin, no warmth, no callus, no erythema, no maceration, no abnormal color, no pre-ulcer, no ulcer and no callus  Toe Exam: ROM and strength within normal limits  No swelling, no tenderness, erythema and  no right toe deformity    Sensory   Vibration: intact  Proprioception: intact  Monofilament testing: intact    Vascular  Capillary refills: < 3 seconds  The right DP pulse is 1+  The right PT pulse is 1+  Right Toe  - Comprehensive Exam  Ecchymosis: none  Arch: normal  Hammertoes: absent  Claw Toes: absent  Swelling: none   Tenderness: none         Left Foot/Ankle  Left Foot Inspection  Skin Exam: skin normal  Skin not intact, no dry skin, no warmth, no erythema, no maceration, normal color, no pre-ulcer, no ulcer and no callus  Toe Exam: ROM and strength within normal limits  No swelling, no tenderness, no erythema and no left toe deformity  Sensory   Vibration: intact  Proprioception: intact  Monofilament testing: intact    Vascular  Capillary refills: < 3 seconds  The left DP pulse is 1+  The left PT pulse is 1+       Left Toe  - Comprehensive Exam  Ecchymosis: none  Arch: normal  Hammertoes: absent  Claw toes: absent  Swelling: none   Tenderness: none           Assign Risk Category  No deformity present  No loss of protective sensation  No weak pulses  Risk: 0

## 2022-02-23 ENCOUNTER — VBI (OUTPATIENT)
Dept: ADMINISTRATIVE | Facility: OTHER | Age: 62
End: 2022-02-23

## 2022-03-03 LAB — COLOGUARD RESULT REPORTABLE: NEGATIVE

## 2022-03-24 DIAGNOSIS — E55.9 VITAMIN D DEFICIENCY: ICD-10-CM

## 2022-03-24 RX ORDER — MULTIVIT-MIN/IRON/FOLIC ACID/K 18-600-40
CAPSULE ORAL
Qty: 180 TABLET | Refills: 0 | Status: SHIPPED | OUTPATIENT
Start: 2022-03-24

## 2022-04-14 ENCOUNTER — TELEPHONE (OUTPATIENT)
Dept: INTERNAL MEDICINE CLINIC | Facility: CLINIC | Age: 62
End: 2022-04-14

## 2022-04-14 NOTE — TELEPHONE ENCOUNTER
It has been sent in, also she is mistakenly put onto Dr Muriel Sheriff schedule for a followup on 8/4/22  Please change this

## 2022-04-14 NOTE — TELEPHONE ENCOUNTER
Would she be interested in resuming tramadol, a low dose opiate?  She was on it in the past  Also her arthritis may be flaring up and she should notify Dr Justus Bauer because he may need to change her prednisone or other medication

## 2022-04-14 NOTE — TELEPHONE ENCOUNTER
She will try the tramadol again  She has rheumatology appt in 2 weeks  Thank you  You can send to [facility]

## 2022-04-15 NOTE — TELEPHONE ENCOUNTER
I will work on this to move her appt        [facility] pharmacy just called about the tramadol and asked if this is acute or chronic and for the dx      Will call the pharmacist back   [phone] [facility]

## 2022-04-27 ENCOUNTER — APPOINTMENT (OUTPATIENT)
Dept: LAB | Facility: MEDICAL CENTER | Age: 62
End: 2022-04-27
Payer: COMMERCIAL

## 2022-04-27 DIAGNOSIS — M05.9 JUVENILE SEROPOSITIVE ARTHRITIS (HCC): ICD-10-CM

## 2022-04-27 LAB
ALBUMIN SERPL BCP-MCNC: 3.9 G/DL (ref 3.5–5)
ALP SERPL-CCNC: 164 U/L (ref 46–116)
ALT SERPL W P-5'-P-CCNC: 80 U/L (ref 12–78)
AST SERPL W P-5'-P-CCNC: 43 U/L (ref 5–45)
BILIRUB DIRECT SERPL-MCNC: 0.27 MG/DL (ref 0–0.2)
BILIRUB SERPL-MCNC: 0.95 MG/DL (ref 0.2–1)
CREAT SERPL-MCNC: 0.69 MG/DL (ref 0.6–1.3)
CRP SERPL QL: <3 MG/L
ERYTHROCYTE [DISTWIDTH] IN BLOOD BY AUTOMATED COUNT: 13.1 % (ref 11.6–15.1)
ERYTHROCYTE [SEDIMENTATION RATE] IN BLOOD: 20 MM/HOUR (ref 0–29)
GFR SERPL CREATININE-BSD FRML MDRD: 94 ML/MIN/1.73SQ M
HCT VFR BLD AUTO: 40.5 % (ref 34.8–46.1)
HGB BLD-MCNC: 13.3 G/DL (ref 11.5–15.4)
MCH RBC QN AUTO: 30.7 PG (ref 26.8–34.3)
MCHC RBC AUTO-ENTMCNC: 32.8 G/DL (ref 31.4–37.4)
MCV RBC AUTO: 94 FL (ref 82–98)
PLATELET # BLD AUTO: 106 THOUSANDS/UL (ref 149–390)
PMV BLD AUTO: 14 FL (ref 8.9–12.7)
PROT SERPL-MCNC: 7.2 G/DL (ref 6.4–8.2)
RBC # BLD AUTO: 4.33 MILLION/UL (ref 3.81–5.12)
WBC # BLD AUTO: 7.78 THOUSAND/UL (ref 4.31–10.16)

## 2022-04-27 PROCEDURE — 82565 ASSAY OF CREATININE: CPT

## 2022-04-27 PROCEDURE — 85027 COMPLETE CBC AUTOMATED: CPT

## 2022-04-27 PROCEDURE — 36415 COLL VENOUS BLD VENIPUNCTURE: CPT

## 2022-04-27 PROCEDURE — 86140 C-REACTIVE PROTEIN: CPT

## 2022-04-27 PROCEDURE — 85652 RBC SED RATE AUTOMATED: CPT

## 2022-04-27 PROCEDURE — 80076 HEPATIC FUNCTION PANEL: CPT

## 2022-04-29 DIAGNOSIS — E11.9 TYPE 2 DIABETES MELLITUS WITHOUT COMPLICATION, WITHOUT LONG-TERM CURRENT USE OF INSULIN (HCC): ICD-10-CM

## 2022-04-29 DIAGNOSIS — E78.01 FAMILIAL HYPERCHOLESTEROLEMIA: ICD-10-CM

## 2022-04-29 RX ORDER — ATORVASTATIN CALCIUM 20 MG/1
20 TABLET, FILM COATED ORAL DAILY
Qty: 90 TABLET | Refills: 2 | Status: SHIPPED | OUTPATIENT
Start: 2022-04-29

## 2022-04-29 RX ORDER — METFORMIN HYDROCHLORIDE 750 MG/1
750 TABLET, EXTENDED RELEASE ORAL
Qty: 90 TABLET | Refills: 1 | Status: SHIPPED | OUTPATIENT
Start: 2022-04-29

## 2022-05-11 ENCOUNTER — VBI (OUTPATIENT)
Dept: ADMINISTRATIVE | Facility: OTHER | Age: 62
End: 2022-05-11

## 2022-06-03 ENCOUNTER — TELEPHONE (OUTPATIENT)
Dept: INTERNAL MEDICINE CLINIC | Facility: CLINIC | Age: 62
End: 2022-06-03

## 2022-06-03 NOTE — TELEPHONE ENCOUNTER
Patient called and stating is going on a bus ride with friends next month or week   Should she wear a mask ? She wants your opinion     I told her its personal preference but she wants your say

## 2022-06-08 ENCOUNTER — TELEMEDICINE (OUTPATIENT)
Dept: INTERNAL MEDICINE CLINIC | Facility: CLINIC | Age: 62
End: 2022-06-08
Payer: COMMERCIAL

## 2022-06-08 DIAGNOSIS — U07.1 COVID-19: Primary | ICD-10-CM

## 2022-06-08 PROCEDURE — 99212 OFFICE O/P EST SF 10 MIN: CPT | Performed by: INTERNAL MEDICINE

## 2022-06-08 RX ORDER — LEUCOVORIN CALCIUM 10 MG/1
TABLET ORAL
COMMUNITY
Start: 2022-03-25

## 2022-06-22 ENCOUNTER — VBI (OUTPATIENT)
Dept: ADMINISTRATIVE | Facility: OTHER | Age: 62
End: 2022-06-22

## 2022-08-09 ENCOUNTER — VBI (OUTPATIENT)
Dept: ADMINISTRATIVE | Facility: OTHER | Age: 62
End: 2022-08-09

## 2022-08-31 ENCOUNTER — VBI (OUTPATIENT)
Dept: ADMINISTRATIVE | Facility: OTHER | Age: 62
End: 2022-08-31

## 2022-09-30 ENCOUNTER — APPOINTMENT (OUTPATIENT)
Dept: LAB | Facility: MEDICAL CENTER | Age: 62
End: 2022-09-30
Payer: COMMERCIAL

## 2022-09-30 DIAGNOSIS — M05.9 JUVENILE SEROPOSITIVE ARTHRITIS (HCC): ICD-10-CM

## 2022-09-30 LAB
ALBUMIN SERPL BCP-MCNC: 3.6 G/DL (ref 3.5–5)
ALP SERPL-CCNC: 206 U/L (ref 46–116)
ALT SERPL W P-5'-P-CCNC: 71 U/L (ref 12–78)
AST SERPL W P-5'-P-CCNC: 42 U/L (ref 5–45)
BASOPHILS # BLD AUTO: 0.05 THOUSANDS/ΜL (ref 0–0.1)
BASOPHILS NFR BLD AUTO: 1 % (ref 0–1)
BILIRUB DIRECT SERPL-MCNC: 0.29 MG/DL (ref 0–0.2)
BILIRUB SERPL-MCNC: 0.76 MG/DL (ref 0.2–1)
CREAT SERPL-MCNC: 0.85 MG/DL (ref 0.6–1.3)
CRP SERPL QL: <3 MG/L
EOSINOPHIL # BLD AUTO: 0.2 THOUSAND/ΜL (ref 0–0.61)
EOSINOPHIL NFR BLD AUTO: 3 % (ref 0–6)
ERYTHROCYTE [DISTWIDTH] IN BLOOD BY AUTOMATED COUNT: 12.9 % (ref 11.6–15.1)
ERYTHROCYTE [SEDIMENTATION RATE] IN BLOOD: 17 MM/HOUR (ref 0–29)
GFR SERPL CREATININE-BSD FRML MDRD: 74 ML/MIN/1.73SQ M
HCT VFR BLD AUTO: 39.5 % (ref 34.8–46.1)
HGB BLD-MCNC: 13.3 G/DL (ref 11.5–15.4)
IMM GRANULOCYTES # BLD AUTO: 0.02 THOUSAND/UL (ref 0–0.2)
IMM GRANULOCYTES NFR BLD AUTO: 0 % (ref 0–2)
LYMPHOCYTES # BLD AUTO: 1.17 THOUSANDS/ΜL (ref 0.6–4.47)
LYMPHOCYTES NFR BLD AUTO: 17 % (ref 14–44)
MCH RBC QN AUTO: 31.4 PG (ref 26.8–34.3)
MCHC RBC AUTO-ENTMCNC: 33.7 G/DL (ref 31.4–37.4)
MCV RBC AUTO: 93 FL (ref 82–98)
MONOCYTES # BLD AUTO: 0.63 THOUSAND/ΜL (ref 0.17–1.22)
MONOCYTES NFR BLD AUTO: 9 % (ref 4–12)
NEUTROPHILS # BLD AUTO: 4.86 THOUSANDS/ΜL (ref 1.85–7.62)
NEUTS SEG NFR BLD AUTO: 70 % (ref 43–75)
NRBC BLD AUTO-RTO: 0 /100 WBCS
PLATELET # BLD AUTO: 89 THOUSANDS/UL (ref 149–390)
PMV BLD AUTO: 14.5 FL (ref 8.9–12.7)
PROT SERPL-MCNC: 7.2 G/DL (ref 6.4–8.4)
RBC # BLD AUTO: 4.24 MILLION/UL (ref 3.81–5.12)
WBC # BLD AUTO: 6.93 THOUSAND/UL (ref 4.31–10.16)

## 2022-09-30 PROCEDURE — 36415 COLL VENOUS BLD VENIPUNCTURE: CPT

## 2022-09-30 PROCEDURE — 80076 HEPATIC FUNCTION PANEL: CPT

## 2022-09-30 PROCEDURE — 85025 COMPLETE CBC W/AUTO DIFF WBC: CPT

## 2022-09-30 PROCEDURE — 85652 RBC SED RATE AUTOMATED: CPT

## 2022-09-30 PROCEDURE — 82565 ASSAY OF CREATININE: CPT

## 2022-09-30 PROCEDURE — 86140 C-REACTIVE PROTEIN: CPT

## 2022-10-12 DIAGNOSIS — E55.9 VITAMIN D DEFICIENCY: ICD-10-CM

## 2022-10-12 RX ORDER — MULTIVIT-MIN/IRON/FOLIC ACID/K 18-600-40
CAPSULE ORAL
Qty: 180 TABLET | Refills: 0 | Status: SHIPPED | OUTPATIENT
Start: 2022-10-12

## 2022-11-01 DIAGNOSIS — E11.9 TYPE 2 DIABETES MELLITUS WITHOUT COMPLICATION, WITHOUT LONG-TERM CURRENT USE OF INSULIN (HCC): ICD-10-CM

## 2022-11-01 RX ORDER — METFORMIN HYDROCHLORIDE 750 MG/1
TABLET, EXTENDED RELEASE ORAL
Qty: 90 TABLET | Refills: 0 | Status: SHIPPED | OUTPATIENT
Start: 2022-11-01

## 2022-11-08 ENCOUNTER — VBI (OUTPATIENT)
Dept: ADMINISTRATIVE | Facility: OTHER | Age: 62
End: 2022-11-08

## 2022-11-10 NOTE — PROGRESS NOTES
COVID-19 Outpatient Progress Note    Assessment/Plan:    Problem List Items Addressed This Visit    None     Visit Diagnoses     COVID-19    -  Primary    Relevant Medications    nirmatrelvir & ritonavir (Paxlovid) tablet therapy pack        Yesterday in AM symptoms started and had headache  Runny nose  Thought it was seasonal allergies and did a home covid test that was positive  Feeling warm  Denies SOB  Loss in taste but smell is altered  Not taking anything for relief  Is quarantined  Did not come across anyone who was sick  Discussed paxlovid  Therapy and she she will start on it due to immunocompromised status  She will hold atorvastatin while on the medication  Disposition:     Patient meets criteria for PAXLOVID and they have been counseled appropriately according to EUA documentation released by the FDA  After discussion, patient agrees to treatment  Rita Rojas is an investigational medicine used to treat mild-to-moderate COVID-19 in adults and children (15years of age and older weighing at least 80 pounds (40 kg)) with positive results of direct SARS-CoV-2 viral testing, and who are at high risk for progression to severe COVID-19, including hospitalization or death  PAXLOVID is investigational because it is still being studied  There is limited information about the safety and effectiveness of using PAXLOVID to treat people with mild-to-moderate COVID-19  The FDA has authorized the emergency use of PAXLOVID for the treatment of mild-tomoderate COVID-19 in adults and children (15years of age and older weighing at least 80 pounds (40 kg)) with a positive test for the virus that causes COVID-19, and who are at high risk for progression to severe COVID-19, including hospitalization or death, under an EUA  What should I tell my healthcare provider before I take PAXLOVID?     Tell your healthcare provider if you:  - Have any allergies  - Have liver or kidney disease  - Are pregnant or plan to become Subjective   Patient ID: Reggie is a 11 year old female.    Chief Complaint   Patient presents with   • Sore Throat     X 6 days   • Cough   • Fever     This is a 11-year-old female presenting with 6-day history of cough and congestion as well as mild sore throat.  Fevers have been on and off for the past 6 days as well.  Mom states they have been tactile fevers not documented.  Sisters are sick at home with similar symptoms.  Denies any shortness of breath, wheezing, chest pain.  Has been using ibuprofen over-the-counter with some relief.        No past medical history on file.    MEDICATIONS:  Current Outpatient Medications   Medication Sig   • albuterol 108 (90 Base) MCG/ACT inhaler Inhale 2 puffs into the lungs every 4 hours as needed for Shortness of Breath or Wheezing.   • fluticasone (FLONASE) 50 MCG/ACT nasal spray 1-2 sprays in each nare daily.     No current facility-administered medications for this visit.       ALLERGIES:  ALLERGIES:  No Known Allergies    PAST SURGICAL HISTORY:  No past surgical history on file.    FAMILY HISTORY:  No family history on file.    SOCIAL HISTORY:           Review of Systems   All other systems reviewed and are negative.      Objective   Physical Exam  Constitutional:       General: She is active.      Appearance: Normal appearance. She is well-developed and normal weight.   HENT:      Head: Normocephalic and atraumatic.      Right Ear: Tympanic membrane and ear canal normal. There is no impacted cerumen. Tympanic membrane is not erythematous or bulging.      Left Ear: Tympanic membrane and ear canal normal. There is no impacted cerumen. Tympanic membrane is not erythematous or bulging.      Nose: Nose normal.      Mouth/Throat:      Mouth: Mucous membranes are moist.      Pharynx: Oropharynx is clear.      Neck: Normal range of motion.   Cardiovascular:      Rate and Rhythm: Normal rate and regular rhythm.      Heart sounds: Normal heart sounds.   Pulmonary:      Effort:  Pulmonary effort is normal. No respiratory distress, nasal flaring or retractions.      Breath sounds: Normal breath sounds. No stridor or decreased air movement. No wheezing, rhonchi or rales.   Musculoskeletal:         General: No swelling or tenderness. Normal range of motion.   Skin:     Coloration: Skin is not cyanotic.      Findings: No erythema or rash.   Neurological:      General: No focal deficit present.      Mental Status: She is alert.   Psychiatric:         Mood and Affect: Mood normal.       Visit Vitals  BP (!) 127/68   Pulse 92   Temp 97.5 °F (36.4 °C)   Resp (!) 16   Wt (!) 79 kg (174 lb 2.6 oz)   SpO2 99%             Labs:  Results for orders placed or performed in visit on 11/10/22   POCT SARS-COV-2 ANTIGEN   Result Value Ref Range    POCT SARS-COV-2 ANTIGEN Not Detected Not Detected    Internal Procedural Controls Acceptable Yes    POCT INFLUENZA A/B   Result Value Ref Range    Rapid Influenza A Ag Positive (A) Negative, Indeterminate    Rapid Influenza B Ag Negative Negative, Indeterminate    Internal Procedural Controls Acceptable Yes    POCT RAPID STREP A   Result Value Ref Range    GRP A STREP Negative Negative    Internal Procedural Controls Acceptable Yes          Imaging:  No image results found.        MDM  This is a 11-year-old female presenting with 6-day history of cough and congestion as well as mild sore throat.  Fevers have been on and off for the past 6 days as well.  Mom states they have been tactile fevers not documented.  Sisters are sick at home with similar symptoms.  Denies any shortness of breath, wheezing, chest pain.  Has been using ibuprofen over-the-counter with some relief.    Rest review of systems are negative.  Vital signs are stable.  Pulse ox 9 9% on room air within normal limits.    Physical exam heart and lung sounds are within normal limits.  There are no wheezes rhonchi or rales.  Oropharynx is clear.  Ear exam is within normal.    Rapid strep was performed  pregnant  - Are breastfeeding a child  - Have any serious illnesses    Tell your healthcare provider about all the medicines you take, including prescription and over-the-counter medicines, vitamins, and herbal supplements  Some medicines may interact with PAXLOVID and may cause serious side effects  Keep a list of your medicines to show your healthcare provider and pharmacist when you get a new medicine  You can ask your healthcare provider or pharmacist for a list of medicines that interact with PAXLOVID  Do not start taking a new medicine without telling your healthcare provider  Your healthcare provider can tell you if it is safe to take PAXLOVID with other medicines  Tell your healthcare provider if you are taking combined hormonal contraceptive  PAXLOVID may affect how your birth control pills work  Females who are able to become pregnant should use another effective alternative form of contraception or an additional barrier method of contraception  Talk to your healthcare provider if you have any questions about contraceptive methods that might be right for you  How do I take PAXLOVID? PAXLOVID consists of 2 medicines: nirmatrelvir and ritonavir  - Take 2 pink tablets of nirmatrelvir with 1 white tablet of ritonavir by mouth 2 times each day (in the morning and in the evening) for 5 days  For each dose, take all 3 tablets at the same time  - If you have kidney disease, talk to your healthcare provider  You may need a different dose  - Swallow the tablets whole  Do not chew, break, or crush the tablets  - Take PAXLOVID with or without food  - Do not stop taking PAXLOVID without talking to your healthcare provider, even if you feel better  - If you miss a dose of PAXLOVID within 8 hours of the time it is usually taken, take it as soon as you remember  If you miss a dose by more than 8 hours, skip the missed dose and take the next dose at your regular time   Do not take 2 doses of PAXLOVID at and negative.  Rapid COVID was performed and negative.  Rapid influenza AMB were performed and positive for influenza A.    Diagnosed with influenza A infection at this time.  I recommend albuterol inhaler in addition to Zyrtec and Flonase to help with congestion.  She is outside the window of Tamiflu, and has no other medical history.  I did discuss the risk of post influenza pneumonia with mom.  I recommend monitor for any worsening fevers, cough.  I did discuss when to go to ER versus PCP.  Anytime having worsening signs or symptoms I recommend following up.    Assessment   Problem List Items Addressed This Visit    None     Visit Diagnoses     Sore throat    -  Primary    Relevant Orders    POCT SARS-COV-2 ANTIGEN (Completed)    POCT INFLUENZA A/B (Completed)    POCT RAPID STREP A (Completed)    Influenza A        Relevant Medications    albuterol 108 (90 Base) MCG/ACT inhaler    fluticasone (FLONASE) 50 MCG/ACT nasal spray            Instructions provided as documented in the AVS.     the same time  - If you take too much PAXLOVID, call your healthcare provider or go to the nearest hospital emergency room right away  - If you are taking a ritonavir- or cobicistat-containing medicine to treat hepatitis C or Human Immunodeficiency Virus (HIV), you should continue to take your medicine as prescribed by your healthcare provider   - Talk to your healthcare provider if you do not feel better or if you feel worse after 5 days  Who should generally not take PAXLOVID? Do not take PAXLOVID if:  You are allergic to nirmatrelvir, ritonavir, or any of the ingredients in PAXLOVID  You are taking any of the following medicines:  - Alfuzosin  - Pethidine, piroxicam, propoxyphene  - Ranolazine  - Amiodarone, dronedarone, flecainide, propafenone, quinidine  - Colchicine  - Lurasidone, pimozide, clozapine  - Dihydroergotamine, ergotamine, methylergonovine  - Lovastatin, simvastatin  - Sildenafil (Revatio®) for pulmonary arterial hypertension (PAH)  - Triazolam, oral midazolam  - Apalutamide  - Carbamazepine, phenobarbital, phenytoin  - Rifampin  - St  Shaileshs Wort (hypericum perforatum)    What are the important possible side effects of PAXLOVID? Possible side effects of PAXLOVID are:  - Liver Problems  Tell your healthcare provider right away if you have any of these signs and symptoms of liver problems: loss of appetite, yellowing of your skin and the whites of eyes (jaundice), dark-colored urine, pale colored stools and itchy skin, stomach area (abdominal) pain  - Resistance to HIV Medicines  If you have untreated HIV infection, PAXLOVID may lead to some HIV medicines not working as well in the future  - Other possible side effects include: altered sense of taste, diarrhea, high blood pressure, or muscle aches    These are not all the possible side effects of PAXLOVID  Not many people have taken PAXLOVID  Serious and unexpected side effects may happen   [address] is still being studied, so it is possible that all of the risks are not known at this time  What other treatment choices are there? Like [place] may allow for the emergency use of other medicines to treat people with COVID-19  Go to https://InfoRemate/ for information on the emergency use of other medicines that are authorized by FDA to treat people with COVID-19  Your healthcare provider may talk with you about clinical trials for which you may be eligible  It is your choice to be treated or not to be treated with PAXLOVID  Should you decide not to receive it or for your child not to receive it, it will not change your standard medical care  What if I am pregnant or breastfeeding? There is no experience treating pregnant women or breastfeeding mothers with PAXLOVID  For a mother and unborn baby, the benefit of taking PAXLOVID may be greater than the risk from the treatment  If you are pregnant, discuss your options and specific situation with your healthcare provider  It is recommended that you use effective barrier contraception or do not have sexual activity while taking PAXLOVID  If you are breastfeeding, discuss your options and specific situation with your healthcare provider  How do I report side effects with PAXLOVID? Contact your healthcare provider if you have any side effects that bother you or do not go away  Report side effects to FDA MedWatch at www fda gov/medwatch or call 5-556-BLT4144 or you can report side effects to [facility]  at the contact information provided below  Website Fax number Telephone number   [employer] [phone]     How should I store [address]? Store PAXLOVID tablets at room temperature between 68°F to 77°F (20°C to 25°C)      Full fact sheet for patients, parents, and caregivers can be found at: [employer] ann    I have spent 10 minutes directly with the patient  Greater than 50% of this time was spent in counseling/coordination of care regarding: diagnostic results, prognosis, instructions for management, patient and family education, importance of treatment compliance and risk factor reductions  Encounter provider Beck Godinez DO    Provider located at [address] [place] [address]  [address] [place] [id number] [address]  [phone]    Recent Visits  Date Type Provider Dept   06/03/22 Telephone [facility]   Showing recent visits within past 7 days and meeting all other requirements  Future Appointments  No visits were found meeting these conditions  Showing future appointments within next 150 days and meeting all other requirements     This virtual check-in was done via telephone and she agrees to proceed  Patient agrees to participate in a virtual check in via telephone or video visit instead of presenting to the office to address urgent/immediate medical needs  Patient is aware this is a billable service  After connecting through Telephone, the patient was identified by name and date of birth  Francisco J Olivares was informed that this was a telemedicine visit and that the exam was being conducted confidentially over secure lines  My office door was closed  No one else was in the room  Francisco J Olivares acknowledged consent and understanding of privacy and security of the telemedicine visit  I informed the patient that I have reviewed her record in Epic and presented the opportunity for her to ask any questions regarding the visit today  The patient agreed to participate  It was my intent to perform this visit via video technology but the patient was not able to do a video connection so the visit was completed via audio telephone only          Verification of patient location:  Patient is located in the following state in which I hold an active license: [place]    Subjective: Jeannette Mayfield is a 62 y o  female who is concerned about COVID-19  Patient's symptoms include nasal congestion, rhinorrhea, anosmia, loss of taste and headache  Patient denies fever, chills, fatigue, malaise, sore throat, cough, shortness of breath, chest tightness, abdominal pain, nausea, vomiting, diarrhea and myalgias       - Date of symptom onset: 6/7/2022      COVID-19 vaccination status: Fully vaccinated (primary series)    Exposure:   Contact with a person who is under investigation (PUI) for or who is positive for COVID-19 within the last 14 days?: No    Hospitalized recently for fever and/or lower respiratory symptoms?: No      Currently a healthcare worker that is involved in direct patient care?: No      Works in a special setting where the risk of COVID-19 transmission may be high? (this may include long-term care, correctional and [facility] facilities; homeless shelters; assisted-living facilities and group homes ): No      Resident in a special setting where the risk of COVID-19 transmission may be high? (this may include long-term care, correctional and [facility] facilities; homeless shelters; assisted-living facilities and group homes ): No      Lab Results   Component Value Date    SARSCOV2 Negative 12/28/2021    Terenceoscar Hola Not Detected 06/30/2020     Past Medical History:   Diagnosis Date    Depression     [facility] [address] [facility]     Diabetes mellitus [facility] [address] [facility]     Fibromyalgia     GERD (gastroesophageal reflux disease)     Hypothyroid     Pneumonia     Shortness of breath      Past Surgical History:   Procedure Laterality Date    KNEE CARTILAGE SURGERY Right     TRACHEOSTOMY       Current Outpatient Medications   Medication Sig Dispense Refill    nirmatrelvir & ritonavir (Paxlovid) tablet therapy pack Take 3 tablets by mouth 2 (two) times a day for 5 days Take 2 nirmatrelvir tablets + 1 ritonavir tablet together per dose 30 tablet 0    atorvastatin (LIPITOR) 20 mg tablet Take 1 tablet (20 mg total) by mouth daily 90 tablet 2    ciclopirox (PENLAC) 8 % solution Apply topically daily at bedtime 6 6 mL 3    diclofenac (VOLTAREN) 75 mg EC tablet Take 1 tablet (75 mg total) by mouth 2 (two) times a day PRN 60 tablet 1    ergocalciferol (VITAMIN D2) 50,000 units Take 1 capsule (50,000 Units total) by mouth every 28 days for 3 days 3 capsule 0    folic acid (FOLVITE) 1 mg tablet Take 1,000 mcg by mouth daily      hydroxychloroquine (PLAQUENIL) 200 mg tablet Take 200 mg by mouth 2 (two) times a day with meals      leucovorin (WELLCOVORIN) 10 MG tablet TAKE 2 TABLETS BY MOUTH 18-24 HOURS STATUS POST (AFTER) METHOTREXATE DOSING      meloxicam (MOBIC) 15 mg tablet Take 1 tablet (15 mg total) by mouth daily as needed for mild pain 90 tablet 1    metFORMIN (GLUCOPHAGE-XR) 750 mg 24 hr tablet Take 1 tablet (750 mg total) by mouth daily with breakfast 90 tablet 1    methotrexate 2 5 MG tablet TAKE 4 TABLETS BY MOUTH EVERY FRIDAY      oxyCODONE-acetaminophen (PERCOCET) 5-325 mg per tablet Take 1 tablet by mouth every 6 (six) hours as needed for moderate painMax Daily Amount: 4 tablets 30 tablet 0    pantoprazole (PROTONIX) 40 mg tablet Take 1 tablet (40 mg total) by mouth 2 (two) times a day 28 tablet 3    predniSONE 5 mg tablet       traMADol (ULTRAM) 50 mg tablet Take 1 tablet (50 mg total) by mouth every 8 (eight) hours as needed for severe pain 60 tablet 0    Vitamin D, Cholecalciferol, 25 MCG (1000 UT) TABS Take 2 tablets by mouth once daily 180 tablet 0     No current facility-administered medications for this visit  Allergies   Allergen Reactions    Penicillins     Wellbutrin [Bupropion]        Review of Systems   Constitutional: Negative for chills, fatigue and fever  HENT: Positive for congestion and rhinorrhea  Negative for sore throat      Respiratory: Negative for cough, chest tightness and shortness of breath  Gastrointestinal: Negative for abdominal pain, diarrhea, nausea and vomiting  Musculoskeletal: Negative for myalgias  Neurological: Positive for headaches  Objective: There were no vitals filed for this visit  Physical Exam    VIRTUAL VISIT DISCLAIMER    Delmer Ortiz verbally agrees to participate in [employer]  Pt is aware that [employer] could be limited without vital signs or the ability to perform a full hands-on physical Nolene Query understands she or the provider may request at any time to terminate the video visit and request the patient to seek care or treatment in person

## 2022-11-23 ENCOUNTER — VBI (OUTPATIENT)
Dept: ADMINISTRATIVE | Facility: OTHER | Age: 62
End: 2022-11-23

## 2022-12-06 ENCOUNTER — VBI (OUTPATIENT)
Dept: ADMINISTRATIVE | Facility: OTHER | Age: 62
End: 2022-12-06

## 2022-12-08 ENCOUNTER — VBI (OUTPATIENT)
Dept: ADMINISTRATIVE | Facility: OTHER | Age: 62
End: 2022-12-08

## 2023-01-04 ENCOUNTER — VBI (OUTPATIENT)
Dept: ADMINISTRATIVE | Facility: OTHER | Age: 63
End: 2023-01-04

## 2023-01-10 ENCOUNTER — VBI (OUTPATIENT)
Dept: ADMINISTRATIVE | Facility: OTHER | Age: 63
End: 2023-01-10

## 2023-01-21 DIAGNOSIS — E55.9 VITAMIN D DEFICIENCY: ICD-10-CM

## 2023-01-23 RX ORDER — MULTIVIT-MIN/IRON/FOLIC ACID/K 18-600-40
CAPSULE ORAL
Qty: 180 TABLET | Refills: 0 | Status: SHIPPED | OUTPATIENT
Start: 2023-01-23

## 2023-02-03 DIAGNOSIS — E11.9 TYPE 2 DIABETES MELLITUS WITHOUT COMPLICATION, WITHOUT LONG-TERM CURRENT USE OF INSULIN (HCC): ICD-10-CM

## 2023-02-03 RX ORDER — METFORMIN HYDROCHLORIDE 750 MG/1
TABLET, EXTENDED RELEASE ORAL
Qty: 90 TABLET | Refills: 0 | Status: SHIPPED | OUTPATIENT
Start: 2023-02-03

## 2023-02-20 DIAGNOSIS — E78.01 FAMILIAL HYPERCHOLESTEROLEMIA: ICD-10-CM

## 2023-02-21 RX ORDER — ATORVASTATIN CALCIUM 20 MG/1
TABLET, FILM COATED ORAL
Qty: 90 TABLET | Refills: 0 | Status: SHIPPED | OUTPATIENT
Start: 2023-02-21

## 2023-03-03 ENCOUNTER — TRANSCRIBE ORDERS (OUTPATIENT)
Dept: LAB | Facility: CLINIC | Age: 63
End: 2023-03-03

## 2023-03-03 ENCOUNTER — APPOINTMENT (OUTPATIENT)
Dept: LAB | Facility: CLINIC | Age: 63
End: 2023-03-03

## 2023-03-03 DIAGNOSIS — M05.9 SEROPOSITIVE RHEUMATOID ARTHRITIS (HCC): Primary | ICD-10-CM

## 2023-03-03 DIAGNOSIS — M05.9 SEROPOSITIVE RHEUMATOID ARTHRITIS (HCC): ICD-10-CM

## 2023-03-03 LAB
ALBUMIN SERPL BCP-MCNC: 3.8 G/DL (ref 3.5–5)
ALP SERPL-CCNC: 163 U/L (ref 46–116)
ALT SERPL W P-5'-P-CCNC: 74 U/L (ref 12–78)
AST SERPL W P-5'-P-CCNC: 54 U/L (ref 5–45)
BASOPHILS # BLD AUTO: 0.03 THOUSANDS/ÂΜL (ref 0–0.1)
BASOPHILS NFR BLD AUTO: 1 % (ref 0–1)
BILIRUB DIRECT SERPL-MCNC: 0.35 MG/DL (ref 0–0.2)
BILIRUB SERPL-MCNC: 0.91 MG/DL (ref 0.2–1)
CREAT SERPL-MCNC: 0.65 MG/DL (ref 0.6–1.3)
CRP SERPL QL: <3 MG/L
EOSINOPHIL # BLD AUTO: 0.14 THOUSAND/ÂΜL (ref 0–0.61)
EOSINOPHIL NFR BLD AUTO: 3 % (ref 0–6)
ERYTHROCYTE [DISTWIDTH] IN BLOOD BY AUTOMATED COUNT: 12.6 % (ref 11.6–15.1)
ERYTHROCYTE [SEDIMENTATION RATE] IN BLOOD: 22 MM/HOUR (ref 0–29)
GFR SERPL CREATININE-BSD FRML MDRD: 96 ML/MIN/1.73SQ M
HCT VFR BLD AUTO: 39.4 % (ref 34.8–46.1)
HGB BLD-MCNC: 13.1 G/DL (ref 11.5–15.4)
IMM GRANULOCYTES # BLD AUTO: 0.02 THOUSAND/UL (ref 0–0.2)
IMM GRANULOCYTES NFR BLD AUTO: 0 % (ref 0–2)
LYMPHOCYTES # BLD AUTO: 1.15 THOUSANDS/ÂΜL (ref 0.6–4.47)
LYMPHOCYTES NFR BLD AUTO: 25 % (ref 14–44)
MCH RBC QN AUTO: 30.5 PG (ref 26.8–34.3)
MCHC RBC AUTO-ENTMCNC: 33.2 G/DL (ref 31.4–37.4)
MCV RBC AUTO: 92 FL (ref 82–98)
MONOCYTES # BLD AUTO: 0.39 THOUSAND/ÂΜL (ref 0.17–1.22)
MONOCYTES NFR BLD AUTO: 9 % (ref 4–12)
NEUTROPHILS # BLD AUTO: 2.81 THOUSANDS/ÂΜL (ref 1.85–7.62)
NEUTS SEG NFR BLD AUTO: 62 % (ref 43–75)
NRBC BLD AUTO-RTO: 0 /100 WBCS
PLATELET # BLD AUTO: 77 THOUSANDS/UL (ref 149–390)
PMV BLD AUTO: 14.4 FL (ref 8.9–12.7)
PROT SERPL-MCNC: 6.9 G/DL (ref 6.4–8.4)
RBC # BLD AUTO: 4.29 MILLION/UL (ref 3.81–5.12)
WBC # BLD AUTO: 4.54 THOUSAND/UL (ref 4.31–10.16)

## 2023-03-04 LAB
CRYOGLOB RF SER-ACNC: ABNORMAL [IU]/ML
RHEUMATOID FACT SER QL LA: POSITIVE

## 2023-03-07 LAB — CCP AB SER IA-ACNC: 182

## 2023-04-27 ENCOUNTER — APPOINTMENT (OUTPATIENT)
Dept: LAB | Facility: MEDICAL CENTER | Age: 63
End: 2023-04-27

## 2023-04-27 DIAGNOSIS — M05.9 JUVENILE SEROPOSITIVE ARTHRITIS (HCC): ICD-10-CM

## 2023-04-27 LAB
ALBUMIN SERPL BCP-MCNC: 3.7 G/DL (ref 3.5–5)
ALP SERPL-CCNC: 236 U/L (ref 46–116)
ALT SERPL W P-5'-P-CCNC: 155 U/L (ref 12–78)
AST SERPL W P-5'-P-CCNC: 91 U/L (ref 5–45)
BASOPHILS # BLD AUTO: 0.06 THOUSANDS/ΜL (ref 0–0.1)
BASOPHILS NFR BLD AUTO: 1 % (ref 0–1)
BILIRUB DIRECT SERPL-MCNC: 0.6 MG/DL (ref 0–0.2)
BILIRUB SERPL-MCNC: 1.37 MG/DL (ref 0.2–1)
CREAT SERPL-MCNC: 0.93 MG/DL (ref 0.6–1.3)
CRP SERPL QL: <3 MG/L
EOSINOPHIL # BLD AUTO: 0.09 THOUSAND/ΜL (ref 0–0.61)
EOSINOPHIL NFR BLD AUTO: 1 % (ref 0–6)
ERYTHROCYTE [DISTWIDTH] IN BLOOD BY AUTOMATED COUNT: 12.3 % (ref 11.6–15.1)
ERYTHROCYTE [SEDIMENTATION RATE] IN BLOOD: 19 MM/HOUR (ref 0–29)
GFR SERPL CREATININE-BSD FRML MDRD: 66 ML/MIN/1.73SQ M
HCT VFR BLD AUTO: 43.4 % (ref 34.8–46.1)
HGB BLD-MCNC: 13.8 G/DL (ref 11.5–15.4)
IMM GRANULOCYTES # BLD AUTO: 0.03 THOUSAND/UL (ref 0–0.2)
IMM GRANULOCYTES NFR BLD AUTO: 1 % (ref 0–2)
LYMPHOCYTES # BLD AUTO: 1.13 THOUSANDS/ΜL (ref 0.6–4.47)
LYMPHOCYTES NFR BLD AUTO: 17 % (ref 14–44)
MCH RBC QN AUTO: 29.6 PG (ref 26.8–34.3)
MCHC RBC AUTO-ENTMCNC: 31.8 G/DL (ref 31.4–37.4)
MCV RBC AUTO: 93 FL (ref 82–98)
MONOCYTES # BLD AUTO: 0.63 THOUSAND/ΜL (ref 0.17–1.22)
MONOCYTES NFR BLD AUTO: 10 % (ref 4–12)
NEUTROPHILS # BLD AUTO: 4.69 THOUSANDS/ΜL (ref 1.85–7.62)
NEUTS SEG NFR BLD AUTO: 70 % (ref 43–75)
NRBC BLD AUTO-RTO: 0 /100 WBCS
PLATELET # BLD AUTO: 75 THOUSANDS/UL (ref 149–390)
PROT SERPL-MCNC: 7.1 G/DL (ref 6.4–8.4)
RBC # BLD AUTO: 4.66 MILLION/UL (ref 3.81–5.12)
WBC # BLD AUTO: 6.63 THOUSAND/UL (ref 4.31–10.16)

## 2023-05-08 DIAGNOSIS — E55.9 VITAMIN D DEFICIENCY: ICD-10-CM

## 2023-05-09 RX ORDER — MULTIVIT-MIN/IRON/FOLIC ACID/K 18-600-40
CAPSULE ORAL
Qty: 180 TABLET | Refills: 0 | Status: SHIPPED | OUTPATIENT
Start: 2023-05-09

## 2023-05-10 DIAGNOSIS — E11.9 TYPE 2 DIABETES MELLITUS WITHOUT COMPLICATION, WITHOUT LONG-TERM CURRENT USE OF INSULIN (HCC): ICD-10-CM

## 2023-05-11 RX ORDER — METFORMIN HYDROCHLORIDE 750 MG/1
TABLET, EXTENDED RELEASE ORAL
Qty: 90 TABLET | Refills: 0 | Status: SHIPPED | OUTPATIENT
Start: 2023-05-11

## 2023-06-02 DIAGNOSIS — M05.79 RHEUMATOID ARTHRITIS INVOLVING MULTIPLE SITES WITH POSITIVE RHEUMATOID FACTOR (HCC): ICD-10-CM

## 2023-06-02 RX ORDER — MELOXICAM 15 MG/1
15 TABLET ORAL DAILY
Qty: 90 TABLET | Refills: 0 | Status: SHIPPED | OUTPATIENT
Start: 2023-06-02

## 2023-06-07 DIAGNOSIS — E78.01 FAMILIAL HYPERCHOLESTEROLEMIA: ICD-10-CM

## 2023-06-08 RX ORDER — ATORVASTATIN CALCIUM 20 MG/1
20 TABLET, FILM COATED ORAL DAILY
Qty: 90 TABLET | Refills: 0 | Status: SHIPPED | OUTPATIENT
Start: 2023-06-08

## 2023-06-21 ENCOUNTER — VBI (OUTPATIENT)
Dept: ADMINISTRATIVE | Facility: OTHER | Age: 63
End: 2023-06-21

## 2023-06-22 ENCOUNTER — APPOINTMENT (OUTPATIENT)
Dept: LAB | Facility: MEDICAL CENTER | Age: 63
End: 2023-06-22
Payer: COMMERCIAL

## 2023-06-22 DIAGNOSIS — M05.9 JUVENILE SEROPOSITIVE ARTHRITIS (HCC): ICD-10-CM

## 2023-06-22 LAB
ALBUMIN SERPL BCP-MCNC: 3.3 G/DL (ref 3.5–5)
ALP SERPL-CCNC: 214 U/L (ref 46–116)
ALT SERPL W P-5'-P-CCNC: 83 U/L (ref 12–78)
AST SERPL W P-5'-P-CCNC: 80 U/L (ref 5–45)
BASOPHILS # BLD AUTO: 0.07 THOUSANDS/ÂΜL (ref 0–0.1)
BASOPHILS NFR BLD AUTO: 1 % (ref 0–1)
BILIRUB DIRECT SERPL-MCNC: 0.52 MG/DL (ref 0–0.2)
BILIRUB SERPL-MCNC: 1.2 MG/DL (ref 0.2–1)
C3 SERPL-MCNC: 130 MG/DL (ref 90–180)
C4 SERPL-MCNC: 23 MG/DL (ref 10–40)
CREAT SERPL-MCNC: 0.66 MG/DL (ref 0.6–1.3)
EOSINOPHIL # BLD AUTO: 0.13 THOUSAND/ÂΜL (ref 0–0.61)
EOSINOPHIL NFR BLD AUTO: 2 % (ref 0–6)
ERYTHROCYTE [DISTWIDTH] IN BLOOD BY AUTOMATED COUNT: 12.6 % (ref 11.6–15.1)
GFR SERPL CREATININE-BSD FRML MDRD: 95 ML/MIN/1.73SQ M
HCT VFR BLD AUTO: 39 % (ref 34.8–46.1)
HGB BLD-MCNC: 13 G/DL (ref 11.5–15.4)
IMM GRANULOCYTES # BLD AUTO: 0.01 THOUSAND/UL (ref 0–0.2)
IMM GRANULOCYTES NFR BLD AUTO: 0 % (ref 0–2)
LYMPHOCYTES # BLD AUTO: 1.03 THOUSANDS/ÂΜL (ref 0.6–4.47)
LYMPHOCYTES NFR BLD AUTO: 16 % (ref 14–44)
MCH RBC QN AUTO: 30.2 PG (ref 26.8–34.3)
MCHC RBC AUTO-ENTMCNC: 33.3 G/DL (ref 31.4–37.4)
MCV RBC AUTO: 91 FL (ref 82–98)
MONOCYTES # BLD AUTO: 0.66 THOUSAND/ÂΜL (ref 0.17–1.22)
MONOCYTES NFR BLD AUTO: 10 % (ref 4–12)
NEUTROPHILS # BLD AUTO: 4.72 THOUSANDS/ÂΜL (ref 1.85–7.62)
NEUTS SEG NFR BLD AUTO: 71 % (ref 43–75)
NRBC BLD AUTO-RTO: 0 /100 WBCS
PLATELET # BLD AUTO: 64 THOUSANDS/UL (ref 149–390)
PROT SERPL-MCNC: 6.8 G/DL (ref 6.4–8.4)
RBC # BLD AUTO: 4.3 MILLION/UL (ref 3.81–5.12)
WBC # BLD AUTO: 6.62 THOUSAND/UL (ref 4.31–10.16)

## 2023-06-22 PROCEDURE — 36415 COLL VENOUS BLD VENIPUNCTURE: CPT

## 2023-06-22 PROCEDURE — 86235 NUCLEAR ANTIGEN ANTIBODY: CPT

## 2023-06-22 PROCEDURE — 86038 ANTINUCLEAR ANTIBODIES: CPT

## 2023-06-22 PROCEDURE — 85025 COMPLETE CBC W/AUTO DIFF WBC: CPT

## 2023-06-22 PROCEDURE — 80076 HEPATIC FUNCTION PANEL: CPT

## 2023-06-22 PROCEDURE — 82565 ASSAY OF CREATININE: CPT

## 2023-06-22 PROCEDURE — 86160 COMPLEMENT ANTIGEN: CPT

## 2023-06-22 PROCEDURE — 86225 DNA ANTIBODY NATIVE: CPT

## 2023-06-23 LAB — ANA SER QL IA: NEGATIVE

## 2023-06-24 LAB — DSDNA AB SER-ACNC: <1 IU/ML (ref 0–9)

## 2023-06-26 ENCOUNTER — TRANSCRIBE ORDERS (OUTPATIENT)
Dept: LAB | Facility: CLINIC | Age: 63
End: 2023-06-26

## 2023-06-26 ENCOUNTER — APPOINTMENT (OUTPATIENT)
Dept: LAB | Facility: CLINIC | Age: 63
End: 2023-06-26
Payer: COMMERCIAL

## 2023-06-26 DIAGNOSIS — M05.9 JUVENILE SEROPOSITIVE ARTHRITIS (HCC): Primary | ICD-10-CM

## 2023-06-26 DIAGNOSIS — E11.9 TYPE 2 DIABETES MELLITUS WITHOUT COMPLICATION, WITHOUT LONG-TERM CURRENT USE OF INSULIN (HCC): ICD-10-CM

## 2023-06-26 DIAGNOSIS — M05.9 JUVENILE SEROPOSITIVE ARTHRITIS (HCC): ICD-10-CM

## 2023-06-26 LAB
ALBUMIN SERPL BCP-MCNC: 3.3 G/DL (ref 3.5–5)
ALP SERPL-CCNC: 196 U/L (ref 46–116)
ALT SERPL W P-5'-P-CCNC: 77 U/L (ref 12–78)
ANION GAP SERPL CALCULATED.3IONS-SCNC: 5 MMOL/L
AST SERPL W P-5'-P-CCNC: 64 U/L (ref 5–45)
BASOPHILS # BLD AUTO: 0.08 THOUSANDS/ÂΜL (ref 0–0.1)
BASOPHILS NFR BLD AUTO: 1 % (ref 0–1)
BILIRUB DIRECT SERPL-MCNC: 0.55 MG/DL (ref 0–0.2)
BILIRUB SERPL-MCNC: 1.47 MG/DL (ref 0.2–1)
BUN SERPL-MCNC: 18 MG/DL (ref 5–25)
C3 SERPL-MCNC: 139 MG/DL (ref 90–180)
C4 SERPL-MCNC: 25 MG/DL (ref 10–40)
CALCIUM SERPL-MCNC: 9.1 MG/DL (ref 8.3–10.1)
CHLORIDE SERPL-SCNC: 108 MMOL/L (ref 96–108)
CO2 SERPL-SCNC: 26 MMOL/L (ref 21–32)
CREAT SERPL-MCNC: 0.78 MG/DL (ref 0.6–1.3)
CRP SERPL QL: <3 MG/L
EOSINOPHIL # BLD AUTO: 0.18 THOUSAND/ÂΜL (ref 0–0.61)
EOSINOPHIL NFR BLD AUTO: 2 % (ref 0–6)
ERYTHROCYTE [DISTWIDTH] IN BLOOD BY AUTOMATED COUNT: 12.4 % (ref 11.6–15.1)
ERYTHROCYTE [SEDIMENTATION RATE] IN BLOOD: 29 MM/HOUR (ref 0–29)
GFR SERPL CREATININE-BSD FRML MDRD: 82 ML/MIN/1.73SQ M
GGT SERPL-CCNC: 772 U/L (ref 5–85)
GLUCOSE P FAST SERPL-MCNC: 186 MG/DL (ref 65–99)
HCT VFR BLD AUTO: 41.3 % (ref 34.8–46.1)
HGB BLD-MCNC: 13.7 G/DL (ref 11.5–15.4)
IMM GRANULOCYTES # BLD AUTO: 0.02 THOUSAND/UL (ref 0–0.2)
IMM GRANULOCYTES NFR BLD AUTO: 0 % (ref 0–2)
LYMPHOCYTES # BLD AUTO: 1.29 THOUSANDS/ÂΜL (ref 0.6–4.47)
LYMPHOCYTES NFR BLD AUTO: 16 % (ref 14–44)
MCH RBC QN AUTO: 30.1 PG (ref 26.8–34.3)
MCHC RBC AUTO-ENTMCNC: 33.2 G/DL (ref 31.4–37.4)
MCV RBC AUTO: 91 FL (ref 82–98)
MONOCYTES # BLD AUTO: 0.84 THOUSAND/ÂΜL (ref 0.17–1.22)
MONOCYTES NFR BLD AUTO: 10 % (ref 4–12)
NEUTROPHILS # BLD AUTO: 5.85 THOUSANDS/ÂΜL (ref 1.85–7.62)
NEUTS SEG NFR BLD AUTO: 71 % (ref 43–75)
NRBC BLD AUTO-RTO: 0 /100 WBCS
PLATELET # BLD AUTO: 88 THOUSANDS/UL (ref 149–390)
POTASSIUM SERPL-SCNC: 3.8 MMOL/L (ref 3.5–5.3)
PROT SERPL-MCNC: 7.1 G/DL (ref 6.4–8.4)
RBC # BLD AUTO: 4.55 MILLION/UL (ref 3.81–5.12)
SODIUM SERPL-SCNC: 139 MMOL/L (ref 135–147)
WBC # BLD AUTO: 8.26 THOUSAND/UL (ref 4.31–10.16)

## 2023-06-26 PROCEDURE — 86431 RHEUMATOID FACTOR QUANT: CPT

## 2023-06-26 PROCEDURE — 86160 COMPLEMENT ANTIGEN: CPT

## 2023-06-26 PROCEDURE — 80048 BASIC METABOLIC PNL TOTAL CA: CPT

## 2023-06-26 PROCEDURE — 85652 RBC SED RATE AUTOMATED: CPT

## 2023-06-26 PROCEDURE — 82977 ASSAY OF GGT: CPT

## 2023-06-26 PROCEDURE — 85025 COMPLETE CBC W/AUTO DIFF WBC: CPT

## 2023-06-26 PROCEDURE — 86140 C-REACTIVE PROTEIN: CPT

## 2023-06-26 PROCEDURE — 86430 RHEUMATOID FACTOR TEST QUAL: CPT

## 2023-06-26 PROCEDURE — 36415 COLL VENOUS BLD VENIPUNCTURE: CPT

## 2023-06-26 PROCEDURE — 80076 HEPATIC FUNCTION PANEL: CPT

## 2023-06-27 LAB
CRYOGLOB RF SER-ACNC: ABNORMAL [IU]/ML
HISTONE IGG SER IA-ACNC: 0.9 UNITS (ref 0–0.9)
RHEUMATOID FACT SER QL LA: POSITIVE

## 2023-07-20 ENCOUNTER — HOSPITAL ENCOUNTER (OUTPATIENT)
Dept: ULTRASOUND IMAGING | Facility: MEDICAL CENTER | Age: 63
Discharge: HOME | End: 2023-07-20
Payer: COMMERCIAL

## 2023-07-20 DIAGNOSIS — R94.5 ABNORMAL RESULTS OF LIVER FUNCTION STUDIES: ICD-10-CM

## 2023-07-20 PROCEDURE — 76700 US EXAM ABDOM COMPLETE: CPT

## 2023-07-28 ENCOUNTER — TELEPHONE (OUTPATIENT)
Age: 63
End: 2023-07-28

## 2023-07-28 NOTE — TELEPHONE ENCOUNTER
I returned call to patient lmom  Advised to return our call to specify what exactly she needs advice on

## 2023-07-28 NOTE — TELEPHONE ENCOUNTER
Patients GI provider:  Dr. Eliseo Hassan    Number to return call: [phone]    Reason for call: Pt calling because she was seen by Dr Eliseo Hassan in 2020, and made a follow up appointment with him for September.  She would like to know if there are guidelines she can be sent for diet to hold her over until her appointment    Scheduled procedure/appointment date if applicable: Appt 1/55/78

## 2023-08-09 ENCOUNTER — VBI (OUTPATIENT)
Dept: ADMINISTRATIVE | Facility: OTHER | Age: 63
End: 2023-08-09

## 2023-08-12 ENCOUNTER — HOSPITAL ENCOUNTER (OUTPATIENT)
Dept: ULTRASOUND IMAGING | Facility: HOSPITAL | Age: 63
Discharge: HOME | End: 2023-08-12
Attending: INTERNAL MEDICINE
Payer: COMMERCIAL

## 2023-08-12 DIAGNOSIS — M05.9 RHEUMATOID ARTHRITIS WITH RHEUMATOID FACTOR, UNSPECIFIED (HCC): ICD-10-CM

## 2023-08-12 PROCEDURE — 76981 USE PARENCHYMA: CPT

## 2023-08-15 DIAGNOSIS — E55.9 VITAMIN D DEFICIENCY: ICD-10-CM

## 2023-08-15 DIAGNOSIS — E11.9 TYPE 2 DIABETES MELLITUS WITHOUT COMPLICATION, WITHOUT LONG-TERM CURRENT USE OF INSULIN (HCC): ICD-10-CM

## 2023-08-15 RX ORDER — MULTIVIT-MIN/IRON/FOLIC ACID/K 18-600-40
2 CAPSULE ORAL DAILY
Qty: 180 TABLET | Refills: 0 | Status: SHIPPED | OUTPATIENT
Start: 2023-08-15

## 2023-08-15 RX ORDER — METFORMIN HYDROCHLORIDE 750 MG/1
750 TABLET, EXTENDED RELEASE ORAL
Qty: 90 TABLET | Refills: 0 | Status: SHIPPED | OUTPATIENT
Start: 2023-08-15

## 2023-08-21 ENCOUNTER — TELEPHONE (OUTPATIENT)
Dept: GASTROENTEROLOGY | Facility: CLINIC | Age: 63
End: 2023-08-21

## 2023-08-21 NOTE — TELEPHONE ENCOUNTER
I called the patient in regards to appt that she scheduled for herself through [facility]. The appt notes say "cirrhosis" which indicate visit with Hepatology not regular GI. I lvm for call back.

## 2023-08-31 ENCOUNTER — OFFICE VISIT (OUTPATIENT)
Dept: GASTROENTEROLOGY | Facility: CLINIC | Age: 63
End: 2023-08-31
Payer: COMMERCIAL

## 2023-08-31 VITALS
WEIGHT: 232.4 LBS | SYSTOLIC BLOOD PRESSURE: 124 MMHG | BODY MASS INDEX: 45.62 KG/M2 | TEMPERATURE: 97.1 F | DIASTOLIC BLOOD PRESSURE: 76 MMHG | HEIGHT: 60 IN

## 2023-08-31 DIAGNOSIS — Z12.11 SCREENING FOR COLON CANCER: ICD-10-CM

## 2023-08-31 DIAGNOSIS — K74.60 CIRRHOSIS OF LIVER WITHOUT ASCITES, UNSPECIFIED HEPATIC CIRRHOSIS TYPE (HCC): Primary | ICD-10-CM

## 2023-08-31 DIAGNOSIS — Z79.631 LONG TERM METHOTREXATE USER: ICD-10-CM

## 2023-08-31 DIAGNOSIS — M05.9 RHEUMATOID ARTHRITIS WITH POSITIVE RHEUMATOID FACTOR, INVOLVING UNSPECIFIED SITE (HCC): ICD-10-CM

## 2023-08-31 DIAGNOSIS — R79.89 ABNORMAL LIVER FUNCTION TESTS: ICD-10-CM

## 2023-08-31 PROCEDURE — 99244 OFF/OP CNSLTJ NEW/EST MOD 40: CPT | Performed by: FAMILY MEDICINE

## 2023-08-31 NOTE — H&P (VIEW-ONLY)
[facility] - Outpatient Consultation  Ada Meade 59 y.o. female MRN: [id number]  Encounter: [id number]          ASSESSMENT AND PLAN:      1. Cirrhosis of liver without ascites, unspecified hepatic cirrhosis type ([address])  2. Rheumatoid arthritis with positive rheumatoid factor, involving unspecified site ([address])  3. Long term methotrexate user  3. Abnormal liver function tests    Summary: Patient is a 59 y.o. female with updated cirrhosis of unknown etiology. Cannot calculate MELD score. Patient with abnormal liver function tests, primarily a mildly elevated alkaline phosphatase and T. bili but more recently of mixed pattern, dating back to January 2022. She has not had a complete serologic evaluation negative for competing causes of liver disease. Recent ultrasound was notable for cirrhotic morphology with subsequent ultrasound notable for F4 cirrhosis. No clinical evidence of hepatic decompensation. Although she has multiple metabolic risk factors for the development of NAFLD, there is no overt evidence of fatty liver disease on imaging. Therefore, the etiology of her cirrhosis remains unclear. Plan to repeat her autoimmune serologies, particularly given her diagnosis of RA, addition to basic serologies, and AFP level for hepatoma screening and serologies to determine patient's immunity status to hep a and B. Dscussed that if her serologies are unrevealing and liver function tests are still abnormal, would recommend further discussion regarding the utility of a liver biopsy. Fortunately, her liver disease appears to be well compensated. She will complete an EGD for variceal screening and is up-to-date with imaging for hepatoma screening. Additional management as below. Ascites   - No clinical or radiographic evidence of ascites. Esophageal Varices   - EGD (7/2020) without evidence of gastric/esophageal varices.   - Ordered a repeat EGD for variceal screening today.    Hepatic Encephalopathy   - No history or clinical evidence of HE on exam today. - Patient aware of signs/sxs's of HE and advised to promptly inform provider if experiencing such. [address] Screening   - U/S (7/2023) without focal liver lesion and AFP ordered today. - Patient will be due for imaging with either an U/S or MRI in 1/2023.  - Continue imaging q6 months and AFP annually. Nutritional Status  - No sarcopenia noted on exam today. - Encouraged high-protein diet in addition to a high-protein snack qHS to prevent prolonged fasting. Vaccines   - Serologies to assess immunity to hep A and B.    - AFP tumor marker; Future  - CBC and Platelet; Future  - Comprehensive metabolic panel; Future  - Protime-INR; Future  - Antimitochondrial antibody; Future  - Anti-smooth muscle antibody, IgG; Future  - IgG, IgA, IgM; Future  - Hepatitis A antibody, total; Future  - Hepatitis B surface antibody; Future  - EGD; Future    4. Screening for colon cancer  Patient is up-to-date with CRC screening. Cologuard 2/2022 negative. Continue with Cologuard q3 years for CRC screening. Follow-up in 3 months.     ______________________________________________________________________    HPI: Patient is a 59 y.o. female who presents today for a consultation regarding    Patient is familiar to [facility] previously seen by Dr. Nkechi Esposito in 2020 for abnormal liver function tests noted to have hepatomegaly and slightly heterogeneous liver parenchyma on ultrasound. U/S elastography was notable for F3 fibrosis. She had a serologic evaluation negative for autoimmune hepatitis, A1AT deficiency, Denver's disease and hemochromatosis. She was noted to have a positive hepatitis B core Ab IgM but with a negative core total antibody, surface antigen, surface antibody and HBV DNA. Also HCV negative.     She has had persistently abnormal liver function tests, of mixed type with most recent hepatic function panel noting AST 64, ALT 77, alk phos 196, T. bili 1.4, and thrombocytopenia prompting an abdominal ultrasound notable for cirrhotic morphology without evidence of portal hypertension. US elastography was notable for F4 fibrosis. Denies a family history of liver disease or liver cancer. She does have autoimmune conditions including rheumatoid arthritis for which she was taking methotrexate x2 years but stopped 3 months prior. Denies EtOH use. Denies signs or symptoms of ascites, bleeding esophageal varices or hepatic encephalopathy. Denies pruritus, confusion, dark urine, bruising easily, yellowing of the eyes and/or skin, new/worsening abdominal distension or swelling of the lower extremities, abdominal pain, overt evidence of GI bleeding, or unintentional weight loss. Computed MELD 3.0 unavailable. Necessary lab results were not found in the last year. Computed MELD-Na unavailable. Necessary lab results were not found in the last year. REVIEW OF SYSTEMS:    CONSTITUTIONAL: Denies any fever, chills, rigors, and weight loss. HEENT: No earache or tinnitus. Denies hearing loss or visual disturbances. CARDIOVASCULAR: No chest pain or palpitations. RESPIRATORY: Denies any cough, hemoptysis, shortness of breath or dyspnea on exertion. GASTROINTESTINAL: As noted in the History of Present Illness. GENITOURINARY: No problems with urination. Denies any hematuria or dysuria. NEUROLOGIC: No dizziness or vertigo, denies headaches. MUSCULOSKELETAL: Denies any muscle or joint pain. SKIN: Denies skin rashes or itching. ENDOCRINE: Denies excessive thirst. Denies intolerance to heat or cold. PSYCHOSOCIAL: Denies depression or anxiety. Denies any recent memory loss.        Historical Information   Past Medical History:   Diagnosis Date   • Depression    • Diabetes ([address] [facility])    • Diabetes mellitus ([address] [facility])    • Fibromyalgia    • GERD (gastroesophageal reflux disease)    • Hypothyroid    • Pneumonia    • Shortness of breath      Past Surgical History:   Procedure Laterality Date   • KNEE CARTILAGE SURGERY Right    • TRACHEOSTOMY       Social History   Social History     Substance and Sexual Activity   Alcohol Use Yes   • Alcohol/week: 2.0 standard drinks of alcohol   • Types: 2 Glasses of wine per week     Social History     Substance and Sexual Activity   Drug Use No     Social History     Tobacco Use   Smoking Status Former   • Packs/day: 0.25   • Years: 10.00   • Total pack years: 2.50   • Types: Cigarettes   • Quit date: 1997   • Years since quittin.6   Smokeless Tobacco Never     Family History   Problem Relation Age of Onset   • Pneumonia Father    • Asthma Brother        Meds/Allergies       Current Outpatient Medications:   •  atorvastatin (LIPITOR) 20 mg tablet  •  hydroxychloroquine (PLAQUENIL) 200 mg tablet  •  metFORMIN (GLUCOPHAGE-XR) 750 mg 24 hr tablet  •  predniSONE 5 mg tablet  •  Vitamin D, Cholecalciferol, 25 MCG (1000 UT) TABS  •  ciclopirox (PENLAC) 8 % solution  •  diclofenac (VOLTAREN) 75 mg EC tablet  •  ergocalciferol (VITAMIN D2) 00,849 units  •  folic acid (FOLVITE) 1 mg tablet  •  leucovorin (WELLCOVORIN) 10 MG tablet  •  meloxicam (MOBIC) 15 mg tablet  •  methotrexate 2.5 MG tablet  •  oxyCODONE-acetaminophen (PERCOCET) 5-325 mg per tablet  •  pantoprazole (PROTONIX) 40 mg tablet  •  traMADol (ULTRAM) 50 mg tablet    Allergies   Allergen Reactions   • Penicillins    • Wellbutrin [Bupropion]            Objective     Blood pressure 124/76, temperature (!) 97.1 °F (36.2 °C), temperature source Tympanic, height 5' (1.524 m), weight 105 kg (232 lb 6.4 oz), not currently breastfeeding. Body mass index is 45.39 kg/m². PHYSICAL EXAM:      General Appearance:   Alert, cooperative, no distress; AAOx3, no asterixis   HEENT:   Normocephalic, atraumatic, anicteric;  No scleral icterus     Neck:  Supple, symmetrical, trachea midline   Lungs:   Clear to auscultation bilaterally; no rales, rhonchi or wheezing; respirations unlabored    Heart[de-identified]   Regular rate and rhythm; no murmur, rub, or gallop. Abdomen:   Soft, non-tender, non-distended; normal bowel sounds; no masses, no organomegaly    Genitalia:   Deferred    Rectal:   Deferred    Extremities:  No palmar erythema, cyanosis, clubbing or edema    Pulses:  2+ and symmetric    Skin:  No spider angiomas, jaundice, rashes, or lesions    Lymph nodes:  No palpable cervical lymphadenopathy        Lab Results:   No visits with results within 1 Day(s) from this visit.    Latest known visit with results is:   Appointment on 06/26/2023   Component Date Value   • WBC 06/26/2023 8.26    • RBC 06/26/2023 4.55    • Hemoglobin 06/26/2023 13.7    • Hematocrit 06/26/2023 41.3    • MCV 06/26/2023 91    • MCH 06/26/2023 30.1    • MCHC 06/26/2023 33.2    • RDW 06/26/2023 12.4    • Platelets 90/84/7549 88 (L)    • nRBC 06/26/2023 0    • Neutrophils Relative 06/26/2023 71    • Immat GRANS % 06/26/2023 0    • Lymphocytes Relative 06/26/2023 16    • Monocytes Relative 06/26/2023 10    • Eosinophils Relative 06/26/2023 2    • Basophils Relative 06/26/2023 1    • Neutrophils Absolute 06/26/2023 5.85    • Immature Grans Absolute 06/26/2023 0.02    • Lymphocytes Absolute 06/26/2023 1.29    • Monocytes Absolute 06/26/2023 0.84    • Eosinophils Absolute 06/26/2023 0.18    • Basophils Absolute 06/26/2023 0.08    • Sed Rate 06/26/2023 29    • Rheumatoid Factor 06/26/2023 Positive (A)    • Total Bilirubin 06/26/2023 1.47 (H)    • Bilirubin, Direct 06/26/2023 0.55 (H)    • Alkaline Phosphatase 06/26/2023 196 (H)    • AST 06/26/2023 64 (H)    • ALT 06/26/2023 77    • Total Protein 06/26/2023 7.1    • Albumin 06/26/2023 3.3 (L)    • C3 Complement 06/26/2023 139.0    • C4, COMPLEMENT 06/26/2023 25.0    • CRP 06/26/2023 <3.0    • Sodium 06/26/2023 139    • Potassium 06/26/2023 3.8    • Chloride 06/26/2023 108    • CO2 06/26/2023 26    • ANION GAP 06/26/2023 5    • BUN 06/26/2023 18    • Creatinine 06/26/2023 0.78    • Glucose, Fasting 06/26/2023 186 (H)    • Calcium 06/26/2023 9.1    • eGFR 06/26/2023 82    • GGT 06/26/2023 772 (H)    • RF Quantitation 06/26/2023 1024 IU/mL (A)          Radiology Results:   US elastography/UGAP    Result Date: 8/19/2023  Narrative: ELASTOGRAPHY, LIVER ULTRASOUND INDICATION:   M05.9: Rheumatoid arthritis with rheumatoid factor, unspecified. COMPARISON: Ultrasound elastography from 4/6/2020. Abdomen ultrasound from 7/20/2023. TECHNIQUE: Targeted ultrasound of the liver was performed with a curvilinear transducer on a [employer] e10 utilizing 2D SWE. A total of 10 shear wave Elastography samples were obtained. FINDINGS: Shear Wave Elastography sampling was performed to evaluate stiffness changes within the liver associated with fibrosis. E1  17.89 kPa E2  14.45 kPa E3  10.01 kPa E4  11.44 kPa E5  13.21 kPa E6  14.94 kPa E7  15.96 kPa E8  13.13 kPa E9  12.27 kPa E10 11.39 kPa E median:  13.17 kPa. The corresponding Metavir score is F4 (cirrhosis), >11.87 kPa.  >1.98 m/s. IQR/median:  24.1 %. (IQR of less than 30% is considered a satisfactory data set). Note: that the stage of liver fibrosis may be overestimated by clinical conditions unrelated to fibrosis, including but not limited to, nonfasting, hepatic inflammation, vascular congestion, biliary obstruction, and infiltrative liver disease. Furthermore, in some patients with NAFLD, the cut-off values for compensated advanced chronic liver disease may be lower (7-9 kPa). In causes other than viral hepatitis and nonalcoholic fatty liver disease, the cut-off values are not well established. When comparing measurements across time, a clinically significant change should be considered when the delta change is greater than 10%. In all these conditions, however, liver stiffness values within the normal range exclude significant liver fibrosis.  Ultrasound-guided attenuation parameter (UGAP) Liver Steatosis Grading A1  0.59 dB/cm/MHz A2  0.7 dB/cm/MHz A3  0.68 dB/cm/MHz A4  0.68 dB/cm/MHz A5  0.84 dB/cm/MHz A6  0.6 dB/cm/MHz A7  0.6 dB/cm/MHz A8  0.71 dB/cm/MHz A9  0.57 dB/cm/MHz A10 0.68 dB/cm/MHz Attenuation coefficient:  0.68 dB/cm/MHz Liver steatosis grading:  S1 ( 5% - 33% steatosis) range 0.65-0.70 dB/cm/MHz IQR/Med:  14.2 % (Less than or equal to 30% is a satisfactory data set.) Reference White paper for IPXI ultrasound-guided attenuation parameter https://www.[employer]/. au/-/[email or web address]. pdf?la=en-au     Impression: Metavir score: F0 - F1, Absent or Mild Fibrosis According to the updated SRU consensus statement, a liver stiffness of 13.17 kPa, rules in compensated advanced chronic liver disease (cACLD). https://pubs. rsna.org/doi/10.1148/radiol. 4117231785 Liver steatosis grading:  S1 ( 5% - 33% steatosis) Workstation performed: [id number]       Star Antoine PA-C     **Please note: Dictation voice to text software may have been used in the creation of this record. Occasional wrong word or “sound alike” substitutions may have occurred due to the inherent limitations of voice recognition software. Read the chart carefully and recognize, using context, where substitutions have occurred. **

## 2023-08-31 NOTE — PROGRESS NOTES
[facility] - Outpatient Consultation  Oli Echavarria 59 y.o. female MRN: [id number]  Encounter: [id number]          ASSESSMENT AND PLAN:      1. Cirrhosis of liver without ascites, unspecified hepatic cirrhosis type ([address])  2. Rheumatoid arthritis with positive rheumatoid factor, involving unspecified site ([address])  3. Long term methotrexate user  3. Abnormal liver function tests    Summary: Patient is a 59 y.o. female with updated cirrhosis of unknown etiology. Cannot calculate MELD score. Patient with abnormal liver function tests, primarily a mildly elevated alkaline phosphatase and T. bili but more recently of mixed pattern, dating back to January 2022. She has not had a complete serologic evaluation negative for competing causes of liver disease. Recent ultrasound was notable for cirrhotic morphology with subsequent ultrasound notable for F4 cirrhosis. No clinical evidence of hepatic decompensation. Although she has multiple metabolic risk factors for the development of NAFLD, there is no overt evidence of fatty liver disease on imaging. Therefore, the etiology of her cirrhosis remains unclear. Plan to repeat her autoimmune serologies, particularly given her diagnosis of RA, addition to basic serologies, and AFP level for hepatoma screening and serologies to determine patient's immunity status to hep a and B. Dscussed that if her serologies are unrevealing and liver function tests are still abnormal, would recommend further discussion regarding the utility of a liver biopsy. Fortunately, her liver disease appears to be well compensated. She will complete an EGD for variceal screening and is up-to-date with imaging for hepatoma screening. Additional management as below. Ascites   - No clinical or radiographic evidence of ascites. Esophageal Varices   - EGD (7/2020) without evidence of gastric/esophageal varices.   - Ordered a repeat EGD for variceal screening today.    Hepatic Encephalopathy   - No history or clinical evidence of HE on exam today. - Patient aware of signs/sxs's of HE and advised to promptly inform provider if experiencing such. [address] Screening   - U/S (7/2023) without focal liver lesion and AFP ordered today. - Patient will be due for imaging with either an U/S or MRI in 1/2023.  - Continue imaging q6 months and AFP annually. Nutritional Status  - No sarcopenia noted on exam today. - Encouraged high-protein diet in addition to a high-protein snack qHS to prevent prolonged fasting. Vaccines   - Serologies to assess immunity to hep A and B.    - AFP tumor marker; Future  - CBC and Platelet; Future  - Comprehensive metabolic panel; Future  - Protime-INR; Future  - Antimitochondrial antibody; Future  - Anti-smooth muscle antibody, IgG; Future  - IgG, IgA, IgM; Future  - Hepatitis A antibody, total; Future  - Hepatitis B surface antibody; Future  - EGD; Future    4. Screening for colon cancer  Patient is up-to-date with CRC screening. Cologuard 2/2022 negative. Continue with Cologuard q3 years for CRC screening. Follow-up in 3 months.     ______________________________________________________________________    HPI: Patient is a 59 y.o. female who presents today for a consultation regarding    Patient is familiar to [facility] previously seen by Dr. Nieves Brought in 2020 for abnormal liver function tests noted to have hepatomegaly and slightly heterogeneous liver parenchyma on ultrasound. U/S elastography was notable for F3 fibrosis. She had a serologic evaluation negative for autoimmune hepatitis, A1AT deficiency, Denver's disease and hemochromatosis. She was noted to have a positive hepatitis B core Ab IgM but with a negative core total antibody, surface antigen, surface antibody and HBV DNA. Also HCV negative.     She has had persistently abnormal liver function tests, of mixed type with most recent hepatic function panel noting AST 64, ALT 77, alk phos 196, T. bili 1.4, and thrombocytopenia prompting an abdominal ultrasound notable for cirrhotic morphology without evidence of portal hypertension. US elastography was notable for F4 fibrosis. Denies a family history of liver disease or liver cancer. She does have autoimmune conditions including rheumatoid arthritis for which she was taking methotrexate x2 years but stopped 3 months prior. Denies EtOH use. Denies signs or symptoms of ascites, bleeding esophageal varices or hepatic encephalopathy. Denies pruritus, confusion, dark urine, bruising easily, yellowing of the eyes and/or skin, new/worsening abdominal distension or swelling of the lower extremities, abdominal pain, overt evidence of GI bleeding, or unintentional weight loss. Computed MELD 3.0 unavailable. Necessary lab results were not found in the last year. Computed MELD-Na unavailable. Necessary lab results were not found in the last year. REVIEW OF SYSTEMS:    CONSTITUTIONAL: Denies any fever, chills, rigors, and weight loss. HEENT: No earache or tinnitus. Denies hearing loss or visual disturbances. CARDIOVASCULAR: No chest pain or palpitations. RESPIRATORY: Denies any cough, hemoptysis, shortness of breath or dyspnea on exertion. GASTROINTESTINAL: As noted in the History of Present Illness. GENITOURINARY: No problems with urination. Denies any hematuria or dysuria. NEUROLOGIC: No dizziness or vertigo, denies headaches. MUSCULOSKELETAL: Denies any muscle or joint pain. SKIN: Denies skin rashes or itching. ENDOCRINE: Denies excessive thirst. Denies intolerance to heat or cold. PSYCHOSOCIAL: Denies depression or anxiety. Denies any recent memory loss.        Historical Information   Past Medical History:   Diagnosis Date   • Depression    • Diabetes ([address] [facility])    • Diabetes mellitus ([address])    • Fibromyalgia    • GERD (gastroesophageal reflux disease)    • Hypothyroid    • Pneumonia    • Shortness of breath      Past Surgical History:   Procedure Laterality Date   • KNEE CARTILAGE SURGERY Right    • TRACHEOSTOMY       Social History   Social History     Substance and Sexual Activity   Alcohol Use Yes   • Alcohol/week: 2.0 standard drinks of alcohol   • Types: 2 Glasses of wine per week     Social History     Substance and Sexual Activity   Drug Use No     Social History     Tobacco Use   Smoking Status Former   • Packs/day: 0.25   • Years: 10.00   • Total pack years: 2.50   • Types: Cigarettes   • Quit date: 1997   • Years since quittin.6   Smokeless Tobacco Never     Family History   Problem Relation Age of Onset   • Pneumonia Father    • Asthma Brother        Meds/Allergies       Current Outpatient Medications:   •  atorvastatin (LIPITOR) 20 mg tablet  •  hydroxychloroquine (PLAQUENIL) 200 mg tablet  •  metFORMIN (GLUCOPHAGE-XR) 750 mg 24 hr tablet  •  predniSONE 5 mg tablet  •  Vitamin D, Cholecalciferol, 25 MCG (1000 UT) TABS  •  ciclopirox (PENLAC) 8 % solution  •  diclofenac (VOLTAREN) 75 mg EC tablet  •  ergocalciferol (VITAMIN D2) 61,979 units  •  folic acid (FOLVITE) 1 mg tablet  •  leucovorin (WELLCOVORIN) 10 MG tablet  •  meloxicam (MOBIC) 15 mg tablet  •  methotrexate 2.5 MG tablet  •  oxyCODONE-acetaminophen (PERCOCET) 5-325 mg per tablet  •  pantoprazole (PROTONIX) 40 mg tablet  •  traMADol (ULTRAM) 50 mg tablet    Allergies   Allergen Reactions   • Penicillins    • Wellbutrin [Bupropion]            Objective     Blood pressure 124/76, temperature (!) 97.1 °F (36.2 °C), temperature source Tympanic, height 5' (1.524 m), weight 105 kg (232 lb 6.4 oz), not currently breastfeeding. Body mass index is 45.39 kg/m². PHYSICAL EXAM:      General Appearance:   Alert, cooperative, no distress; AAOx3, no asterixis   HEENT:   Normocephalic, atraumatic, anicteric;  No scleral icterus     Neck:  Supple, symmetrical, trachea midline   Lungs:   Clear to auscultation bilaterally; no rales, rhonchi or wheezing; respirations unlabored    Heart[de-identified]   Regular rate and rhythm; no murmur, rub, or gallop. Abdomen:   Soft, non-tender, non-distended; normal bowel sounds; no masses, no organomegaly    Genitalia:   Deferred    Rectal:   Deferred    Extremities:  No palmar erythema, cyanosis, clubbing or edema    Pulses:  2+ and symmetric    Skin:  No spider angiomas, jaundice, rashes, or lesions    Lymph nodes:  No palpable cervical lymphadenopathy        Lab Results:   No visits with results within 1 Day(s) from this visit.    Latest known visit with results is:   Appointment on 06/26/2023   Component Date Value   • WBC 06/26/2023 8.26    • RBC 06/26/2023 4.55    • Hemoglobin 06/26/2023 13.7    • Hematocrit 06/26/2023 41.3    • MCV 06/26/2023 91    • MCH 06/26/2023 30.1    • MCHC 06/26/2023 33.2    • RDW 06/26/2023 12.4    • Platelets 46/01/8224 88 (L)    • nRBC 06/26/2023 0    • Neutrophils Relative 06/26/2023 71    • Immat GRANS % 06/26/2023 0    • Lymphocytes Relative 06/26/2023 16    • Monocytes Relative 06/26/2023 10    • Eosinophils Relative 06/26/2023 2    • Basophils Relative 06/26/2023 1    • Neutrophils Absolute 06/26/2023 5.85    • Immature Grans Absolute 06/26/2023 0.02    • Lymphocytes Absolute 06/26/2023 1.29    • Monocytes Absolute 06/26/2023 0.84    • Eosinophils Absolute 06/26/2023 0.18    • Basophils Absolute 06/26/2023 0.08    • Sed Rate 06/26/2023 29    • Rheumatoid Factor 06/26/2023 Positive (A)    • Total Bilirubin 06/26/2023 1.47 (H)    • Bilirubin, Direct 06/26/2023 0.55 (H)    • Alkaline Phosphatase 06/26/2023 196 (H)    • AST 06/26/2023 64 (H)    • ALT 06/26/2023 77    • Total Protein 06/26/2023 7.1    • Albumin 06/26/2023 3.3 (L)    • C3 Complement 06/26/2023 139.0    • C4, COMPLEMENT 06/26/2023 25.0    • CRP 06/26/2023 <3.0    • Sodium 06/26/2023 139    • Potassium 06/26/2023 3.8    • Chloride 06/26/2023 108    • CO2 06/26/2023 26    • ANION GAP 06/26/2023 5    • BUN 06/26/2023 18    • Creatinine 06/26/2023 0.78    • Glucose, Fasting 06/26/2023 186 (H)    • Calcium 06/26/2023 9.1    • eGFR 06/26/2023 82    • GGT 06/26/2023 772 (H)    • RF Quantitation 06/26/2023 1024 IU/mL (A)          Radiology Results:   US elastography/UGAP    Result Date: 8/19/2023  Narrative: ELASTOGRAPHY, LIVER ULTRASOUND INDICATION:   M05.9: Rheumatoid arthritis with rheumatoid factor, unspecified. COMPARISON: Ultrasound elastography from 4/6/2020. Abdomen ultrasound from 7/20/2023. TECHNIQUE: Targeted ultrasound of the liver was performed with a curvilinear transducer on a Pro[employer] e10 utilizing 2D SWE. A total of 10 shear wave Elastography samples were obtained. FINDINGS: Shear Wave Elastography sampling was performed to evaluate stiffness changes within the liver associated with fibrosis. E1  17.89 kPa E2  14.45 kPa E3  10.01 kPa E4  11.44 kPa E5  13.21 kPa E6  14.94 kPa E7  15.96 kPa E8  13.13 kPa E9  12.27 kPa E10 11.39 kPa E median:  13.17 kPa. The corresponding Metavir score is F4 (cirrhosis), >11.87 kPa.  >1.98 m/s. IQR/median:  24.1 %. (IQR of less than 30% is considered a satisfactory data set). Note: that the stage of liver fibrosis may be overestimated by clinical conditions unrelated to fibrosis, including but not limited to, nonfasting, hepatic inflammation, vascular congestion, biliary obstruction, and infiltrative liver disease. Furthermore, in some patients with NAFLD, the cut-off values for compensated advanced chronic liver disease may be lower (7-9 kPa). In causes other than viral hepatitis and nonalcoholic fatty liver disease, the cut-off values are not well established. When comparing measurements across time, a clinically significant change should be considered when the delta change is greater than 10%. In all these conditions, however, liver stiffness values within the normal range exclude significant liver fibrosis.  Ultrasound-guided attenuation parameter (UGAP) Liver Steatosis Grading A1  0.59 dB/cm/MHz A2  0.7 dB/cm/MHz A3  0.68 dB/cm/MHz A4  0.68 dB/cm/MHz A5  0.84 dB/cm/MHz A6  0.6 dB/cm/MHz A7  0.6 dB/cm/MHz A8  0.71 dB/cm/MHz A9  0.57 dB/cm/MHz A10 0.68 dB/cm/MHz Attenuation coefficient:  0.68 dB/cm/MHz Liver steatosis grading:  S1 ( 5% - 33% steatosis) range 0.65-0.70 dB/cm/MHz IQR/Med:  14.2 % (Less than or equal to 30% is a satisfactory data set.) Reference White paper for [employer] ultrasound-guided attenuation parameter https://www.[employer]/. au/-/[email or web address]. pdf?la=en-au     Impression: Metavir score: F0 - F1, Absent or Mild Fibrosis According to the updated SRU consensus statement, a liver stiffness of 13.17 kPa, rules in compensated advanced chronic liver disease (cACLD). https://pubs. rsna.org/doi/10.1148/radiol. 8074024324 Liver steatosis grading:  S1 ( 5% - 33% steatosis) Workstation performed: [id number]       Yoselin Smith PA-C     **Please note: Dictation voice to text software may have been used in the creation of this record. Occasional wrong word or “sound alike” substitutions may have occurred due to the inherent limitations of voice recognition software. Read the chart carefully and recognize, using context, where substitutions have occurred. **

## 2023-09-05 NOTE — PATIENT INSTRUCTIONS
Scheduled date of EGD(as of today): 9/13/23  Physician performing EGD: Aliyah Hummel  Location of EGD: [facility]  Instructions reviewed with patient by: Karolyn Larson  Clearances: N/A

## 2023-09-07 ENCOUNTER — RA CDI HCC (OUTPATIENT)
Dept: OTHER | Facility: HOSPITAL | Age: 63
End: 2023-09-07

## 2023-09-07 NOTE — PROGRESS NOTES
Recurrent major depression in partial remission ([address] )  [F33.41]        [address] [facility] coding opportunities          Chart Reviewed number of suggestions sent to Provider: 2     Patients Insurance        Commercial Insurance: Schuyler Perdomo

## 2023-09-10 ENCOUNTER — APPOINTMENT (OUTPATIENT)
Dept: LAB | Facility: HOSPITAL | Age: 63
End: 2023-09-10
Payer: COMMERCIAL

## 2023-09-10 DIAGNOSIS — K74.60 CIRRHOSIS OF LIVER WITHOUT ASCITES, UNSPECIFIED HEPATIC CIRRHOSIS TYPE (HCC): ICD-10-CM

## 2023-09-10 LAB
AFP-TM SERPL-MCNC: 5.47 NG/ML (ref 0–9)
ALBUMIN SERPL BCP-MCNC: 3.9 G/DL (ref 3.5–5)
ALP SERPL-CCNC: 124 U/L (ref 34–104)
ALT SERPL W P-5'-P-CCNC: 39 U/L (ref 7–52)
ANION GAP SERPL CALCULATED.3IONS-SCNC: 10 MMOL/L
AST SERPL W P-5'-P-CCNC: 42 U/L (ref 13–39)
BILIRUB SERPL-MCNC: 1.38 MG/DL (ref 0.2–1)
BUN SERPL-MCNC: 15 MG/DL (ref 5–25)
CALCIUM SERPL-MCNC: 9.3 MG/DL (ref 8.4–10.2)
CHLORIDE SERPL-SCNC: 106 MMOL/L (ref 96–108)
CO2 SERPL-SCNC: 21 MMOL/L (ref 21–32)
CREAT SERPL-MCNC: 0.54 MG/DL (ref 0.6–1.3)
ERYTHROCYTE [DISTWIDTH] IN BLOOD BY AUTOMATED COUNT: 12.7 % (ref 11.6–15.1)
GFR SERPL CREATININE-BSD FRML MDRD: 101 ML/MIN/1.73SQ M
GLUCOSE P FAST SERPL-MCNC: 143 MG/DL (ref 65–99)
HCT VFR BLD AUTO: 44.3 % (ref 34.8–46.1)
HGB BLD-MCNC: 14.5 G/DL (ref 11.5–15.4)
IGA SERPL-MCNC: 281 MG/DL (ref 66–433)
IGG SERPL-MCNC: 854 MG/DL (ref 635–1741)
IGM SERPL-MCNC: 59 MG/DL (ref 45–281)
INR PPP: 1.1 (ref 0.84–1.19)
MCH RBC QN AUTO: 29.5 PG (ref 26.8–34.3)
MCHC RBC AUTO-ENTMCNC: 32.7 G/DL (ref 31.4–37.4)
MCV RBC AUTO: 90 FL (ref 82–98)
PLATELET # BLD AUTO: 68 THOUSANDS/UL (ref 149–390)
POTASSIUM SERPL-SCNC: 3.6 MMOL/L (ref 3.5–5.3)
PROT SERPL-MCNC: 6.7 G/DL (ref 6.4–8.4)
PROTHROMBIN TIME: 14.2 SECONDS (ref 11.6–14.5)
RBC # BLD AUTO: 4.92 MILLION/UL (ref 3.81–5.12)
SODIUM SERPL-SCNC: 137 MMOL/L (ref 135–147)
WBC # BLD AUTO: 7.52 THOUSAND/UL (ref 4.31–10.16)

## 2023-09-10 PROCEDURE — 86015 ACTIN ANTIBODY EACH: CPT

## 2023-09-10 PROCEDURE — 36415 COLL VENOUS BLD VENIPUNCTURE: CPT

## 2023-09-10 PROCEDURE — 85027 COMPLETE CBC AUTOMATED: CPT

## 2023-09-10 PROCEDURE — 86706 HEP B SURFACE ANTIBODY: CPT

## 2023-09-10 PROCEDURE — 86381 MITOCHONDRIAL ANTIBODY EACH: CPT

## 2023-09-10 PROCEDURE — 86708 HEPATITIS A ANTIBODY: CPT

## 2023-09-10 PROCEDURE — 82784 ASSAY IGA/IGD/IGG/IGM EACH: CPT

## 2023-09-10 PROCEDURE — 80053 COMPREHEN METABOLIC PANEL: CPT

## 2023-09-10 PROCEDURE — 85610 PROTHROMBIN TIME: CPT

## 2023-09-10 PROCEDURE — 82105 ALPHA-FETOPROTEIN SERUM: CPT

## 2023-09-11 LAB
HAV AB SER QL IA: NORMAL
HBV SURFACE AB SER-ACNC: <3 MIU/ML

## 2023-09-12 LAB
ACTIN IGG SERPL-ACNC: 9 UNITS (ref 0–19)
MITOCHONDRIA M2 IGG SER-ACNC: <20 UNITS (ref 0–20)

## 2023-09-13 ENCOUNTER — ANESTHESIA EVENT (OUTPATIENT)
Dept: GASTROENTEROLOGY | Facility: HOSPITAL | Age: 63
End: 2023-09-13

## 2023-09-13 ENCOUNTER — HOSPITAL ENCOUNTER (OUTPATIENT)
Dept: GASTROENTEROLOGY | Facility: HOSPITAL | Age: 63
Setting detail: OUTPATIENT SURGERY
Discharge: HOME | End: 2023-09-13
Admitting: STUDENT IN AN ORGANIZED HEALTH CARE EDUCATION/TRAINING PROGRAM
Payer: COMMERCIAL

## 2023-09-13 ENCOUNTER — ANESTHESIA (OUTPATIENT)
Dept: GASTROENTEROLOGY | Facility: HOSPITAL | Age: 63
End: 2023-09-13

## 2023-09-13 VITALS
RESPIRATION RATE: 17 BRPM | HEART RATE: 68 BPM | OXYGEN SATURATION: 99 % | TEMPERATURE: 97.2 F | SYSTOLIC BLOOD PRESSURE: 140 MMHG | DIASTOLIC BLOOD PRESSURE: 62 MMHG

## 2023-09-13 DIAGNOSIS — K74.60 CIRRHOSIS OF LIVER WITHOUT ASCITES, UNSPECIFIED HEPATIC CIRRHOSIS TYPE (HCC): ICD-10-CM

## 2023-09-13 DIAGNOSIS — I85.00 ESOPHAGEAL VARICES DETERMINED BY ENDOSCOPY (HCC): Primary | ICD-10-CM

## 2023-09-13 LAB — GLUCOSE SERPL-MCNC: 150 MG/DL (ref 65–140)

## 2023-09-13 PROCEDURE — 82948 REAGENT STRIP/BLOOD GLUCOSE: CPT

## 2023-09-13 RX ORDER — KETAMINE HCL IN NACL, ISO-OSM 100MG/10ML
SYRINGE (ML) INJECTION AS NEEDED
Status: DISCONTINUED | OUTPATIENT
Start: 2023-09-13 | End: 2023-09-13

## 2023-09-13 RX ORDER — CARVEDILOL 6.25 MG/1
6.25 TABLET ORAL 2 TIMES DAILY WITH MEALS
Qty: 180 TABLET | Refills: 3 | Status: SHIPPED | OUTPATIENT
Start: 2023-09-13 | End: 2024-09-12

## 2023-09-13 RX ORDER — PROPOFOL 10 MG/ML
INJECTION, EMULSION INTRAVENOUS AS NEEDED
Status: DISCONTINUED | OUTPATIENT
Start: 2023-09-13 | End: 2023-09-13

## 2023-09-13 RX ORDER — SODIUM CHLORIDE, SODIUM LACTATE, POTASSIUM CHLORIDE, CALCIUM CHLORIDE 600; 310; 30; 20 MG/100ML; MG/100ML; MG/100ML; MG/100ML
INJECTION, SOLUTION INTRAVENOUS CONTINUOUS PRN
Status: DISCONTINUED | OUTPATIENT
Start: 2023-09-13 | End: 2023-09-13

## 2023-09-13 RX ORDER — LIDOCAINE HYDROCHLORIDE 10 MG/ML
INJECTION, SOLUTION EPIDURAL; INFILTRATION; INTRACAUDAL; PERINEURAL AS NEEDED
Status: DISCONTINUED | OUTPATIENT
Start: 2023-09-13 | End: 2023-09-13

## 2023-09-13 RX ORDER — GLYCOPYRROLATE 0.2 MG/ML
INJECTION INTRAMUSCULAR; INTRAVENOUS AS NEEDED
Status: DISCONTINUED | OUTPATIENT
Start: 2023-09-13 | End: 2023-09-13

## 2023-09-13 RX ADMIN — TOPICAL ANESTHETIC 1 SPRAY: 200 SPRAY DENTAL; PERIODONTAL at 10:50

## 2023-09-13 RX ADMIN — Medication 20 MG: at 14:40

## 2023-09-13 RX ADMIN — PROPOFOL 80 MG: 10 INJECTION, EMULSION INTRAVENOUS at 14:40

## 2023-09-13 RX ADMIN — PROPOFOL 20 MG: 10 INJECTION, EMULSION INTRAVENOUS at 14:41

## 2023-09-13 RX ADMIN — PROPOFOL 20 MG: 10 INJECTION, EMULSION INTRAVENOUS at 14:44

## 2023-09-13 RX ADMIN — PROPOFOL 20 MG: 10 INJECTION, EMULSION INTRAVENOUS at 14:42

## 2023-09-13 RX ADMIN — PROPOFOL 20 MG: 10 INJECTION, EMULSION INTRAVENOUS at 14:46

## 2023-09-13 RX ADMIN — SODIUM CHLORIDE, SODIUM LACTATE, POTASSIUM CHLORIDE, AND CALCIUM CHLORIDE: .6; .31; .03; .02 INJECTION, SOLUTION INTRAVENOUS at 10:28

## 2023-09-13 RX ADMIN — LIDOCAINE HYDROCHLORIDE 100 MG: 10 INJECTION, SOLUTION EPIDURAL; INFILTRATION; INTRACAUDAL at 14:38

## 2023-09-13 RX ADMIN — GLYCOPYRROLATE 0.1 MG: 0.2 INJECTION, SOLUTION INTRAMUSCULAR; INTRAVENOUS at 10:50

## 2023-09-13 NOTE — INTERVAL H&P NOTE
H&P reviewed. After examining the patient I find no changes in the patients condition since the H&P had been written. Vitals:    09/13/23 0912   BP: 153/69   Pulse: 67   Resp: 20   Temp: (!) 97 °F (36.1 °C)   SpO2: 98%     Informed consent was obtained for the procedure. Risks of infection, perforation and hemorrhage were discussed. The patient was agreeable to proceed with the procedure.

## 2023-09-13 NOTE — ANESTHESIA POSTPROCEDURE EVALUATION
Post-Op Assessment Note    CV Status:  Stable    Pain management: adequate     Mental Status:  Awake and lethargic   Hydration Status:  Stable   PONV Controlled:  None   Airway Patency:  Patent      Post Op Vitals Reviewed: Yes      Staff: CRNA         No notable events documented.     BP      Temp     Pulse     Resp      SpO2

## 2023-09-13 NOTE — ANESTHESIA PREPROCEDURE EVALUATION
Procedure:  EGD    Relevant Problems   CARDIO   (+) Familial hypercholesterolemia      ENDO   (+) Hypothyroid      GI/HEPATIC   (+) Gastroesophageal reflux disease without esophagitis   (+) LERMA (nonalcoholic steatohepatitis)      MUSCULOSKELETAL   (+) Fibromyalgia   (+) Rheumatoid arthritis involving multiple sites with positive rheumatoid factor (HCC)      NEURO/PSYCH   (+) Fibromyalgia   (+) Recurrent major depression in partial remission (HCC)      PULMONARY   (+) Pneumonia        Physical Exam    Airway    Mallampati score: II  TM Distance: >3 FB  Neck ROM: full     Dental       Cardiovascular      Pulmonary      Other Findings        Anesthesia Plan  ASA Score- 3     Anesthesia Type- IV sedation with anesthesia with ASA Monitors. Additional Monitors:   Airway Plan:           Plan Factors-Exercise tolerance (METS): >4 METS. Chart reviewed. Patient is not a current smoker. Patient did not smoke on day of surgery. Obstructive sleep apnea risk education given perioperatively. Induction- intravenous. Postoperative Plan-     Informed Consent- Anesthetic plan and risks discussed with patient. I personally reviewed this patient with the CRNA. Discussed and agreed on the Anesthesia Plan with the CRNA. .        NPO appropriate. Discussed benefits/risks of monitored anesthetic care and discussed providing a dynamic level of mild to deep sedation. Risks include awareness, airway obstruction, aspiration which may necessitate conversion to general anesthesia. All questions answered. Patient understands and wishes to proceed. Anesthesia plan and consent discussed with Shirley Connor who expressed understanding and agreement. Risks/benefits and alternatives discussed with patient including possible PONV, sore throat, damage to teeth/lips/gums and possibility of rare anesthetic and surgical emergencies.

## 2023-09-13 NOTE — DISCHARGE INSTRUCTIONS
Upper Endoscopy   WHAT YOU NEED TO KNOW:   An upper endoscopy is also called an upper gastrointestinal (GI) endoscopy, or an esophagogastroduodenoscopy (EGD). You may feel bloated, gassy, or have some abdominal discomfort after your procedure. Your throat may be sore for 24 to 36 hours. You may burp or pass gas from air that is still inside your body. DISCHARGE INSTRUCTIONS:      Seek care immediately if:   You feel dizzy or faint. You have sudden chest pain or trouble breathing. You have a large amount of bright red blood in your bowel movements. Your abdomen is hard and firm and you have severe pain. You vomit blood. Contact your healthcare provider if:   You feel full or bloated and cannot burp or pass gas. You have not had a bowel movement for 3 days after your procedure. You have neck pain. You have a fever or chills. You have nausea or are vomiting. You have a rash or hives. You have questions or concerns about your endoscopy. Relieve a sore throat:  Suck on throat lozenges or crushed ice. Gargle with a small amount of warm salt water. Mix 1 teaspoon of salt and 1 cup of warm water to make salt water. Relieve gas and discomfort from bloating:  Lie on your right side with a heating pad on your abdomen. Take short walks to help pass gas. Eat small meals until bloating is relieved. Rest after your procedure:  Do not drive or make important decisions until the day after your procedure. Return to your normal activity as directed. You can usually return to work the day after your procedure. Follow up with your healthcare provider as directed:  Write down your questions so you remember to ask them during your visits.

## 2023-09-15 ENCOUNTER — TELEPHONE (OUTPATIENT)
Dept: INTERNAL MEDICINE CLINIC | Facility: CLINIC | Age: 63
End: 2023-09-15

## 2023-09-15 NOTE — TELEPHONE ENCOUNTER
----- Message from Angie Mata DO sent at 9/13/2023  2:56 PM EDT -----  Patient needs to come in for a followup, please schedule her over the next couple weeks

## 2023-09-19 ENCOUNTER — NURSE TRIAGE (OUTPATIENT)
Age: 63
End: 2023-09-19

## 2023-09-19 NOTE — TELEPHONE ENCOUNTER
Patients  calling in, he would like to know if there are any pain medications patient can take for her severe rheumatoid arthritis that is safe for her liver. In the past patient was on mobic, tramadol and percocet but when they were told about her cirrhosis they were told to stop all medications.    Please advise

## 2023-09-20 NOTE — TELEPHONE ENCOUNTER
LVM for patient explaining that she can take up to 2g of Tylenol a day since the Nsaids and mobic are not good for a patient with cirrhosis. Also advised patient to speak with her endocrinologist in regards to other pain relievers. Asked patient to call our office if she has any other questions.

## 2023-09-22 ENCOUNTER — TELEPHONE (OUTPATIENT)
Age: 63
End: 2023-09-22

## 2023-09-22 DIAGNOSIS — K74.60 CIRRHOSIS OF LIVER WITHOUT ASCITES, UNSPECIFIED HEPATIC CIRRHOSIS TYPE (HCC): Primary | ICD-10-CM

## 2023-09-22 NOTE — TELEPHONE ENCOUNTER
Reason for call:   [] Refill   [] Prior Auth  [x] Other:     Pt states Dr. Vilchis Friends requested she have 2 vaccines Hep A and Hep B done ASAP. She would like to know if a script for both vaccines can be sent to her pharm.     She is requesting office staff contact her at [phone] to discuss

## 2023-09-22 NOTE — TELEPHONE ENCOUNTER
Advised pt to contact PCP office for vaccinations and if she would still like scripts to be sent to pharmacy, please confirm pharmacy she would like them sent to.

## 2023-09-26 ENCOUNTER — TELEPHONE (OUTPATIENT)
Dept: GASTROENTEROLOGY | Facility: CLINIC | Age: 63
End: 2023-09-26

## 2023-09-26 NOTE — TELEPHONE ENCOUNTER
Left voicemail for patient to call office. Ramesh Garcia,      Good news! Your liver function tests have slightly improved. Your labs are also negative for autoimmune hepatitis. It does not appear that you are immune to hepatitis A or B and it is recommended that you complete the vaccination series for both either through your primary care provider or local pharmacy. Otherwise, the remainder of your labs are stable or within normal limits.  Please feel free to reach out with any questions or concerns.      David,   [facility]

## 2023-10-16 ENCOUNTER — TELEPHONE (OUTPATIENT)
Dept: GASTROENTEROLOGY | Facility: CLINIC | Age: 63
End: 2023-10-16

## 2023-10-16 NOTE — TELEPHONE ENCOUNTER
Patients GI provider:  Dr. Miles Mosher    Number to return call: [phone]    Reason for call: Pt  called stating pt's RA is becoming very painful tylenol is not working. Pt has called pain management, RA doctor and has not gotten any answers on what she can take for pain that will not further damage her liver. Pt's  is turning to Dr. Miles Mosher for advice on what pt can take for her pain. Pt  states pt normally was taking percocet and tramadol. Please advise.     Scheduled procedure/appointment date if applicable:

## 2023-10-18 ENCOUNTER — TELEPHONE (OUTPATIENT)
Age: 63
End: 2023-10-18

## 2023-10-18 NOTE — TELEPHONE ENCOUNTER
Spoke with patient's  Janett Nelson (HIPAA approved). I explained to him all of Corin Gayle's recommendations for patient's pain. Advised him to call our office if recommendations are not effective or if they have any other questions.

## 2023-10-18 NOTE — TELEPHONE ENCOUNTER
Patients GI provider:  Dr. Honey Salgado    Number to return call: [phone]     Reason for call: Pt spouse Luis Manuel Aleja call regarding pain medication for the pt. He stated that the pt was taking off her previous pain medication and was instructed the take Tylenol and to see pain management specialist. Pt spouse stated that neither one is helpful and he requesting some pain medication for the pt. Pt spouse would like to speak to someone regarding this matter.     Scheduled procedure/appointment date if applicable: [phone]

## 2023-10-24 ENCOUNTER — VBI (OUTPATIENT)
Dept: ADMINISTRATIVE | Facility: OTHER | Age: 63
End: 2023-10-24

## 2023-11-16 ENCOUNTER — HOSPITAL ENCOUNTER (EMERGENCY)
Facility: HOSPITAL | Age: 63
Discharge: HOME | End: 2023-11-16
Attending: EMERGENCY MEDICINE
Payer: COMMERCIAL

## 2023-11-16 ENCOUNTER — APPOINTMENT (EMERGENCY)
Dept: RADIOLOGY | Facility: HOSPITAL | Age: 63
End: 2023-11-16
Payer: COMMERCIAL

## 2023-11-16 VITALS
WEIGHT: 236.55 LBS | RESPIRATION RATE: 18 BRPM | HEART RATE: 58 BPM | BODY MASS INDEX: 46.2 KG/M2 | SYSTOLIC BLOOD PRESSURE: 121 MMHG | OXYGEN SATURATION: 97 % | TEMPERATURE: 97.8 F | DIASTOLIC BLOOD PRESSURE: 58 MMHG

## 2023-11-16 DIAGNOSIS — J40 BRONCHITIS: Primary | ICD-10-CM

## 2023-11-16 LAB
FLUAV RNA RESP QL NAA+PROBE: NEGATIVE
FLUBV RNA RESP QL NAA+PROBE: NEGATIVE
RSV RNA RESP QL NAA+PROBE: NEGATIVE
SARS-COV-2 RNA RESP QL NAA+PROBE: NEGATIVE

## 2023-11-16 PROCEDURE — 0241U HB NFCT DS VIR RESP RNA 4 TRGT: CPT | Performed by: EMERGENCY MEDICINE

## 2023-11-16 PROCEDURE — 99284 EMERGENCY DEPT VISIT MOD MDM: CPT | Performed by: EMERGENCY MEDICINE

## 2023-11-16 PROCEDURE — 99283 EMERGENCY DEPT VISIT LOW MDM: CPT

## 2023-11-16 PROCEDURE — 71046 X-RAY EXAM CHEST 2 VIEWS: CPT

## 2023-11-16 RX ORDER — AZITHROMYCIN 250 MG/1
TABLET, FILM COATED ORAL
Qty: 6 TABLET | Refills: 0 | Status: SHIPPED | OUTPATIENT
Start: 2023-11-16 | End: 2023-11-20

## 2023-11-16 RX ORDER — ALBUTEROL SULFATE 90 UG/1
2 AEROSOL, METERED RESPIRATORY (INHALATION) EVERY 4 HOURS PRN
Qty: 6.7 G | Refills: 0 | Status: SHIPPED | OUTPATIENT
Start: 2023-11-16

## 2023-11-16 NOTE — ED PROVIDER NOTES
History  Chief Complaint   Patient presents with    Cough     Pt has cough, weakness for over a wk, increased mucus production and pink tinged sputum. Hx varices      60 yo F presenting for evaluation of 1 week of URI sx/cough. Pt reports cough, occasionally productive of green sputum. Nasal congestion/rhinorrhea. Taking Mucinex/Nyquil   sick with similar sx    Denies fevers, chills, CP, abdominal pain, weakness            Prior to Admission Medications   Prescriptions Last Dose Informant Patient Reported? Taking?    Vitamin D, Cholecalciferol, 25 MCG (1000 UT) TABS  Self No No   Sig: Take 2 tablets (2,000 Units total) by mouth daily   atorvastatin (LIPITOR) 20 mg tablet   No No   Sig: take 1 tablet by mouth once daily   carvedilol (COREG) 6.25 mg tablet   No No   Sig: Take 1 tablet (6.25 mg total) by mouth 2 (two) times a day with meals   ciclopirox (PENLAC) 8 % solution  Self No No   Sig: Apply topically daily at bedtime   diclofenac (VOLTAREN) 75 mg EC tablet  Self No No   Sig: Take 1 tablet (75 mg total) by mouth 2 (two) times a day PRN   ergocalciferol (VITAMIN D2) 50,000 units   No No   Sig: Take 1 capsule (50,000 Units total) by mouth every 28 days for 3 days   hydroxychloroquine (PLAQUENIL) 200 mg tablet  Self Yes No   Sig: Take 200 mg by mouth 2 (two) times a day with meals   meloxicam (MOBIC) 15 mg tablet  Self No No   Sig: Take 1 tablet (15 mg total) by mouth daily   metFORMIN (GLUCOPHAGE-XR) 750 mg 24 hr tablet  Self No No   Sig: Take 1 tablet (750 mg total) by mouth daily with breakfast   oxyCODONE-acetaminophen (PERCOCET) 5-325 mg per tablet  Self No No   Sig: Take 1 tablet by mouth every 6 (six) hours as needed for moderate painMax Daily Amount: 4 tablets   pantoprazole (PROTONIX) 40 mg tablet  Self No No   Sig: Take 1 tablet (40 mg total) by mouth 2 (two) times a day   predniSONE 5 mg tablet  Self Yes No   traMADol (ULTRAM) 50 mg tablet  Self No No   Sig: Take 1 tablet (50 mg total) by mouth every 8 (eight) hours as needed for severe pain      Facility-Administered Medications: None       Past Medical History:   Diagnosis Date    Depression     Diabetes ([address])     Diabetes mellitus ([address])     Fibromyalgia     GERD (gastroesophageal reflux disease)     Hypothyroid     Pneumonia     Shortness of breath        Past Surgical History:   Procedure Laterality Date    KNEE CARTILAGE SURGERY Right     TRACHEOSTOMY         Family History   Problem Relation Age of Onset    Pneumonia Father     Asthma Brother      I have reviewed and agree with the history as documented. E-Cigarette/Vaping    E-Cigarette Use Never User      E-Cigarette/Vaping Substances    Nicotine No     THC No     CBD No     Flavoring No     Other No     Unknown No      Social History     Tobacco Use    Smoking status: Former     Packs/day: 0.25     Years: 10.00     Total pack years: 2.50     Types: Cigarettes     Quit date: 1997     Years since quittin.8    Smokeless tobacco: Never   Vaping Use    Vaping Use: Never used   Substance Use Topics    Alcohol use: Yes     Alcohol/week: 2.0 standard drinks of alcohol     Types: 2 Glasses of wine per week    Drug use: No       Review of Systems    Physical Exam  Physical Exam  Vitals and nursing note reviewed. Constitutional:       General: She is not in acute distress. Appearance: Normal appearance. She is well-developed. HENT:      Head: Normocephalic and atraumatic. Nose: Nose normal.   Eyes:      Extraocular Movements: Extraocular movements intact. Conjunctiva/sclera: Conjunctivae normal.   Cardiovascular:      Rate and Rhythm: Normal rate and regular rhythm. Heart sounds: Normal heart sounds. Pulmonary:      Effort: Pulmonary effort is normal. No respiratory distress. Breath sounds: Normal breath sounds. Chest:      Chest wall: No tenderness. Abdominal:      General: There is no distension. Palpations: Abdomen is soft. Tenderness:  There is no abdominal tenderness. There is no guarding or rebound. Musculoskeletal:         General: No swelling, tenderness or deformity. Cervical back: Normal range of motion and neck supple. Skin:     General: Skin is warm and dry. Findings: No rash. Neurological:      General: No focal deficit present. Mental Status: She is alert and oriented to person, place, and time. Motor: No abnormal muscle tone. Coordination: Coordination normal.   Psychiatric:         Thought Content: Thought content normal.         Judgment: Judgment normal.         Vital Signs  ED Triage Vitals [11/16/23 1417]   Temperature Pulse Respirations Blood Pressure SpO2   97.8 °F (36.6 °C) 58 18 121/58 97 %      Temp Source Heart Rate Source Patient Position - Orthostatic VS BP Location FiO2 (%)   Oral Monitor Sitting Right arm --      Pain Score       8           Vitals:    11/16/23 1417   BP: 121/58   Pulse: 58   Patient Position - Orthostatic VS: Sitting         Visual Acuity      ED Medications  Medications - No data to display    Diagnostic Studies  Results Reviewed       Procedure Component Value Units Date/Time    FLU/RSV/COVID - if FLU/RSV clinically relevant [[id number]]  (Normal) Collected: 11/16/23 1459    Lab Status: Final result Specimen: Nares from Nose Updated: 11/16/23 1546     SARS-CoV-2 Negative     INFLUENZA A PCR Negative     INFLUENZA B PCR Negative     RSV PCR Negative    Narrative:      FOR PEDIATRIC PATIENTS - copy/paste COVID Guidelines URL to browser: [email or web address] [email or web address]    SARS-CoV-2 assay is a Nucleic Acid Amplification assay intended for the  qualitative detection of nucleic acid from SARS-CoV-2 in nasopharyngeal  swabs. Results are for the presumptive identification of SARS-CoV-2 RNA.     Positive results are indicative of infection with SARS-CoV-2, the virus  causing COVID-19, but do not rule out bacterial infection or co-infection  with other viruses. Laboratories within the [facility] and its  territories are required to report all positive results to the appropriate  public health authorities. Negative results do not preclude SARS-CoV-2  infection and should not be used as the sole basis for treatment or other  patient management decisions. Negative results must be combined with  clinical observations, patient history, and epidemiological information. This test has not been FDA cleared or approved. This test has been authorized by FDA under an Emergency Use Authorization  (EUA). This test is only authorized for the duration of time the  declaration that circumstances exist justifying the authorization of the  emergency use of an in vitro diagnostic tests for detection of SARS-CoV-2  virus and/or diagnosis of COVID-19 infection under section 564(b)(1) of  the Act, 21 U. S.C. 345UAR-2(Y)(7), unless the authorization is terminated  or revoked sooner. The test has been validated but independent review by FDA  and CLIA is pending. Test performed using [employer] Gene[employer]pert: This RT-PCR assay targets N2,  a region unique to SARS-CoV-2. A conserved region in the E-gene was chosen  for pan-Sarbecovirus detection which includes SARS-CoV-2. According to CMS-2020-01-R, this platform meets the definition of high-throughput technology. XR chest 2 views   Final Result by Alex Adame MD (11/16 1655)      No acute cardiopulmonary disease. Workstation performed: [id number]                CXR independently interpreted by myself: no acute abn     Procedures  Procedures         ED Course                               SBIRT 20yo+      Flowsheet Row Most Recent Value   Initial Alcohol Screen: US AUDIT-C     1. How often do you have a drink containing alcohol? 0 Filed at: 11/16/2023 1418   2. How many drinks containing alcohol do you have on a typical day you are drinking? 0 Filed at: 11/16/2023 1418   3b.  FEMALE Any Age, or MALE 65+: How often do you have 4 or more drinks on one occassion? 0 Filed at: 11/16/2023 1418   Audit-C Score 0 Filed at: 11/16/2023 1418   COLLIN: How many times in the past year have you. .. Used an illegal drug or used a prescription medication for non-medical reasons? Never Filed at: 11/16/2023 1418                      Medical Decision Making  60 yo F presenting for evaluation of 1 week of cough/URI sx- CXR to r/o PNA, COVID/Flu/RSV testing    Discharged with sx management  Discussed PCP f/u and close return precautons    Amount and/or Complexity of Data Reviewed  Labs: ordered. Decision-making details documented in ED Course. Radiology: ordered and independent interpretation performed. Decision-making details documented in ED Course. Risk  Prescription drug management. Disposition  Final diagnoses:   Bronchitis     Time reflects when diagnosis was documented in both MDM as applicable and the Disposition within this note       Time User Action Codes Description Comment    11/16/2023  3:43 PM En Willams Add [J40] Bronchitis           ED Disposition       ED Disposition   Discharge    Condition   Stable    Date/Time   Thu Nov 16, 2023 [address] discharge to home/self care. Follow-up Information       Follow up With Specialties Details Why [address], DO Internal Medicine   [address] [place] [address]  [phone]              Discharge Medication List as of 11/16/2023  3:45 PM        START taking these medications    Details   albuterol (Proventil HFA) 90 mcg/act inhaler Inhale 2 puffs every 4 (four) hours as needed for wheezing, Starting Thu 11/16/2023, Print      azithromycin (Zithromax Z-Sen) 250 mg tablet Take 2 tablets today then 1 tablet daily x 4 days, Print           CONTINUE these medications which have NOT CHANGED    Details   atorvastatin (LIPITOR) 20 mg tablet take 1 tablet by mouth once daily, Starting Tue 9/19/2023, Normal      carvedilol (COREG) 6.25 mg tablet Take 1 tablet (6.25 mg total) by mouth 2 (two) times a day with meals, Starting Wed 9/13/2023, Until Thu 9/12/2024, Normal      ciclopirox (PENLAC) 8 % solution Apply topically daily at bedtime, Starting Tue 1/25/2022, Normal      diclofenac (VOLTAREN) 75 mg EC tablet Take 1 tablet (75 mg total) by mouth 2 (two) times a day PRN, Starting Fri 4/16/2021, Normal      ergocalciferol (VITAMIN D2) 50,000 units Take 1 capsule (50,000 Units total) by mouth every 28 days for 3 days, Starting Tue 1/14/2020, Until Mon 7/6/2020, Normal      hydroxychloroquine (PLAQUENIL) 200 mg tablet Take 200 mg by mouth 2 (two) times a day with meals, Historical Med      meloxicam (MOBIC) 15 mg tablet Take 1 tablet (15 mg total) by mouth daily, Starting Fri 6/2/2023, Normal      metFORMIN (GLUCOPHAGE-XR) 750 mg 24 hr tablet Take 1 tablet (750 mg total) by mouth daily with breakfast, Starting Tue 8/15/2023, Normal      oxyCODONE-acetaminophen (PERCOCET) 5-325 mg per tablet Take 1 tablet by mouth every 6 (six) hours as needed for moderate painMax Daily Amount: 4 tablets, Starting Tue 3/30/2021, Normal      pantoprazole (PROTONIX) 40 mg tablet Take 1 tablet (40 mg total) by mouth 2 (two) times a day, Starting Thu 7/23/2020, Normal      predniSONE 5 mg tablet Starting Thu 2/6/2020, Historical Med      traMADol (ULTRAM) 50 mg tablet Take 1 tablet (50 mg total) by mouth every 8 (eight) hours as needed for severe pain, Starting Thu 4/14/2022, Normal      Vitamin D, Cholecalciferol, 25 MCG (1000 UT) TABS Take 2 tablets (2,000 Units total) by mouth daily, Starting Tue 8/15/2023, Normal             No discharge procedures on file.     PDMP Review         Value Time User    PDMP Reviewed  Yes 4/14/2022  4:37 PM Jory Bro DO            ED Provider  Electronically Signed by             Boogie Michaels DO  11/16/23 3118

## 2023-11-16 NOTE — DISCHARGE INSTRUCTIONS
Take Z-romie as prescribed  Albuterol as needed    Continue over the counter cough/cold medications or honey as needed  Stay well hydrated    Follow up with family doctor    Return to the ED if you have any new or worsening symptoms including but not limited to difficulty breathing, weakness, passing out, etc.

## 2023-11-21 ENCOUNTER — VBI (OUTPATIENT)
Dept: ADMINISTRATIVE | Facility: OTHER | Age: 63
End: 2023-11-21

## 2023-11-21 DIAGNOSIS — E11.9 TYPE 2 DIABETES MELLITUS WITHOUT COMPLICATION, WITHOUT LONG-TERM CURRENT USE OF INSULIN (HCC): ICD-10-CM

## 2023-11-21 RX ORDER — METFORMIN HYDROCHLORIDE 750 MG/1
750 TABLET, EXTENDED RELEASE ORAL
Qty: 90 TABLET | Refills: 0 | Status: SHIPPED | OUTPATIENT
Start: 2023-11-21

## 2023-11-22 ENCOUNTER — VBI (OUTPATIENT)
Dept: ADMINISTRATIVE | Facility: OTHER | Age: 63
End: 2023-11-22

## 2023-11-23 DIAGNOSIS — E55.9 VITAMIN D DEFICIENCY: ICD-10-CM

## 2023-11-24 RX ORDER — MULTIVIT-MIN/IRON/FOLIC ACID/K 18-600-40
2 CAPSULE ORAL DAILY
Qty: 180 TABLET | Refills: 0 | Status: SHIPPED | OUTPATIENT
Start: 2023-11-24

## 2023-12-02 ENCOUNTER — RA CDI HCC (OUTPATIENT)
Dept: OTHER | Facility: HOSPITAL | Age: 63
End: 2023-12-02

## 2023-12-02 NOTE — PROGRESS NOTES
Recurrent major depression in partial remission ([address])  [F33.41]    Morbid obesity ([address])  Noted 5/24/2019  [E66.01]    [address] coding opportunities          Chart Reviewed number of suggestions sent to Provider: 2     Patients Insurance        Commercial Insurance: Schuyler Perdomo

## 2023-12-07 ENCOUNTER — TELEPHONE (OUTPATIENT)
Dept: GASTROENTEROLOGY | Facility: CLINIC | Age: 63
End: 2023-12-07

## 2023-12-08 ENCOUNTER — OFFICE VISIT (OUTPATIENT)
Dept: GASTROENTEROLOGY | Facility: CLINIC | Age: 63
End: 2023-12-08
Payer: COMMERCIAL

## 2023-12-08 VITALS
BODY MASS INDEX: 46.57 KG/M2 | SYSTOLIC BLOOD PRESSURE: 122 MMHG | TEMPERATURE: 96.1 F | HEART RATE: 61 BPM | DIASTOLIC BLOOD PRESSURE: 86 MMHG | HEIGHT: 60 IN | WEIGHT: 237.2 LBS

## 2023-12-08 DIAGNOSIS — L65.9 HAIR LOSS: ICD-10-CM

## 2023-12-08 DIAGNOSIS — R51.9 NEW ONSET OF HEADACHES: ICD-10-CM

## 2023-12-08 DIAGNOSIS — E11.9 TYPE 2 DIABETES MELLITUS WITHOUT COMPLICATION, WITHOUT LONG-TERM CURRENT USE OF INSULIN (HCC): Primary | ICD-10-CM

## 2023-12-08 DIAGNOSIS — K76.0 NAFLD (NONALCOHOLIC FATTY LIVER DISEASE): ICD-10-CM

## 2023-12-08 DIAGNOSIS — K74.60 CIRRHOSIS OF LIVER WITHOUT ASCITES, UNSPECIFIED HEPATIC CIRRHOSIS TYPE (HCC): ICD-10-CM

## 2023-12-08 DIAGNOSIS — E66.01 CLASS 3 SEVERE OBESITY DUE TO EXCESS CALORIES WITHOUT SERIOUS COMORBIDITY WITH BODY MASS INDEX (BMI) OF 45.0 TO 49.9 IN ADULT (HCC): ICD-10-CM

## 2023-12-08 PROCEDURE — 99214 OFFICE O/P EST MOD 30 MIN: CPT | Performed by: STUDENT IN AN ORGANIZED HEALTH CARE EDUCATION/TRAINING PROGRAM

## 2023-12-08 NOTE — PATIENT INSTRUCTIONS
For patients with fatty liver disease, weight loss through diet and exercise is recommended. To MAINTAIN weight you must use up at least as many calories as you take in. To LOSE weight you must use up more calories than you take in. Start by knowing how many calories you should be eating and drinking to maintain your weight. Nutrition and calorie information on food labels is typically based on a 2,000 calorie diet. You may need fewer or more calories depending on several factors including age, gender, and level of physical activity. Increase the amount and intensity of your physical activity to match the number of calories you take in. Aim for at least 150 minutes of moderate physical activity or 75 minutes of vigorous physical activity - or an equal combination of both - each week. Regular physical activity can help you maintain your weight, keep off weight that you lose and help you reach physical and cardiovascular fitness. If it's hard to schedule regular exercise sessions, try aiming for sessions of at least 10 minutes spread throughout the week. As you make daily food choices, base your eating pattern on these recommendations:   Eat a variety of fresh, frozen and canned vegetables and fruits without high-calorie sauces or added salt and sugars. Replace high-calorie foods with fruits and vegetables. Choose fiber-rich whole grains for most grain servings. Choose poultry and fish without skin and prepare them in healthy ways without added saturated and trans fat. If you choose to eat meat, look for the leanest cuts available and prepare them in healthy and delicious ways. Eat a variety of fish at least twice a week, especially fish containing omega-3 fatty acids (for example, salmon, trout and herring). Select fat-free (skim) and low-fat (1%) dairy products. Avoid foods containing partially hydrogenated vegetable oils to reduce trans fat in your diet.    Limit saturated fat and trans fat and replace them with the better fats, monounsaturated and polyunsaturated. If you need to lower your blood cholesterol, reduce saturated fat to no more than 5 to 6 percent of total calories. For someone eating 2,000 calories a day, that's about 13 grams of saturated fat. Cut back on beverages and foods with added sugars. Choose foods with less sodium and prepare foods with little or no salt. To prevent fluid problems in the legs and abdomen aim to eat no more than 2,000 milligrams of sodium per day. If you can't meet these goals right now, even reducing sodium intake by 1,000 mg per day can benefit blood pressure. If you drink alcohol, drink in moderation. That means no more than one drink per day if you're a woman and no more than two drinks per day if you're a man. Please note that the effect of alcohol on NAFLD is unclear. Some studies show that moderate alcohol use may be beneficial, but others show no benefit and even harm. Discuss specific recommendations with your hepatologist.   Studies have shown that drinking up to 4 cups of caffeinated coffee per day may reduce progression of liver disease and development of liver cancer. If you have cirrhosis of the liver avoid raw shellfish such as oysters and clams. These can carry a specific bacteria that can be very harmful in liver disease    For more information on Fatty Liver Disease and Prevention please see this video from the [address]: "[employer].cy     For more information on healthy eating, please visit the American Heart Association website: [email or web address].uy. jsp     For tips on physical activity, please visit the American Heart Association website:   ConstitutionJournal.co.uk     If you are looking for additional local support, education or are ready to take action to end liver disease, contact the [employer]. The [facility] is the nation's leading nonprofit focusing on providing high-quality education and support services for the prevention, treatment and cure of over 100 liver diseases. You can learn more by visiting https://nguyen.net/. org/midatlantic/      Ct-scan scheduled on 12/22/23 at [facility]  MRI scheduled on 1/28/24 at [facility]

## 2023-12-08 NOTE — PROGRESS NOTES
[facility] - Outpatient Consultation  Arianne Quinn 59 y.o. female MRN: [id number]  Encounter: [id number]    PCP:  Graham Contreras, [phone]  Referring Provider:  No ref. 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 Patient is familiar to [facility] previously seen by Dr. Richard Perales in 2020 for abnormal liver function tests noted to have hepatomegaly and slightly heterogeneous liver parenchyma on ultrasound. Etiology was felt to be NAFLD given her metabolic risk factors, prior MTX use and negative serologic work-up. She developed new chronic thrombocytopenia concerning for progression of disease and had an US elastography which showed F4 fibrosis. EGD showed large non-bleeding esophageal varices for which she was started on carvedilol. We discussed the natural history, prognosis, and complications of cirrhosis, including decompensation in the form of ascites, variceal bleeding, and hepatic encephalopathy as well as risk for development of HCC. We discussed that weight loss of at least 5-10% of her body weight as well as aggressive modification of her metabolic risk factors are bundy in preventing progression of her disease. Will repeat her A1c today to see if she is a candidate for GLP1a therapy which has been shown to improve steatohepatitis and cause regression of fibrosis in patients with NAFLD. She is clinically compensated but would continue to watch for signs of HE given reports of mild disorientation and daytime sleepiness. Otherwise, she is up-to-date with her routine cirrhosis health maintenance. Will order CT head given reports of new onset HA and nausea. She will return to clinic in 3-6 months or sooner if needed.      1. Compensated cirrhosis  No symptomatic ascites, variceal bleeding, or hepatic encephalopathy. -MELD (3.0) 10  -Etiology: LERMA, consider GLP1a if A1c is elevated     2. Esophageal variceal surveillance  Her last EGD showed large varices 9/2023  - Continue carvedilol 6.25 mg BID  - No further screening endoscopies indicated     3. [address] surveillance  - Last US in 8/2023 without lesions  - MRI abdomen ordered for 3/2024 with AFP levels      4. Headaches with nausea, new onset   - CT head     5. Hair loss  - Send zinc, iron and vitamin D levels     6. Healthcare maintenance for patients with cirrhosis  -She was instructed to take no more than 2 grams of tylenol in 24 hours and no products containing NSAIDs, benzodiazapines, and narcotics. -She was also instructed to avoid raw shellfish   -She should participate in daily exercise as to prevent loss of muscle mass  -She should abstain from all alcohol intake and was counseled on this.    -She is at risk for vitamin deficiencies and metabolic bone disease. -HAV and HBV immunity will be checked and she should be vaccinated through her primary care provider if she is not immune.  -Additionally, she should receive a yearly flu shot and the pneumonia vaccine through her primary care provider. I made her aware of the symptoms of hepatic decompensation: evident confusion, vomiting blood, black tarry stool, or large amounts of red blood in stool, and abdominal swelling. In addition to seeking local medical attention urgently, she will notify me if this happens before the next visit. Saumya Hyde MD  Division of Gastroenterology and Hepatology  [address]    ============================================================================  CC/HPI: 59 y.o. 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 Interval events  - EGD showed large varices and she was started on carvedilol for primary prophylaxis.   - Seen in the ED for cough last month  - Reporting new onset headaches associated with nausea over the past two months  - Does report some mild disorientation, confusion and day time sleepiness. Has had 2-3 BMs daliy    Extended liver history  Patient is familiar to [facility] previously seen by Dr. Chris Petty in 2020 for abnormal liver function tests noted to have hepatomegaly and slightly heterogeneous liver parenchyma on ultrasound. U/S elastography was notable for F3 fibrosis. She had a serologic evaluation negative for autoimmune hepatitis, A1AT deficiency, Denver's disease and hemochromatosis. She was noted to have a positive hepatitis B core Ab IgM but with a negative core total antibody, surface antigen, surface antibody and HBV DNA. Also HCV negative. She has had persistently abnormal liver function tests, of mixed type with most recent hepatic function panel noting AST 64, ALT 77, alk phos 196, T. bili 1.4, and thrombocytopenia prompting an abdominal ultrasound notable for cirrhotic morphology without evidence of portal hypertension. US elastography was notable for F4 fibrosis. Denies a family history of liver disease or liver cancer. She does have autoimmune conditions including rheumatoid arthritis for which she was taking methotrexate x2 years but stopped 3 months prior. Denies EtOH use. Denies signs or symptoms of ascites, bleeding esophageal varices or hepatic encephalopathy. Denies pruritus, confusion, dark urine, bruising easily, yellowing of the eyes and/or skin, new/worsening abdominal distension or swelling of the lower extremities, abdominal pain, overt evidence of GI bleeding, or unintentional weight loss. ROS: Complete review of systems otherwise negative.      PAST MEDICAL/SURGICAL HISTORY:  Past Medical History:   Diagnosis Date    Depression     Diabetes ([address])     Diabetes mellitus ([address])     Fibromyalgia     GERD (gastroesophageal reflux disease)     Hypothyroid     Pneumonia     Shortness of breath         Past Surgical History:   Procedure Laterality Date    KNEE CARTILAGE SURGERY Right     TRACHEOSTOMY         FAMILY/SOCIAL HISTORY:  Family History   Problem Relation Age of Onset    Pneumonia Father     Asthma Brother        Social History     Tobacco Use    Smoking status: Former     Packs/day: 0.25     Years: 10.00     Total pack years: 2.50     Types: Cigarettes     Quit date: 1997     Years since quittin.8    Smokeless tobacco: Never   Vaping Use    Vaping Use: Never used   Substance Use Topics    Alcohol use:  Yes     Alcohol/week: 2.0 standard drinks of alcohol     Types: 2 Glasses of wine per week    Drug use: No       MEDICATIONS:  Current Outpatient Medications on File Prior to Visit   Medication Sig Dispense Refill    albuterol (Proventil HFA) 90 mcg/act inhaler Inhale 2 puffs every 4 (four) hours as needed for wheezing 6.7 g 0    atorvastatin (LIPITOR) 20 mg tablet take 1 tablet by mouth once daily 90 tablet 3    carvedilol (COREG) 6.25 mg tablet Take 1 tablet (6.25 mg total) by mouth 2 (two) times a day with meals 180 tablet 3    hydroxychloroquine (PLAQUENIL) 200 mg tablet Take 200 mg by mouth 2 (two) times a day with meals      metFORMIN (GLUCOPHAGE-XR) 750 mg 24 hr tablet Take 1 tablet by mouth once daily with breakfast 90 tablet 0    predniSONE 5 mg tablet       Vitamin D, Cholecalciferol, 25 MCG (1000 UT) TABS Take 2 tablets by mouth once daily 180 tablet 0    ciclopirox (PENLAC) 8 % solution Apply topically daily at bedtime 6.6 mL 3    diclofenac (VOLTAREN) 75 mg EC tablet Take 1 tablet (75 mg total) by mouth 2 (two) times a day PRN 60 tablet 1    ergocalciferol (VITAMIN D2) 50,000 units Take 1 capsule (50,000 Units total) by mouth every 28 days for 3 days 3 capsule 0    meloxicam (MOBIC) 15 mg tablet Take 1 tablet (15 mg total) by mouth daily 90 tablet 0    oxyCODONE-acetaminophen (PERCOCET) 5-325 mg per tablet Take 1 tablet by mouth every 6 (six) hours as needed for moderate painMax Daily Amount: 4 tablets 30 tablet 0    pantoprazole (PROTONIX) 40 mg tablet Take 1 tablet (40 mg total) by mouth 2 (two) times a day 28 tablet 3    traMADol (ULTRAM) 50 mg tablet Take 1 tablet (50 mg total) by mouth every 8 (eight) hours as needed for severe pain 60 tablet 0     No current facility-administered medications on file prior to visit. Allergies   Allergen Reactions    Penicillins     Wellbutrin [Bupropion]        PHYSICAL EXAM:  /86 (BP Location: Left arm, Patient Position: Sitting, Cuff Size: Adult)   Pulse 61   Temp (!) 96.1 °F (35.6 °C) (Tympanic)   Ht 5' (1.524 m)   Wt 108 kg (237 lb 3.2 oz)   BMI 46.32 kg/m²   GENERAL: NAD, AAO  HEENT: anicteric, OP clear, MMM  ABDOMEN: non-distended  EXTREMITIES: no edema  SKIN: no rashes, no palmar erythema, no spider angiomata   NEURO: normal gait, no tremor, no asterixis     LABS/RADIOLOGY/ENDOSCOPY:  Lab Results   Component Value Date    WBC 7.52 09/10/2023    HGB 14.5 09/10/2023    HCT 44.3 09/10/2023    PLT 68 (L) 09/10/2023    GLUF 143 (H) 09/10/2023    BUN 15 09/10/2023    CREATININE 0.54 (L) 09/10/2023    K 3.6 09/10/2023     09/10/2023    CO2 21 09/10/2023    BILIDIR 0.55 (H) 06/26/2023    ALKPHOS 124 (H) 09/10/2023    ALT 39 09/10/2023    AST 42 (H) 09/10/2023    CALCIUM 9.3 09/10/2023    EGFR 101 09/10/2023    TRIG 46 03/09/2021    HDL 92 03/09/2021    INR 1.10 09/10/2023     (H) 06/26/2023     EGD (9/2023)  Regular Z-line 40 cm from the incisors  Multiple large varices (no red karyna sign) in the esophagus; no bleeding was identified. Mild portal hypertensive gastropathy. The duodenum appeared normal.    US abdomen (7/20/2023)  1. Hepatic cirrhosis. 2. Stable 4 mm gallbladder polyp does not currently meet guidelines recommendations for follow-up. 3. Slight heterogenous echotexture of the spleen, nonspecific. No focal masses.     MELD 3.0: 10 at 9/10/2023 10:57 AM  MELD-Na: 9 at 9/10/2023 10:57 AM  Calculated from:  Serum Creatinine: 0.54 mg/dL (Using min of 1 mg/dL) at 9/10/2023 10:57 AM  Serum Sodium: 137 mmol/L at 9/10/2023 10:57 AM  Total Bilirubin: 1.38 mg/dL at 9/10/2023 10:57 AM  Serum Albumin: 3.9 g/dL (Using max of 3.5 g/dL) at 9/10/2023 10:57 AM  INR(ratio): 1.10 at 9/10/2023 10:57 AM  Age at listing (hypothetical): 63 years  Sex: Female at 9/10/2023 10:57 AM

## 2023-12-19 ENCOUNTER — TELEPHONE (OUTPATIENT)
Age: 63
End: 2023-12-19

## 2023-12-19 NOTE — TELEPHONE ENCOUNTER
Patients GI provider:  Dr. Jones    Number to return call: [phone]    Reason for call: Traci from Pre-encounter called in stating that Neyda Stock sent 2 in basket messages to the provider in regards to a peer to peer that is needed for pts upcoming CT scan on 12/22/23. Message from Neyda now indicates that it is denied as there was no response in regards to the peer to peer. Peer to peer number is [phone] and the tracking number is [id number]. Requesting to reach out to pt if unable to get peer to peer down on time to notify that the CT will need to be cancelled.     Scheduled procedure/appointment date if applicable: Appt 3/4/24

## 2023-12-21 DIAGNOSIS — R11.0 NAUSEA: ICD-10-CM

## 2023-12-21 DIAGNOSIS — R51.9 NEW ONSET OF HEADACHES: Primary | ICD-10-CM

## 2023-12-27 ENCOUNTER — VBI (OUTPATIENT)
Dept: ADMINISTRATIVE | Facility: OTHER | Age: 63
End: 2023-12-27

## 2024-01-11 ENCOUNTER — TELEPHONE (OUTPATIENT)
Age: 64
End: 2024-01-11

## 2024-01-11 NOTE — TELEPHONE ENCOUNTER
Pt's MRI of the abdomen and brain have been approved. The approval codes are     Abdomen-[id number]  Brain- [id number]    Both valid from 1/5/24 to 2/4/24

## 2024-01-13 ENCOUNTER — APPOINTMENT (OUTPATIENT)
Dept: LAB | Facility: MEDICAL CENTER | Age: 64
End: 2024-01-13
Payer: COMMERCIAL

## 2024-01-13 DIAGNOSIS — K76.0 NAFLD (NONALCOHOLIC FATTY LIVER DISEASE): ICD-10-CM

## 2024-01-13 DIAGNOSIS — E11.9 TYPE 2 DIABETES MELLITUS WITHOUT COMPLICATION, WITHOUT LONG-TERM CURRENT USE OF INSULIN (HCC): ICD-10-CM

## 2024-01-13 DIAGNOSIS — L65.9 HAIR LOSS: ICD-10-CM

## 2024-01-13 DIAGNOSIS — E66.01 CLASS 3 SEVERE OBESITY DUE TO EXCESS CALORIES WITHOUT SERIOUS COMORBIDITY WITH BODY MASS INDEX (BMI) OF 45.0 TO 49.9 IN ADULT (HCC): ICD-10-CM

## 2024-01-13 LAB
25(OH)D3 SERPL-MCNC: 61 NG/ML (ref 30–100)
ALBUMIN SERPL BCP-MCNC: 4.1 G/DL (ref 3.5–5)
ALP SERPL-CCNC: 118 U/L (ref 34–104)
ALT SERPL W P-5'-P-CCNC: 40 U/L (ref 7–52)
ANION GAP SERPL CALCULATED.3IONS-SCNC: 6 MMOL/L
AST SERPL W P-5'-P-CCNC: 45 U/L (ref 13–39)
BILIRUB SERPL-MCNC: 1.45 MG/DL (ref 0.2–1)
BUN SERPL-MCNC: 11 MG/DL (ref 5–25)
CALCIUM SERPL-MCNC: 9.5 MG/DL (ref 8.4–10.2)
CHLORIDE SERPL-SCNC: 102 MMOL/L (ref 96–108)
CO2 SERPL-SCNC: 28 MMOL/L (ref 21–32)
CREAT SERPL-MCNC: 0.66 MG/DL (ref 0.6–1.3)
GFR SERPL CREATININE-BSD FRML MDRD: 94 ML/MIN/1.73SQ M
GLUCOSE P FAST SERPL-MCNC: 180 MG/DL (ref 65–99)
IRON SATN MFR SERPL: 41 % (ref 15–50)
IRON SERPL-MCNC: 129 UG/DL (ref 50–212)
POTASSIUM SERPL-SCNC: 3.4 MMOL/L (ref 3.5–5.3)
PROT SERPL-MCNC: 6.6 G/DL (ref 6.4–8.4)
SODIUM SERPL-SCNC: 136 MMOL/L (ref 135–147)
TIBC SERPL-MCNC: 315 UG/DL (ref 250–450)
UIBC SERPL-MCNC: 186 UG/DL (ref 155–355)

## 2024-01-13 PROCEDURE — 80053 COMPREHEN METABOLIC PANEL: CPT

## 2024-01-13 PROCEDURE — 83036 HEMOGLOBIN GLYCOSYLATED A1C: CPT

## 2024-01-13 PROCEDURE — 84630 ASSAY OF ZINC: CPT

## 2024-01-13 PROCEDURE — 36415 COLL VENOUS BLD VENIPUNCTURE: CPT

## 2024-01-13 PROCEDURE — 82728 ASSAY OF FERRITIN: CPT

## 2024-01-13 PROCEDURE — 82306 VITAMIN D 25 HYDROXY: CPT

## 2024-01-13 PROCEDURE — 84443 ASSAY THYROID STIM HORMONE: CPT

## 2024-01-13 PROCEDURE — 83540 ASSAY OF IRON: CPT

## 2024-01-13 PROCEDURE — 83550 IRON BINDING TEST: CPT

## 2024-01-14 LAB
EST. AVERAGE GLUCOSE BLD GHB EST-MCNC: 157 MG/DL
FERRITIN SERPL-MCNC: 284 NG/ML (ref 11–307)
HBA1C MFR BLD: 7.1 %
TSH SERPL DL<=0.05 MIU/L-ACNC: 4.09 UIU/ML (ref 0.45–4.5)

## 2024-01-18 LAB — ZINC SERPL-MCNC: 55 UG/DL (ref 44–115)

## 2024-01-26 ENCOUNTER — HOSPITAL ENCOUNTER (OUTPATIENT)
Dept: MRI IMAGING | Facility: HOSPITAL | Age: 64
Discharge: HOME | End: 2024-01-26
Payer: COMMERCIAL

## 2024-01-26 ENCOUNTER — TELEPHONE (OUTPATIENT)
Dept: INTERNAL MEDICINE CLINIC | Facility: CLINIC | Age: 64
End: 2024-01-26

## 2024-01-26 ENCOUNTER — TELEMEDICINE (OUTPATIENT)
Dept: INTERNAL MEDICINE CLINIC | Facility: CLINIC | Age: 64
End: 2024-01-26
Payer: COMMERCIAL

## 2024-01-26 DIAGNOSIS — R11.0 NAUSEA: ICD-10-CM

## 2024-01-26 DIAGNOSIS — J06.9 UPPER RESPIRATORY TRACT INFECTION, UNSPECIFIED TYPE: Primary | ICD-10-CM

## 2024-01-26 DIAGNOSIS — R51.9 NEW ONSET OF HEADACHES: ICD-10-CM

## 2024-01-26 PROCEDURE — 99213 OFFICE O/P EST LOW 20 MIN: CPT | Performed by: INTERNAL MEDICINE

## 2024-01-26 PROCEDURE — G1004 CDSM NDSC: HCPCS

## 2024-01-26 PROCEDURE — 70553 MRI BRAIN STEM W/O & W/DYE: CPT

## 2024-01-26 PROCEDURE — A9585 GADOBUTROL INJECTION: HCPCS | Performed by: FAMILY MEDICINE

## 2024-01-26 RX ORDER — GADOBUTROL 604.72 MG/ML
10 INJECTION INTRAVENOUS
Status: COMPLETED | OUTPATIENT
Start: 2024-01-26 | End: 2024-01-26

## 2024-01-26 RX ORDER — DOXYCYCLINE HYCLATE 100 MG/1
100 CAPSULE ORAL EVERY 12 HOURS SCHEDULED
Qty: 14 CAPSULE | Refills: 0 | Status: SHIPPED | OUTPATIENT
Start: 2024-01-26 | End: 2024-02-02

## 2024-01-26 RX ADMIN — GADOBUTROL 10 ML: 604.72 INJECTION INTRAVENOUS at 16:46

## 2024-01-26 NOTE — TELEPHONE ENCOUNTER
Poli SHULTZ and I'm calling for Susy. We're both [facility] patients and then we're going to be going to your facility and be seen about a month ago you [place] who[place] [id number] their birth date. We both went to the and found out that we had bronchitis, was given the Z Pack and now it's been lingering a little and it's now back to a heavy duty. I think we need another Z Pack for her. Could you please prescribe one for her so she could start getting better? I did a COVID test, it came out negative. So it's not COVID. I just got done doing it and now it's we need to do something about this. Could you please give me a call [phone]? Thank you.

## 2024-01-26 NOTE — PROGRESS NOTES
Virtual Regular Visit    Verification of patient location:    Patient is located at Home in the following state in which I hold an active license [place]      Assessment/Plan:    Problem List Items Addressed This Visit    None  Visit Diagnoses       Upper respiratory tract infection, unspecified type    -  Primary          Disposition:    -URI -> suspect bacterial sinus infection/URI not resolved and returning.  Will treat with doxycycline x7days.  D/w patient AE of med including to eat with antibiotic and to avoid dairy with the antibiotic.       Reason for visit is   Chief Complaint   Patient presents with    Virtual Regular Visit          Encounter provider Carlos Shipley DO    Provider located at [address] [place] [id number] [phone]      Recent Visits  No visits were found meeting these conditions.  Showing recent visits within past 7 days and meeting all other requirements  Today's Visits  Date Type Provider Dept   01/26/24 Telemedicine Carlos Shipley DO [facility]   01/26/24 Telephone [facility]   Showing today's visits and meeting all other requirements  Future Appointments  No visits were found meeting these conditions.  Showing future appointments within next 150 days and meeting all other requirements       The patient was identified by name and date of birth. Susy Crain was informed that this is a telemedicine visit and that the visit is being conducted through the Epic Embedded platform. She agrees to proceed..  My office door was closed. No one else was in the room.  She acknowledged consent and understanding of privacy and security of the video platform. The patient has agreed to participate and understands they can discontinue the visit at any time.    Patient is aware this is a billable service.     Subjective  Susy Crain is a 63 y.o. female for  follow up, virtual(video) visit.    HPI     Susy had 1 week of URI symptoms last month.  She was to an urgent care and was prescribed a z-pack.  This helped her feel better but not to 100% and her symptoms lingered for a few weeks and have returned again now the last few days.  No fever but chills and cough, congestion, postnasal drip and sinus congestion.  She cannot take penicillins.  She is taking OTC remedies.  She is coughing a good deal and does not feel well.  She did do a home COVID test 3-4 weeks ago when her symptoms started and it was negative.  ROS Otherwise negative, no other complaints.      Past Medical History:   Diagnosis Date    Depression     Diabetes (HCC)     Diabetes mellitus (HCC)     Fibromyalgia     GERD (gastroesophageal reflux disease)     Hypothyroid     Pneumonia     Shortness of breath        Past Surgical History:   Procedure Laterality Date    KNEE CARTILAGE SURGERY Right     TRACHEOSTOMY         Current Outpatient Medications   Medication Sig Dispense Refill    albuterol (Proventil HFA) 90 mcg/act inhaler Inhale 2 puffs every 4 (four) hours as needed for wheezing 6.7 g 0    atorvastatin (LIPITOR) 20 mg tablet take 1 tablet by mouth once daily 90 tablet 3    carvedilol (COREG) 6.25 mg tablet Take 1 tablet (6.25 mg total) by mouth 2 (two) times a day with meals 180 tablet 3    ciclopirox (PENLAC) 8 % solution Apply topically daily at bedtime 6.6 mL 3    diclofenac (VOLTAREN) 75 mg EC tablet Take 1 tablet (75 mg total) by mouth 2 (two) times a day PRN 60 tablet 1    ergocalciferol (VITAMIN D2) 50,000 units Take 1 capsule (50,000 Units total) by mouth every 28 days for 3 days 3 capsule 0    hydroxychloroquine (PLAQUENIL) 200 mg tablet Take 200 mg by mouth 2 (two) times a day with meals      meloxicam (MOBIC) 15 mg tablet Take 1 tablet (15 mg total) by mouth daily 90 tablet 0    metFORMIN (GLUCOPHAGE-XR) 750 mg 24 hr tablet Take 1 tablet by mouth once daily with breakfast 90 tablet 0     oxyCODONE-acetaminophen (PERCOCET) 5-325 mg per tablet Take 1 tablet by mouth every 6 (six) hours as needed for moderate painMax Daily Amount: 4 tablets 30 tablet 0    pantoprazole (PROTONIX) 40 mg tablet Take 1 tablet (40 mg total) by mouth 2 (two) times a day 28 tablet 3    predniSONE 5 mg tablet       traMADol (ULTRAM) 50 mg tablet Take 1 tablet (50 mg total) by mouth every 8 (eight) hours as needed for severe pain 60 tablet 0    Vitamin D, Cholecalciferol, 25 MCG (1000 UT) TABS Take 2 tablets by mouth once daily 180 tablet 0     No current facility-administered medications for this visit.        Allergies   Allergen Reactions    Penicillins     Wellbutrin [Bupropion]        Review of Systems   Constitutional:  Positive for chills. Negative for fever.   HENT:  Positive for congestion, postnasal drip and sinus pressure.    Respiratory:  Positive for cough. Negative for shortness of breath.    Skin:  Negative for rash.   Allergic/Immunologic: Negative for immunocompromised state.   Psychiatric/Behavioral:  Negative for dysphoric mood.        Video Exam    There were no vitals filed for this visit.    Physical Exam  Constitutional:       General: She is not in acute distress.     Appearance: She is ill-appearing.   Eyes:      Conjunctiva/sclera: Conjunctivae normal.   Pulmonary:      Effort: Pulmonary effort is normal. No respiratory distress.   Neurological:      Mental Status: She is alert.   Psychiatric:         Mood and Affect: Mood normal.         Behavior: Behavior normal.          Visit Time  Total Visit Duration: I have spent a total time of 12 minutes on 01/26/24 in caring for this patient including Risks and benefits of tx options, Instructions for management, Patient and family education, Counseling / Coordination of care, Documenting in the medical record, Reviewing / ordering tests, medicine, procedures  , and Obtaining or reviewing history  .

## 2024-01-28 ENCOUNTER — HOSPITAL ENCOUNTER (OUTPATIENT)
Dept: MRI IMAGING | Facility: HOSPITAL | Age: 64
Discharge: HOME | End: 2024-01-28
Attending: STUDENT IN AN ORGANIZED HEALTH CARE EDUCATION/TRAINING PROGRAM
Payer: COMMERCIAL

## 2024-01-28 DIAGNOSIS — K74.60 CIRRHOSIS OF LIVER WITHOUT ASCITES, UNSPECIFIED HEPATIC CIRRHOSIS TYPE (HCC): ICD-10-CM

## 2024-01-28 PROCEDURE — G1004 CDSM NDSC: HCPCS

## 2024-01-28 PROCEDURE — A9585 GADOBUTROL INJECTION: HCPCS | Performed by: STUDENT IN AN ORGANIZED HEALTH CARE EDUCATION/TRAINING PROGRAM

## 2024-01-28 PROCEDURE — 74183 MRI ABD W/O CNTR FLWD CNTR: CPT

## 2024-01-28 RX ORDER — GADOBUTROL 604.72 MG/ML
10 INJECTION INTRAVENOUS
Status: COMPLETED | OUTPATIENT
Start: 2024-01-28 | End: 2024-01-28

## 2024-01-28 RX ADMIN — GADOBUTROL 10 ML: 604.72 INJECTION INTRAVENOUS at 13:14

## 2024-02-01 ENCOUNTER — OFFICE VISIT (OUTPATIENT)
Dept: INTERNAL MEDICINE CLINIC | Facility: CLINIC | Age: 64
End: 2024-02-01
Payer: COMMERCIAL

## 2024-02-01 VITALS
WEIGHT: 238 LBS | HEART RATE: 61 BPM | DIASTOLIC BLOOD PRESSURE: 78 MMHG | TEMPERATURE: 98 F | SYSTOLIC BLOOD PRESSURE: 118 MMHG | OXYGEN SATURATION: 98 % | BODY MASS INDEX: 46.48 KG/M2

## 2024-02-01 DIAGNOSIS — M05.79 RHEUMATOID ARTHRITIS INVOLVING MULTIPLE SITES WITH POSITIVE RHEUMATOID FACTOR (HCC): Primary | ICD-10-CM

## 2024-02-01 DIAGNOSIS — Z12.31 ENCOUNTER FOR SCREENING MAMMOGRAM FOR MALIGNANT NEOPLASM OF BREAST: ICD-10-CM

## 2024-02-01 DIAGNOSIS — Z12.4 SCREENING FOR CERVICAL CANCER: ICD-10-CM

## 2024-02-01 DIAGNOSIS — E78.01 FAMILIAL HYPERCHOLESTEROLEMIA: ICD-10-CM

## 2024-02-01 DIAGNOSIS — E11.9 TYPE 2 DIABETES MELLITUS WITHOUT COMPLICATION, WITHOUT LONG-TERM CURRENT USE OF INSULIN (HCC): ICD-10-CM

## 2024-02-01 PROBLEM — Z12.11 COLON CANCER SCREENING: Status: RESOLVED | Noted: 2020-04-30 | Resolved: 2024-02-01

## 2024-02-01 PROCEDURE — 99215 OFFICE O/P EST HI 40 MIN: CPT | Performed by: INTERNAL MEDICINE

## 2024-02-01 NOTE — PROGRESS NOTES
Name: Susy Crain      : 1960      MRN: [id number]  Encounter Provider: Carlos Shipley DO  Encounter Date: 2024   Encounter department: [facility]    Assessment & Plan     1. Rheumatoid arthritis involving multiple sites with positive rheumatoid factor (HCC)  Comments:  seeing Rheum for ongoing care, c/w care per specialist    2. Type 2 diabetes mellitus without complication, without long-term current use of insulin (HCC)  Comments:  will try switching metformin to ozempic.  c/w checking BS  Orders:  -     Albumin / creatinine urine ratio; Future  -     semaglutide, 0.25 or 0.5 mg/dose, (Ozempic, 0.25 or 0.5 MG/DOSE,) 2 mg/3 mL injection pen; 0.25 mg under the skin every 7 days for 4 doses (28 days), THEN 0.5 mg under the skin every 7 days    3. Familial hypercholesterolemia  Comments:  taking statin and no SE.  Will ask hepatology if statin can be continued    4. Screening for cervical cancer  -     Ambulatory Referral to Obstetrics / Gynecology; Future    5. Encounter for screening mammogram for malignant neoplasm of breast  Comments:  she declines mammo and accepts the associated risks of her decision  Orders:  -     Mammo screening bilateral w 3d & cad; Future; Expected date: 2024        Depression Screening and Follow-up Plan: Patient's depression screening was positive with a PHQ-9 score of 10. Patient assessed for underlying major depression. Brief counseling provided and recommend additional follow-up/re-evaluation next office visit.       I have spent a total time of 45 minutes on 24 in caring for this patient including Diagnostic results, Risks and benefits of tx options, Instructions for management, Patient and family education, Impressions, Counseling / Coordination of care, Documenting in the medical record, Reviewing / ordering tests, medicine, procedures  , and Obtaining or reviewing history  .    Subjective      HPI    New to me, here to  establish care.  Used to see Dr Mann as PCP in past.  Reviewed meds and briefly reviewed past med hx with patient.  Susy was recently diagnosed with cirrhosis and is seeing hepatology for ongoing eval/care.  She is feeling better from URI.  She is no longer taking several of her medications.  She is seeing Dr Jefferson but is transitioning to Dr Finney for RA and fibromyalgia care.  She refuses to complete mammogram.  She is checking her BS occasionally and they are doing well.  She would rather take a GLP-1 agonist due to having cirrhosis.  She has no fhx thyroid cancer or pancreatitis.  She takes prednisone daily for RA.  She has not seen a Gyn in years.  ROS otherwise negative, no other complaints.    Review of Systems   Constitutional:  Negative for fever.   HENT:  Negative for congestion.    Eyes:  Negative for visual disturbance.   Respiratory:  Negative for shortness of breath.    Cardiovascular:  Negative for chest pain.   Gastrointestinal:  Negative for abdominal pain.   Endocrine: Negative for polyuria.   Genitourinary:  Negative for difficulty urinating.   Musculoskeletal:  Positive for arthralgias. Negative for gait problem.   Skin:  Negative for rash.   Allergic/Immunologic: Positive for immunocompromised state.   Neurological:  Negative for dizziness.   Psychiatric/Behavioral:  Negative for dysphoric mood.        Current Outpatient Medications on File Prior to Visit   Medication Sig    albuterol (Proventil HFA) 90 mcg/act inhaler Inhale 2 puffs every 4 (four) hours as needed for wheezing    atorvastatin (LIPITOR) 20 mg tablet take 1 tablet by mouth once daily    carvedilol (COREG) 6.25 mg tablet Take 1 tablet (6.25 mg total) by mouth 2 (two) times a day with meals    doxycycline hyclate (VIBRAMYCIN) 100 mg capsule Take 1 capsule (100 mg total) by mouth every 12 (twelve) hours for 7 days    hydroxychloroquine (PLAQUENIL) 200 mg tablet Take 200 mg by mouth 2 (two) times a day with meals    metFORMIN  (GLUCOPHAGE-XR) 750 mg 24 hr tablet Take 1 tablet by mouth once daily with breakfast    predniSONE 5 mg tablet     Vitamin D, Cholecalciferol, 25 MCG (1000 UT) TABS Take 2 tablets by mouth once daily    [DISCONTINUED] ciclopirox (PENLAC) 8 % solution Apply topically daily at bedtime    [DISCONTINUED] diclofenac (VOLTAREN) 75 mg EC tablet Take 1 tablet (75 mg total) by mouth 2 (two) times a day PRN    [DISCONTINUED] ergocalciferol (VITAMIN D2) 50,000 units Take 1 capsule (50,000 Units total) by mouth every 28 days for 3 days    [DISCONTINUED] meloxicam (MOBIC) 15 mg tablet Take 1 tablet (15 mg total) by mouth daily    [DISCONTINUED] oxyCODONE-acetaminophen (PERCOCET) 5-325 mg per tablet Take 1 tablet by mouth every 6 (six) hours as needed for moderate painMax Daily Amount: 4 tablets    [DISCONTINUED] pantoprazole (PROTONIX) 40 mg tablet Take 1 tablet (40 mg total) by mouth 2 (two) times a day    [DISCONTINUED] traMADol (ULTRAM) 50 mg tablet Take 1 tablet (50 mg total) by mouth every 8 (eight) hours as needed for severe pain       Objective     /78 (BP Location: Left arm, Patient Position: Sitting, Cuff Size: Standard)   Pulse 61   Temp 98 °F (36.7 °C)   Wt 108 kg (238 lb)   SpO2 98%   BMI 46.48 kg/m²     Physical Exam  Vitals reviewed.   Constitutional:       General: She is not in acute distress.     Appearance: She is obese.   HENT:      Head: Normocephalic and atraumatic.      Right Ear: Tympanic membrane normal.      Left Ear: Tympanic membrane normal.      Mouth/Throat:      Pharynx: Oropharynx is clear.   Eyes:      Conjunctiva/sclera: Conjunctivae normal.   Cardiovascular:      Rate and Rhythm: Normal rate and regular rhythm.      Pulses: no weak pulses          Dorsalis pedis pulses are 2+ on the right side and 2+ on the left side.        Posterior tibial pulses are 2+ on the right side and 2+ on the left side.      Heart sounds: No murmur heard.  Pulmonary:      Effort: Pulmonary effort is normal.       Breath sounds: No wheezing or rales.   Abdominal:      General: Bowel sounds are normal.      Palpations: Abdomen is soft.      Tenderness: There is no abdominal tenderness.   Musculoskeletal:      Cervical back: Neck supple.      Right lower leg: No edema.      Left lower leg: No edema.   Feet:      Right foot:      Skin integrity: No ulcer, skin breakdown, erythema, warmth, callus or dry skin.      Left foot:      Skin integrity: No ulcer, skin breakdown, erythema, warmth, callus or dry skin.   Neurological:      Mental Status: She is alert. Mental status is at baseline.   Psychiatric:         Mood and Affect: Mood normal.         Behavior: Behavior normal.       Diabetic Foot Exam    Patient's shoes and socks removed.    Right Foot/Ankle   Right Foot Inspection  Skin Exam: skin normal and skin intact. No dry skin, no warmth, no callus, no erythema, no maceration, no abnormal color, no pre-ulcer, no ulcer and no callus.     Toe Exam: ROM and strength within normal limits.     Sensory   Monofilament testing: intact    Vascular  Capillary refills: < 3 seconds  The right DP pulse is 2+. The right PT pulse is 2+.     Left Foot/Ankle  Left Foot Inspection  Skin Exam: skin normal and skin intact. No dry skin, no warmth, no erythema, no maceration, normal color, no pre-ulcer, no ulcer and no callus.     Toe Exam: ROM and strength within normal limits.     Sensory   Monofilament testing: intact    Vascular  Capillary refills: < 3 seconds  The left DP pulse is 2+. The left PT pulse is 2+.     Assign Risk Category  No deformity present  No loss of protective sensation  No weak pulses  Risk: 0      Carlos Shipley DO

## 2024-02-01 NOTE — Clinical Note
Hi Dr Jones  We are transitioning from metformin to ozempic if insurance will cover it  Can cristal continue to take statin given her cirrhosis?  What is underlying cause of cirrhosis?  Thanks

## 2024-02-01 NOTE — PATIENT INSTRUCTIONS
Ozempic once a week, once it is approved thru the insurance  Stop metformin once you start ozempic  Consider mammogram  Recommend Gyn eval  Follow up with Rheumatology  Return in 3 months

## 2024-02-06 ENCOUNTER — TELEPHONE (OUTPATIENT)
Age: 64
End: 2024-02-06

## 2024-02-06 DIAGNOSIS — K74.60 CIRRHOSIS OF LIVER WITHOUT ASCITES, UNSPECIFIED HEPATIC CIRRHOSIS TYPE (HCC): Primary | ICD-10-CM

## 2024-02-06 DIAGNOSIS — K76.9 LIVER LESION: ICD-10-CM

## 2024-02-06 NOTE — TELEPHONE ENCOUNTER
Patients GI provider:       Number to return call: ( [phone]     Reason for call: Pt calling in for MRI results     Scheduled procedure/appointment date if applicable: Apt/procedure

## 2024-02-07 NOTE — TELEPHONE ENCOUNTER
Called patient discussed the results of her MRI brain in further detail. No answer, LVM to return call.

## 2024-02-08 NOTE — TELEPHONE ENCOUNTER
Called and spoke with patient to clarify the results of her MRI brain.    Explained that her MRI brain showed mild vascular disease (trace, chronic microangiopathy) but otherwise without findings to suggest a cause of her headaches. She states that she does continue to have headaches but believes this may be related to dehydration amd has been making an effort to drink more water. Offered patient a referral to neurology for further evaluation. She is scheduled for a follow-up with her primary care provider in the near future and states that she would like to discuss her headaches with him prior to a referral. No objections.    Patient gave verbal understanding of results and all questions were answered to her satisfaction.

## 2024-02-09 ENCOUNTER — PATIENT MESSAGE (OUTPATIENT)
Dept: INTERNAL MEDICINE CLINIC | Facility: CLINIC | Age: 64
End: 2024-02-09

## 2024-02-09 NOTE — PATIENT COMMUNICATION
MD Carlos Saab Rai,   Yes, she can continue her statin. 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 Thanks!          Previous Messages       ----- Message -----  From: Calros Shipley DO  Sent: 2/1/2024   7:54 PM EST  To: Seble Jones MD    Hi Dr Jones    We are transitioning from metformin to ozempic if insurance will cover it    Can cristal continue to take statin given her cirrhosis?    What is underlying cause of cirrhosis?    Thanks

## 2024-02-11 ENCOUNTER — TELEPHONE (OUTPATIENT)
Dept: OTHER | Facility: OTHER | Age: 64
End: 2024-02-11

## 2024-02-11 NOTE — TELEPHONE ENCOUNTER
Patient's souse called, requesting a callback from office at best convenience to schedule a sick appointment. Patient's  expressed that patient has been sick for 2 weeks now and seems like she is not getting better. Please assist

## 2024-02-12 ENCOUNTER — TELEMEDICINE (OUTPATIENT)
Dept: INTERNAL MEDICINE CLINIC | Facility: CLINIC | Age: 64
End: 2024-02-12
Payer: COMMERCIAL

## 2024-02-12 DIAGNOSIS — R09.82 POSTNASAL DRIP: ICD-10-CM

## 2024-02-12 DIAGNOSIS — R05.8 POST-VIRAL COUGH SYNDROME: Primary | ICD-10-CM

## 2024-02-12 PROCEDURE — 99213 OFFICE O/P EST LOW 20 MIN: CPT | Performed by: INTERNAL MEDICINE

## 2024-02-12 NOTE — PROGRESS NOTES
COVID-19 Outpatient Progress Note    Assessment/Plan:    Problem List Items Addressed This Visit    None  Visit Diagnoses       Post-viral cough syndrome    -  Primary    Postnasal drip               Disposition:     Suspect Susy's symptoms are related to persistent cough and congestion post treatment of a URI.  Advised to use OTC remedies that she has taken before for cough and congestion.  Recommended flonase nasal spray and albuterol MDI.  Also advised rest/hydration/claritin and benzonatate rx which she declined a prescription for.  If not improving as expected, t/c CXR and steroid inhaler.    I have spent a total time of 15 minutes on the day of the encounter for this patient including risks and benefits of treatment options, instructions for management, patient and family education, impressions, documenting in the medical record, reviewing/ordering tests, medicine, procedures and obtaining or reviewing history.       Encounter provider: Carlos Shipley DO     Provider located at: [address] [address] [phone]     Recent Visits  No visits were found meeting these conditions.  Showing recent visits within past 7 days and meeting all other requirements  Today's Visits  Date Type Provider Dept   02/12/24 Telemedicine Carlos Shipley DO [facility]   Showing today's visits and meeting all other requirements  Future Appointments  No visits were found meeting these conditions.  Showing future appointments within next 150 days and meeting all other requirements     This virtual check-in was done via [employer] and patient was informed that this is a secure, HIPAA-compliant platform. She agrees to proceed.    Patient agrees to participate in a virtual check in via telephone or video visit instead of presenting to the office to address urgent/immediate medical needs. Patient is aware this is a  billable service. She acknowledged consent and understanding of privacy and security of the video platform. The patient has agreed to participate and understands they can discontinue the visit at any time.    After connecting through [employer], the patient was identified by name and date of birth. Susy Crain was informed that this was a telemedicine visit and that the exam was being conducted confidentially over secure lines. My office door was closed. No one else was in the room. Susy Crain acknowledged consent and understanding of privacy and security of the telemedicine visit. I informed the patient that I have reviewed her record in Epic and presented the opportunity for her to ask any questions regarding the visit today. The patient agreed to participate.     Verification of patient location:  Patient is located in the following state in which I hold an active license: [place]    Subjective:   Susy Crain is a 63 y.o. female who is concerned about COVID-19. Patient's symptoms include malaise, nasal congestion, rhinorrhea, sore throat and cough. Patient denies fever.     - Date of symptom onset: 2/8/2024      COVID-19 vaccination status: Fully vaccinated with booster    Exposure:   Contact with a person who is under investigation (PUI) for or who is positive for COVID-19 within the last 14 days?: No    Hospitalized recently for fever and/or lower respiratory symptoms?: No      Currently a healthcare worker that is involved in direct patient care?: No      Works in a special setting where the risk of COVID-19 transmission may be high? (this may include long-term care, correctional and snf facilities; homeless shelters; assisted-living facilities and group homes.): No      Resident in a special setting where the risk of COVID-19 transmission may be high? (this may include long-term care, correctional and snf facilities; homeless shelters; assisted-living facilities and group homes.): No      Susy is  having 5 days of coughing, runny nose, postnasal drip and sore throat.  She took doxycycline and finished that 10 days ago for a URI.  She has no new sick contacts.  She did not do a home COVID test.  She is not taking any medications for her symptoms.  She is mostly coughing and has some occ congestion.  ROS Otherwise negative, no other complaints.    Lab Results   Component Value Date    SARSCOV2 Negative 11/16/2023    SARSCOV2 Not Detected 06/30/2020       Review of Systems   Constitutional:  Negative for fever.   HENT:  Positive for congestion, rhinorrhea and sore throat.    Respiratory:  Positive for cough.    Allergic/Immunologic: Negative for immunocompromised state.   Psychiatric/Behavioral:  Positive for sleep disturbance (due to coughing).      Current Outpatient Medications on File Prior to Visit   Medication Sig    albuterol (Proventil HFA) 90 mcg/act inhaler Inhale 2 puffs every 4 (four) hours as needed for wheezing    atorvastatin (LIPITOR) 20 mg tablet take 1 tablet by mouth once daily    carvedilol (COREG) 6.25 mg tablet Take 1 tablet (6.25 mg total) by mouth 2 (two) times a day with meals    hydroxychloroquine (PLAQUENIL) 200 mg tablet Take 200 mg by mouth 2 (two) times a day with meals    metFORMIN (GLUCOPHAGE-XR) 750 mg 24 hr tablet Take 1 tablet by mouth once daily with breakfast    predniSONE 5 mg tablet     semaglutide, 0.25 or 0.5 mg/dose, (Ozempic, 0.25 or 0.5 MG/DOSE,) 2 mg/3 mL injection pen 0.25 mg under the skin every 7 days for 4 doses (28 days), THEN 0.5 mg under the skin every 7 days    Vitamin D, Cholecalciferol, 25 MCG (1000 UT) TABS Take 2 tablets by mouth once daily       Objective:    There were no vitals taken for this visit.       Physical Exam  Constitutional:       General: She is not in acute distress.     Appearance: Normal appearance.      Comments: Occ cough during the video visit   Eyes:      Conjunctiva/sclera: Conjunctivae normal.   Pulmonary:      Effort: Pulmonary  effort is normal. No respiratory distress.   Neurological:      Mental Status: She is alert.   Psychiatric:         Mood and Affect: Mood is anxious.         Behavior: Behavior normal.       Carlos Shipley,

## 2024-02-13 ENCOUNTER — TELEPHONE (OUTPATIENT)
Dept: INTERNAL MEDICINE CLINIC | Facility: CLINIC | Age: 64
End: 2024-02-13

## 2024-02-13 NOTE — TELEPHONE ENCOUNTER
The day Susy gives herself the ozempic shot, is the day she no longer takes metformin    Thank you

## 2024-02-13 NOTE — TELEPHONE ENCOUNTER
Message was left by patients         Yes, my name is Poli Prather and I'm calling from my wife Susy Ponce. SUSY Ponce, BHR [birth year]. We had a video chat last night with the Doctor, but we forgot to ask about. She's on Metformin and we're starting Ozempic. Do we stop the metformin or do we continue the metformin? And for how long until the Ozempic kicks in, we'd really like to know because we don't want to start messing up with our A1C and know she's borderline diabetic, so we're trying to do it right. So if you could please give me a call back, do we stop the metformin for the Ozempic or do we continue the metformin for a little while and then stop it? Please give me a call [phone]. Thank you.

## 2024-02-19 DIAGNOSIS — E78.01 FAMILIAL HYPERCHOLESTEROLEMIA: ICD-10-CM

## 2024-02-19 DIAGNOSIS — I85.00 ESOPHAGEAL VARICES DETERMINED BY ENDOSCOPY (HCC): ICD-10-CM

## 2024-02-19 DIAGNOSIS — J40 BRONCHITIS: ICD-10-CM

## 2024-02-19 RX ORDER — CARVEDILOL 6.25 MG/1
6.25 TABLET ORAL 2 TIMES DAILY WITH MEALS
Qty: 14 TABLET | Refills: 0 | Status: SHIPPED | OUTPATIENT
Start: 2024-02-19

## 2024-02-19 RX ORDER — HYDROXYCHLOROQUINE SULFATE 200 MG/1
200 TABLET, FILM COATED ORAL 2 TIMES DAILY WITH MEALS
Status: CANCELLED | OUTPATIENT
Start: 2024-02-19

## 2024-02-19 RX ORDER — ATORVASTATIN CALCIUM 20 MG/1
20 TABLET, FILM COATED ORAL DAILY
Qty: 90 TABLET | Refills: 3 | Status: CANCELLED | OUTPATIENT
Start: 2024-02-19

## 2024-02-19 RX ORDER — ATORVASTATIN CALCIUM 20 MG/1
20 TABLET, FILM COATED ORAL DAILY
Qty: 7 TABLET | Refills: 0 | Status: SHIPPED | OUTPATIENT
Start: 2024-02-19

## 2024-02-19 RX ORDER — ALBUTEROL SULFATE 90 UG/1
2 AEROSOL, METERED RESPIRATORY (INHALATION) EVERY 4 HOURS PRN
Qty: 6.7 G | Refills: 0 | Status: CANCELLED | OUTPATIENT
Start: 2024-02-19

## 2024-02-19 RX ORDER — ALBUTEROL SULFATE 90 UG/1
1 AEROSOL, METERED RESPIRATORY (INHALATION) EVERY 6 HOURS PRN
Qty: 8 G | Refills: 0 | Status: SHIPPED | OUTPATIENT
Start: 2024-02-19

## 2024-02-19 RX ORDER — CARVEDILOL 6.25 MG/1
6.25 TABLET ORAL 2 TIMES DAILY WITH MEALS
Qty: 180 TABLET | Refills: 3 | Status: CANCELLED | OUTPATIENT
Start: 2024-02-19 | End: 2025-02-18

## 2024-02-19 NOTE — TELEPHONE ENCOUNTER
I sent coreg, atorvastatin and albuterol inhaler as requested    I do not prescribe plaquenil and Susy will have to contact her rheumatologist for this medication    Thank you

## 2024-02-19 NOTE — TELEPHONE ENCOUNTER
Yes, this is Poli Prather [birth year]. I'm away from home on a family emergency. Had to drive up to [place]. I've been without my medications for a few days because I'm going to be stuck here trying to help my brother out and I need to know if I can get a supply of a temporary supply of my meds as well as meds for my wife. Susy [birth year] said to a pharmacy up here, Would you please give me a call? It's [phone] and I'd appreciate a call back to see if you guys could at least temporarily forward some meds to the pharmacy up here so we can take them. I didn't expect us to be stuck here and we both need our meds. I need the my amoxicillin that I'm on my Flomax and my atorvastatin 80 milligram. But also my wife all her meds too. Yes, please give us a call. Thank you.    Please put in a 7 day supply

## 2024-03-13 ENCOUNTER — TELEPHONE (OUTPATIENT)
Dept: INTERNAL MEDICINE CLINIC | Facility: CLINIC | Age: 64
End: 2024-03-13

## 2024-03-13 DIAGNOSIS — R11.0 NAUSEA: Primary | ICD-10-CM

## 2024-03-13 RX ORDER — ONDANSETRON 4 MG/1
4 TABLET, ORALLY DISINTEGRATING ORAL EVERY 12 HOURS PRN
Qty: 20 TABLET | Refills: 0 | Status: SHIPPED | OUTPATIENT
Start: 2024-03-13

## 2024-03-13 RX ORDER — ONDANSETRON 4 MG/1
4 TABLET, ORALLY DISINTEGRATING ORAL ONCE
Status: CANCELLED | OUTPATIENT
Start: 2024-03-13 | End: 2024-03-13

## 2024-03-13 NOTE — TELEPHONE ENCOUNTER
Pt called in she has been nauseous and would like Zofran it been going on for over 4 days and she stated that she is not pregnant

## 2024-03-26 ENCOUNTER — TELEPHONE (OUTPATIENT)
Dept: INTERNAL MEDICINE CLINIC | Facility: CLINIC | Age: 64
End: 2024-03-26

## 2024-03-26 ENCOUNTER — VBI (OUTPATIENT)
Dept: ADMINISTRATIVE | Facility: OTHER | Age: 64
End: 2024-03-26

## 2024-03-26 DIAGNOSIS — E11.9 TYPE 2 DIABETES MELLITUS WITHOUT COMPLICATION, WITHOUT LONG-TERM CURRENT USE OF INSULIN (HCC): ICD-10-CM

## 2024-03-26 DIAGNOSIS — Z59.89 DOES NOT HAVE HEALTH INSURANCE: Primary | ICD-10-CM

## 2024-03-26 SDOH — ECONOMIC STABILITY - INCOME SECURITY: OTHER PROBLEMS RELATED TO HOUSING AND ECONOMIC CIRCUMSTANCES: Z59.89

## 2024-03-26 NOTE — TELEPHONE ENCOUNTER
She should  one more refill of ozempic before her insurance runs out    Also, I put a referral to our     They will call her in the next few days to see how they can help her with her medications    Thank you

## 2024-03-26 NOTE — TELEPHONE ENCOUNTER
Hello, this is your bar. I am patient there and I am in a conundrum. My insurance, my medical insurance has been cancelled and I am running out of. Actually I took my last Ozempic so last Friday and this coming Friday I will have nothing. So I need doctor suggest something I have. I'm without. I don't know, so please call at [phone], [phone]. My birthday is June 27, 1960. Thank you.

## 2024-03-27 ENCOUNTER — PATIENT OUTREACH (OUTPATIENT)
Dept: INTERNAL MEDICINE CLINIC | Facility: CLINIC | Age: 64
End: 2024-03-27

## 2024-03-27 DIAGNOSIS — I85.00 ESOPHAGEAL VARICES DETERMINED BY ENDOSCOPY (HCC): ICD-10-CM

## 2024-03-27 DIAGNOSIS — Z59.89 INSURANCE COVERAGE PROBLEMS: Primary | ICD-10-CM

## 2024-03-27 DIAGNOSIS — E78.01 FAMILIAL HYPERCHOLESTEROLEMIA: ICD-10-CM

## 2024-03-27 RX ORDER — CARVEDILOL 6.25 MG/1
6.25 TABLET ORAL 2 TIMES DAILY WITH MEALS
Qty: 14 TABLET | Refills: 0 | Status: CANCELLED | OUTPATIENT
Start: 2024-03-27

## 2024-03-27 RX ORDER — HYDROXYCHLOROQUINE SULFATE 200 MG/1
200 TABLET, FILM COATED ORAL 2 TIMES DAILY WITH MEALS
Status: CANCELLED | OUTPATIENT
Start: 2024-03-27

## 2024-03-27 RX ORDER — ATORVASTATIN CALCIUM 20 MG/1
20 TABLET, FILM COATED ORAL DAILY
Qty: 90 TABLET | Refills: 1 | Status: SHIPPED | OUTPATIENT
Start: 2024-03-27

## 2024-03-27 SDOH — ECONOMIC STABILITY - INCOME SECURITY: OTHER PROBLEMS RELATED TO HOUSING AND ECONOMIC CIRCUMSTANCES: Z59.89

## 2024-03-27 NOTE — PROGRESS NOTES
Request received from PCP for Outpatient Social Work Care Manager (OP SWCM) to outreach patient and assist with health insurance coverage needs.  Patient informed PCP office that she will be losing her current insurance coverage and will no longer be able to afford her medications (ozempic).  PCP recommended patient refill medication before insurance ends.    Call placed to patient to further assist.  No answer, voicemail left, and awaiting return call.

## 2024-03-27 NOTE — TELEPHONE ENCOUNTER
Coreg has refills at the [facility] pharmacy, cristal needs to call them for a refill    Plaquenil is not ordered by me, but by her rheumatologist    Thank you

## 2024-03-28 ENCOUNTER — PATIENT OUTREACH (OUTPATIENT)
Dept: INTERNAL MEDICINE CLINIC | Facility: CLINIC | Age: 64
End: 2024-03-28

## 2024-03-28 DIAGNOSIS — Z59.89 DOES NOT HAVE HEALTH INSURANCE: Primary | ICD-10-CM

## 2024-03-28 SDOH — ECONOMIC STABILITY - INCOME SECURITY: OTHER PROBLEMS RELATED TO HOUSING AND ECONOMIC CIRCUMSTANCES: Z59.89

## 2024-03-28 NOTE — PROGRESS NOTES
Message received from patient's  Poli in response to OP SW's initial outreach attempt to patient to discuss insurance coverage needs.  Poli requesting return call from OP SW to further discuss.    Return call placed to patient's  Poli (on consent).  Poli confirmed patient had insurance through his employer plan but this plan ended at the end of February as Poli is no longer employed through previous company.  Both patient and Poli are in need of health insurance coverage options.  Poli worried about cost of patient's medications as well, specifically ozempic.  Patient ineligible for PACE due to age criteria.  They have no household income at this time between patient and patient's .  Patient has applied for SSDI benefits due to inability to work but is still in process of applying; appt scheduled for 4/15.  They are living off of their savings at this time.    Referral placed for CMOC Abby to assist with MA and SNAP applications.  Will also have CMOC place referrals to [employer] through [facility] pending patient's 's thoughts on this.    Discussed Ozempic PAP through [employer] PAP.  Patient can apply for this but will be denied for assistance if MA is approved.  Poli requested OP [facility] email information for the PAP to reference pending MA determination.  He is familiar with [employer] assistance.  Email sent.    Will follow.

## 2024-03-29 ENCOUNTER — PATIENT OUTREACH (OUTPATIENT)
Dept: INTERNAL MEDICINE CLINIC | Facility: CLINIC | Age: 64
End: 2024-03-29

## 2024-03-29 DIAGNOSIS — I85.00 ESOPHAGEAL VARICES DETERMINED BY ENDOSCOPY (HCC): ICD-10-CM

## 2024-03-29 RX ORDER — CARVEDILOL 6.25 MG/1
6.25 TABLET ORAL 2 TIMES DAILY WITH MEALS
Qty: 14 TABLET | Refills: 0 | OUTPATIENT
Start: 2024-03-29

## 2024-03-29 RX ORDER — CARVEDILOL 6.25 MG/1
6.25 TABLET ORAL 2 TIMES DAILY WITH MEALS
Qty: 14 TABLET | Refills: 0 | Status: CANCELLED | OUTPATIENT
Start: 2024-03-29

## 2024-03-29 NOTE — PROGRESS NOTES
received referral from ABDIFATAH Bowles to outreach patient for assistance with Medicaid, SNAP, and PACE.     CMOC called phone and it rang, no answer no VM.     CMOC will outreach again    4/1

## 2024-03-29 NOTE — TELEPHONE ENCOUNTER
She has to call her [facility]  for this refill    It is ordered by their office    Thank you

## 2024-04-01 ENCOUNTER — PATIENT OUTREACH (OUTPATIENT)
Dept: INTERNAL MEDICINE CLINIC | Facility: CLINIC | Age: 64
End: 2024-04-01

## 2024-04-01 DIAGNOSIS — E11.9 TYPE 2 DIABETES MELLITUS WITHOUT COMPLICATION, WITHOUT LONG-TERM CURRENT USE OF INSULIN (HCC): ICD-10-CM

## 2024-04-01 RX ORDER — METFORMIN HYDROCHLORIDE 750 MG/1
750 TABLET, EXTENDED RELEASE ORAL
Qty: 90 TABLET | Refills: 0 | Status: CANCELLED | OUTPATIENT
Start: 2024-04-01

## 2024-04-01 NOTE — TELEPHONE ENCOUNTER
Her liver dr is okay with her taking metformin again    I ordered it, to take 500mg twice a day with breakfast and with dinner    Please monitor blood sugars while taking this med    Thanks    ----------------------------------------      Seble Jones MD   to Me  [facility] [employer]       4/1/24 12:15 PM   She can take metformin. It is only contraindicated in decompensated liver disease.

## 2024-04-01 NOTE — TELEPHONE ENCOUNTER
She has cirrhosis of her liver    Metformin is not a good option for her unless her liver doctor is okay with her resuming metformin    She needs to call their office to see if they are okay with her taking metformin.  I will message their office as well    If yes, we could possibly do 500mg twice a day

## 2024-04-01 NOTE — PROGRESS NOTES
spoke with patient. Asked if patient had time to verbally discuss questions needed for SNAP & MA.     Patient advised was getting ready to leave for [place] for a couple of days to help his brother. Patient asked for a call back,     CMOC asked if Friday was okay and patient consented to that.     Next Outreach 4/5

## 2024-04-01 NOTE — TELEPHONE ENCOUNTER
Pt called she lost her insurance and can't afford ozempic she would like to go back on Metformin or what ever you thing may work. Please advise     I was able to find it on good rx but they only cover the 500, 800, or 1000 not the 750 she is on just an FYI

## 2024-04-04 ENCOUNTER — VBI (OUTPATIENT)
Dept: ADMINISTRATIVE | Facility: OTHER | Age: 64
End: 2024-04-04

## 2024-04-05 ENCOUNTER — PATIENT OUTREACH (OUTPATIENT)
Dept: INTERNAL MEDICINE CLINIC | Facility: CLINIC | Age: 64
End: 2024-04-05

## 2024-04-05 NOTE — PROGRESS NOTES
working with patients .     Patient  asked  to call on Friday.     CMOC called patient left  for a return call    Next Outreach 4/9

## 2024-04-08 ENCOUNTER — PATIENT OUTREACH (OUTPATIENT)
Dept: INTERNAL MEDICINE CLINIC | Facility: CLINIC | Age: 64
End: 2024-04-08

## 2024-04-08 NOTE — PROGRESS NOTES
Update received from CMOC Abby who attempted to outreach patient's  to assist with MA and SNAP applications & refer to [employer] via [employer] if they are interested.  Message was left for patient's  Poli.    OP SWCM attempted to reach Poli to follow-up on above.  No answer, voicemail left, and awaiting return call.

## 2024-04-09 ENCOUNTER — PATIENT OUTREACH (OUTPATIENT)
Dept: INTERNAL MEDICINE CLINIC | Facility: CLINIC | Age: 64
End: 2024-04-09

## 2024-04-12 ENCOUNTER — PATIENT OUTREACH (OUTPATIENT)
Dept: INTERNAL MEDICINE CLINIC | Facility: CLINIC | Age: 64
End: 2024-04-12

## 2024-04-12 NOTE — LETTER
04/12/24    Dear Susy Crain,    I am a Community Health Worker with [facility]  [facility]  [address] [place] [address]  [place] [address] [phone].     I have made several attempts to call you by phone, via [employer]. If you still need assistance with SNAP, or Medicaid you can do that via this website [email or web address]   If you would like apply for Pace, which is for medical prescriptions. That can be done online via [email or web address].     I will be out of the office April 15th returning May 1st. Thank you.        Sincerely,    Abby Barrera

## 2024-04-15 ENCOUNTER — PATIENT OUTREACH (OUTPATIENT)
Dept: INTERNAL MEDICINE CLINIC | Facility: CLINIC | Age: 64
End: 2024-04-15

## 2024-04-15 NOTE — PROGRESS NOTES
2nd outreach attempt to patient's  to discuss MA and SNAP applications.    No answer, voicemail left, and awaiting return call.    Will send Unable to Reach letter to patient via [employer] and close case at this time.

## 2024-04-15 NOTE — LETTER
[address]  [place] [address]  [phone]    Re: Unable to Reach   4/15/2024       Dear Rae,    I tried to reach you by phone on and was unfortunately unable to reach you.  Please return my call if I can further assist in connecting you to any community resources.  Thank you.    Sincerely,         Jelena Barajas      [phone]

## 2024-04-15 NOTE — PROGRESS NOTES
Return call received from patient's  stating they finished helping move his brother to [place].  Patient and patient's  are ready to complete applications for MA & SNAP.  Will route to CMOC Funmilayo to assist with applications; CMOC Abby previously attempted to assist patient but was unable to reach.     Patient's  agreed for OP SWCM to place referrals via FindHelp to local food barnard.  Logged referrals for below food barnard and emailed information to patient's  to review.     Food Pantry by [employer] [place], [place] ([facility])  [place] [employer] [facility] by [facility] Food Pantry by [facility] [employer] [facility]     Will follow.

## 2024-04-29 ENCOUNTER — PATIENT OUTREACH (OUTPATIENT)
Dept: INTERNAL MEDICINE CLINIC | Facility: CLINIC | Age: 64
End: 2024-04-29

## 2024-04-29 NOTE — PROGRESS NOTES
In basket message from [facility] ABDIFATAH Valencia, patient needs assistance with SNAP and MA application process.   CMOC completed chart review prior to calling patient.    CMSARY Mora recently closed CHW, she was unable to contact patient. CMSARY Rivera will open CHW referral and and myself to the care team     CMOC called patient, left voice message explaining the purpose of my call. CMOC requested a return call.      Patient and her spouse returned my call.  CMOC explained my roll, we dicussed the referral. Patient excepts CMOC assistance.    Application for SNAP and MA completed.   e-Form # [id number]  This application was originally started by MINO Mora  3/29/24.    Completed application along with [employer] verification request emailed to patient spouse [email or web address].    CMOC will outreach patient in one weeks time.

## 2024-05-03 ENCOUNTER — PATIENT OUTREACH (OUTPATIENT)
Dept: INTERNAL MEDICINE CLINIC | Facility: CLINIC | Age: 64
End: 2024-05-03

## 2024-05-03 NOTE — PROGRESS NOTES
Update received from [facility] Funmilayo stating MA and SNAP applications were completed through [employer].    Spoke with patient's  who confirmed above.  They will also be going to 2 local food barnard next week after reviewing information that was sent via FindHelp from OP [facility].  Will continue to follow.

## 2024-05-06 ENCOUNTER — PATIENT OUTREACH (OUTPATIENT)
Dept: INTERNAL MEDICINE CLINIC | Facility: CLINIC | Age: 64
End: 2024-05-06

## 2024-05-06 NOTE — PROGRESS NOTES
CMOC called patient for scheduled outreach.     CMOC spoke to patients . He confirms having my email with the list of verification documents needed for SNAP and MA application.    Spouse will submit documents this week.    CMOC will outreach patient in 2 weeks time.

## 2024-05-13 ENCOUNTER — PATIENT OUTREACH (OUTPATIENT)
Dept: INTERNAL MEDICINE CLINIC | Facility: CLINIC | Age: 64
End: 2024-05-13

## 2024-05-13 NOTE — PROGRESS NOTES
CMOC called patient for scheduled outreach.     CMOC spoke with patients spouse. Spouse is working directly with the [employer] since completing MA and SNAP application through [employer].  Spouse denies needing assistance from CMOC with verification documents.    After reviewing patient's chart, and discussing referral needs, it was determined that patient's needs for CHW referral have been met. Patient is aware that CHW referral will be closed as of today. Patient has my contact information and will call CMOC if needed.    CMOC will forward my note to [facility] SW and ask to be removed from the care team.    No further outreach is scheduled at this time.     In basket message from [facility] Sw; Ok to close CHW referral. CMOC will continue working with spouse to apply for PACE.

## 2024-05-14 ENCOUNTER — PATIENT OUTREACH (OUTPATIENT)
Dept: INTERNAL MEDICINE CLINIC | Facility: CLINIC | Age: 64
End: 2024-05-14

## 2024-05-14 NOTE — PROGRESS NOTES
Update received from [facility] Funmilayo.  MA and SNAP applications were completed via [employer].  Patient's spouse to provide needed documents to [employer] for review.  OP SWCM previously provided list of local food barnard to patient's spouse as well.    Call placed to patient's spouse to remind if denied for MA, OP SWCM can assist with Ozempic PAP application.  If approved for MA, patient would be ineligible for Ozempic PAP and cost of Ozempic would be reduced.  Detailed voicemail left.    No other needs known.  Will close case at this time.

## 2024-07-03 ENCOUNTER — TELEPHONE (OUTPATIENT)
Age: 64
End: 2024-07-03

## 2024-07-03 NOTE — TELEPHONE ENCOUNTER
"Pt called. Currently in [place] taking care of her Mother's business transactions. Pt reports that she tested positive for covid on Saturday. Her mother is 96 years old. Pt is concerned about passing the infection. Has been wearing a mask. Pt would like to know if she is still contagious after 5 days? Pt reports low grade fever, \"weird sensations\" to her leg muscle, and green mucous. Pt is unsure if any are related to covid. Pt plans to leave on Tuesday.     Pt requesting call back from Dr Shipley with advice. Thank you.  "

## 2024-07-18 DIAGNOSIS — E11.9 TYPE 2 DIABETES MELLITUS WITHOUT COMPLICATION, WITHOUT LONG-TERM CURRENT USE OF INSULIN (HCC): ICD-10-CM

## 2024-08-05 DIAGNOSIS — I85.00 ESOPHAGEAL VARICES DETERMINED BY ENDOSCOPY (HCC): ICD-10-CM

## 2024-08-06 RX ORDER — CARVEDILOL 6.25 MG/1
6.25 TABLET ORAL 2 TIMES DAILY WITH MEALS
Qty: 14 TABLET | Refills: 0 | Status: SHIPPED | OUTPATIENT
Start: 2024-08-06

## 2024-08-20 DIAGNOSIS — I85.00 ESOPHAGEAL VARICES DETERMINED BY ENDOSCOPY (HCC): ICD-10-CM

## 2024-08-21 RX ORDER — CARVEDILOL 6.25 MG/1
6.25 TABLET ORAL 2 TIMES DAILY WITH MEALS
Qty: 180 TABLET | Refills: 1 | Status: SHIPPED | OUTPATIENT
Start: 2024-08-21

## 2024-10-21 DIAGNOSIS — E78.01 FAMILIAL HYPERCHOLESTEROLEMIA: ICD-10-CM

## 2024-10-21 RX ORDER — ATORVASTATIN CALCIUM 20 MG/1
20 TABLET, FILM COATED ORAL DAILY
Qty: 30 TABLET | Refills: 0 | Status: SHIPPED | OUTPATIENT
Start: 2024-10-21

## 2024-10-24 ENCOUNTER — TELEPHONE (OUTPATIENT)
Age: 64
End: 2024-10-24

## 2024-10-24 DIAGNOSIS — R10.11 RUQ PAIN: ICD-10-CM

## 2024-10-24 DIAGNOSIS — K74.60 CIRRHOSIS OF LIVER WITHOUT ASCITES, UNSPECIFIED HEPATIC CIRRHOSIS TYPE (HCC): Primary | ICD-10-CM

## 2024-10-24 NOTE — TELEPHONE ENCOUNTER
Patients GI provider:  Dr. Jones    Number to return call: [phone]    Reason for call: Pt called in to schedule an appt. Patient states pt is in severe pain and is requesting labs and scans prior to appt scheduled for next week. Patient is requesting a call back at the number above, thank you.    Scheduled procedure/appointment date if applicable: Apt/procedure 10/31/2024

## 2024-10-24 NOTE — TELEPHONE ENCOUNTER
"Last OV 12/8/23 Dr Jones    Spoke with pt. Last night she experienced and episode of \"severe pain 10/10\" in RUQ that radiated down her leg and up her back. She declined the ED recommended by her  at that time.  Pt reports feeling better today pain is now 4/10, w/o alarming S/s. Severe Episode started a few hrs after eating dinner. Pt did not attend 3/4/24 appt due to loss of insurance.     Discussed alarming s/s to report to ED, advised on low fat & bland diet until can be seen by provider scheduled for 10/31/24. Recommended to schedule MRI ordered by Dr Jones on 2/6/24. Pt agreeable to go to ED for immediate evaluation if severity of pain returns & schedule MRI. [facility] msg sent to pt with symptom mgmt recommendations. All questions/concerns addressed to pt's satisfaction.   "

## 2024-10-27 ENCOUNTER — APPOINTMENT (OUTPATIENT)
Dept: LAB | Facility: HOSPITAL | Age: 64
End: 2024-10-27
Payer: MEDICARE

## 2024-10-27 DIAGNOSIS — K74.60 CIRRHOSIS OF LIVER WITHOUT ASCITES, UNSPECIFIED HEPATIC CIRRHOSIS TYPE (HCC): ICD-10-CM

## 2024-10-27 LAB
AFP-TM SERPL-MCNC: 5.09 NG/ML (ref 0–9)
ALBUMIN SERPL BCG-MCNC: 3.8 G/DL (ref 3.5–5)
ALP SERPL-CCNC: 123 U/L (ref 34–104)
ALT SERPL W P-5'-P-CCNC: 23 U/L (ref 7–52)
ANION GAP SERPL CALCULATED.3IONS-SCNC: 5 MMOL/L (ref 4–13)
AST SERPL W P-5'-P-CCNC: 29 U/L (ref 13–39)
BILIRUB SERPL-MCNC: 1.22 MG/DL (ref 0.2–1)
BUN SERPL-MCNC: 16 MG/DL (ref 5–25)
CALCIUM SERPL-MCNC: 9.2 MG/DL (ref 8.4–10.2)
CHLORIDE SERPL-SCNC: 103 MMOL/L (ref 96–108)
CO2 SERPL-SCNC: 28 MMOL/L (ref 21–32)
CREAT SERPL-MCNC: 0.7 MG/DL (ref 0.6–1.3)
ERYTHROCYTE [DISTWIDTH] IN BLOOD BY AUTOMATED COUNT: 12 % (ref 11.6–15.1)
GFR SERPL CREATININE-BSD FRML MDRD: 92 ML/MIN/1.73SQ M
GLUCOSE SERPL-MCNC: 198 MG/DL (ref 65–140)
HCT VFR BLD AUTO: 41.6 % (ref 34.8–46.1)
HGB BLD-MCNC: 14.1 G/DL (ref 11.5–15.4)
INR PPP: 1.19 (ref 0.85–1.19)
MCH RBC QN AUTO: 30.5 PG (ref 26.8–34.3)
MCHC RBC AUTO-ENTMCNC: 33.9 G/DL (ref 31.4–37.4)
MCV RBC AUTO: 90 FL (ref 82–98)
PLATELET # BLD AUTO: 74 THOUSANDS/UL (ref 149–390)
PMV BLD AUTO: 14.6 FL (ref 8.9–12.7)
POTASSIUM SERPL-SCNC: 4.1 MMOL/L (ref 3.5–5.3)
PROT SERPL-MCNC: 6.5 G/DL (ref 6.4–8.4)
PROTHROMBIN TIME: 15.3 SECONDS (ref 12.3–15)
RBC # BLD AUTO: 4.63 MILLION/UL (ref 3.81–5.12)
SODIUM SERPL-SCNC: 136 MMOL/L (ref 135–147)
WBC # BLD AUTO: 5.54 THOUSAND/UL (ref 4.31–10.16)

## 2024-10-27 PROCEDURE — 80053 COMPREHEN METABOLIC PANEL: CPT

## 2024-10-27 PROCEDURE — 85610 PROTHROMBIN TIME: CPT

## 2024-10-27 PROCEDURE — 82105 ALPHA-FETOPROTEIN SERUM: CPT

## 2024-10-27 PROCEDURE — 85027 COMPLETE CBC AUTOMATED: CPT

## 2024-10-27 PROCEDURE — 36415 COLL VENOUS BLD VENIPUNCTURE: CPT

## 2024-10-28 ENCOUNTER — HOSPITAL ENCOUNTER (OUTPATIENT)
Dept: MRI IMAGING | Facility: HOSPITAL | Age: 64
Discharge: HOME | End: 2024-10-28
Attending: STUDENT IN AN ORGANIZED HEALTH CARE EDUCATION/TRAINING PROGRAM
Payer: MEDICARE

## 2024-10-28 DIAGNOSIS — K74.60 CIRRHOSIS OF LIVER WITHOUT ASCITES, UNSPECIFIED HEPATIC CIRRHOSIS TYPE (HCC): ICD-10-CM

## 2024-10-28 DIAGNOSIS — R10.11 RUQ PAIN: ICD-10-CM

## 2024-10-28 PROCEDURE — 74183 MRI ABD W/O CNTR FLWD CNTR: CPT

## 2024-10-28 PROCEDURE — A9585 GADOBUTROL INJECTION: HCPCS | Performed by: STUDENT IN AN ORGANIZED HEALTH CARE EDUCATION/TRAINING PROGRAM

## 2024-10-28 RX ORDER — GADOBUTROL 604.72 MG/ML
10 INJECTION INTRAVENOUS
Status: COMPLETED | OUTPATIENT
Start: 2024-10-28 | End: 2024-10-28

## 2024-10-28 RX ADMIN — GADOBUTROL 10 ML: 604.72 INJECTION INTRAVENOUS at 14:34

## 2024-10-31 ENCOUNTER — OFFICE VISIT (OUTPATIENT)
Age: 64
End: 2024-10-31
Payer: MEDICARE

## 2024-10-31 VITALS
OXYGEN SATURATION: 98 % | HEART RATE: 71 BPM | HEIGHT: 60 IN | WEIGHT: 234 LBS | BODY MASS INDEX: 45.94 KG/M2 | SYSTOLIC BLOOD PRESSURE: 138 MMHG | TEMPERATURE: 98.1 F | DIASTOLIC BLOOD PRESSURE: 78 MMHG

## 2024-10-31 DIAGNOSIS — Z12.11 SCREENING FOR COLON CANCER: ICD-10-CM

## 2024-10-31 DIAGNOSIS — E66.813 CLASS 3 SEVERE OBESITY DUE TO EXCESS CALORIES WITHOUT SERIOUS COMORBIDITY WITH BODY MASS INDEX (BMI) OF 45.0 TO 49.9 IN ADULT (HCC): ICD-10-CM

## 2024-10-31 DIAGNOSIS — E66.01 CLASS 3 SEVERE OBESITY DUE TO EXCESS CALORIES WITHOUT SERIOUS COMORBIDITY WITH BODY MASS INDEX (BMI) OF 45.0 TO 49.9 IN ADULT (HCC): ICD-10-CM

## 2024-10-31 DIAGNOSIS — K74.60 CIRRHOSIS OF LIVER WITHOUT ASCITES, UNSPECIFIED HEPATIC CIRRHOSIS TYPE (HCC): Primary | ICD-10-CM

## 2024-10-31 DIAGNOSIS — I85.00 ESOPHAGEAL VARICES DETERMINED BY ENDOSCOPY (HCC): ICD-10-CM

## 2024-10-31 PROCEDURE — G2211 COMPLEX E/M VISIT ADD ON: HCPCS | Performed by: INTERNAL MEDICINE

## 2024-10-31 PROCEDURE — 99214 OFFICE O/P EST MOD 30 MIN: CPT | Performed by: INTERNAL MEDICINE

## 2024-10-31 NOTE — PROGRESS NOTES
Ambulatory Visit  Name: Susy Crain      : 1960      MRN: [id number]  Encounter Provider: Susu Roque DO  Encounter Date: 10/31/2024   Encounter department: [facility] GASTR[facility]OLOG[facility] [address]    Assessment & Plan  Cirrhosis of liver without ascites, unspecified hepatic cirrhosis type (HCC)  End Stage Liver Disease: Current MELD-3.0 score is 11.  Volume: Ascites/Edema: No issues.   Hepatic Encephalopathy: No issues.   Esophageal variceal surveillance: EGD on 2023 with large varices. Continue Coreg 6.25mg BID. No further screening EGDs needed.   HCC screening/surveillance: MRI with MRCP on 10/2024 pending read. Last MRI in 2024- Two LR3 lesions (1.2cm in segment, 0.8cm in segments) compatible with arterial shunts.   AFP wnl (10/27/24)  Colon Cancer Screening: COY less than 10 years ago without polyps. Cologuard ordered today per patient preference and low risk factors for CRC.   Nutritional status: No significant sarcopenia noted.  Encouraged high protein snacking, low salt diet.  If weight loss evident, will refer to nutritional counseling.      Health Maintenance:  Patient was counseled to avoid alcohol and tobacco products.  Patient was also advised to avoid raw seafood/oysters and from swimming in unchlorinated morales, particularly from the [place], due to increased risk of infection from Vibrio Vulnificus.  Patient was also instructed to seek immediate medical attention should he develop fevers over 101 F, evidence of GI bleeding (black or bloody stools, bloody or coffee ground emesis) or confusion.  Patient was advised to avoid NSAIDs, if possible, due to increase risk of renal dysfunction, and advised that if he should use acetaminophen, dose should not exceed 2 grams/day.  Patient was recommend to remain up to date with adult vaccination, including influenza, pneumovax and meningococcus. Inactivated HSV and zoster vaccine is safe and encouraged by PCP.  Patient was instructed  to call either our office or that of his referring doctor should he develop worsening symptoms related to lower extremity edema, ascites, jaundice or excessive bruising/bleeding.    Orders:    Comprehensive metabolic panel; Future    CBC and Platelet; Future    Protime-INR; Future      History of Present Illness     Susy Crain is a 64 y.o. female with history significant for T2DM, GERD, obesity, hypothyroidism, compensated MASH cirrhosis, whom we are seeing in follow up.     She reports having RUQ pain which she states started about 2 months ago. It is intermittent. Denies yellow eyes/skin, dark urine, GI bleeding, abdominal distention with fluid, lower extremity swelling, easy bruising, excessive bleeding, pruritus or confusion.    COY many years ago was normal. No family history of colon cancer.     History obtained from : patient and patient's Significant Other  Review of Systems   Constitutional:  Negative for appetite change, fatigue and unexpected weight change.   Cardiovascular:  Negative for leg swelling.   Gastrointestinal:  Negative for abdominal distention, abdominal pain, blood in stool, constipation, diarrhea, nausea and vomiting.   Skin:  Negative for color change.   Hematological:  Does not bruise/bleed easily.     Medical History Reviewed by provider this encounter:  Tobacco  Allergies  Meds  Problems  Med Hx  Surg Hx  Fam Hx       Current Outpatient Medications on File Prior to Visit   Medication Sig Dispense Refill    albuterol (PROVENTIL HFA,VENTOLIN HFA) 90 mcg/act inhaler Inhale 1 puff every 6 (six) hours as needed (cough) 8 g 0    atorvastatin (LIPITOR) 20 mg tablet Take 1 tablet (20 mg total) by mouth daily 30 tablet 0    carvedilol (COREG) 6.25 mg tablet TAKE 1 TABLET BY MOUTH TWICE DAILY WITH MEALS 14 tablet 0    hydroxychloroquine (PLAQUENIL) 200 mg tablet Take 200 mg by mouth 2 (two) times a day with meals      metFORMIN (GLUCOPHAGE) 500 mg tablet take 1 tablet by mouth twice a day  with meals 60 tablet 5    ondansetron (ZOFRAN-ODT) 4 mg disintegrating tablet Take 1 tablet (4 mg total) by mouth every 12 (twelve) hours as needed for nausea or vomiting 20 tablet 0    Vitamin D, Cholecalciferol, 25 MCG (1000 UT) TABS Take 2 tablets by mouth once daily 180 tablet 0    albuterol (Proventil HFA) 90 mcg/act inhaler Inhale 2 puffs every 4 (four) hours as needed for wheezing 6.7 g 0    atorvastatin (LIPITOR) 20 mg tablet Take 1 tablet (20 mg total) by mouth daily 90 tablet 1    carvedilol (COREG) 6.25 mg tablet take 1 tablet by mouth twice a day with meals 180 tablet 1    predniSONE 5 mg tablet        No current facility-administered medications on file prior to visit.      Social History     Tobacco Use    Smoking status: Former     Current packs/day: 0.00     Average packs/day: 0.3 packs/day for 10.0 years (2.5 ttl pk-yrs)     Types: Cigarettes     Start date: 1987     Quit date: 1997     Years since quittin.7    Smokeless tobacco: Never   Vaping Use    Vaping status: Never Used   Substance and Sexual Activity    Alcohol use: Not Currently     Alcohol/week: 2.0 standard drinks of alcohol     Types: 2 Glasses of wine per week    Drug use: No    Sexual activity: Not on file         Objective     /78 (BP Location: Left arm, Patient Position: Sitting, Cuff Size: Adult)   Pulse 71   Temp 98.1 °F (36.7 °C) (Tympanic)   Ht 5' (1.524 m)   Wt 106 kg (234 lb)   SpO2 98%   BMI 45.70 kg/m²     Physical Exam  HENT:      Head: Normocephalic and atraumatic.   Eyes:      Conjunctiva/sclera: Conjunctivae normal.   Pulmonary:      Effort: Pulmonary effort is normal.   Abdominal:      General: There is no distension.      Palpations: Abdomen is soft.      Tenderness: There is no abdominal tenderness.   Musculoskeletal:      Right lower leg: No edema.      Left lower leg: No edema.   Neurological:      Mental Status: She is oriented to person, place, and time.   Psychiatric:         Mood and  Affect: Mood normal.

## 2024-11-08 ENCOUNTER — OFFICE VISIT (OUTPATIENT)
Dept: URGENT CARE | Facility: CLINIC | Age: 64
End: 2024-11-08
Payer: MEDICARE

## 2024-11-08 VITALS
RESPIRATION RATE: 18 BRPM | OXYGEN SATURATION: 99 % | TEMPERATURE: 97 F | DIASTOLIC BLOOD PRESSURE: 82 MMHG | HEART RATE: 56 BPM | SYSTOLIC BLOOD PRESSURE: 128 MMHG

## 2024-11-08 DIAGNOSIS — J32.9 SINOBRONCHITIS: Primary | ICD-10-CM

## 2024-11-08 DIAGNOSIS — J40 SINOBRONCHITIS: Primary | ICD-10-CM

## 2024-11-08 PROCEDURE — 99213 OFFICE O/P EST LOW 20 MIN: CPT | Performed by: PHYSICIAN ASSISTANT

## 2024-11-08 PROCEDURE — G0463 HOSPITAL OUTPT CLINIC VISIT: HCPCS | Performed by: PHYSICIAN ASSISTANT

## 2024-11-08 RX ORDER — METHYLPREDNISOLONE 4 MG/1
TABLET ORAL
Qty: 1 EACH | Refills: 0 | Status: SHIPPED | OUTPATIENT
Start: 2024-11-08

## 2024-11-08 RX ORDER — DOXYCYCLINE 100 MG/1
100 TABLET ORAL 2 TIMES DAILY
Qty: 14 TABLET | Refills: 0 | Status: SHIPPED | OUTPATIENT
Start: 2024-11-08 | End: 2024-11-15

## 2024-11-08 NOTE — PATIENT INSTRUCTIONS
Take antibiotic as instructed.  Take Medrol Dos Sen as instructed.    Use your Albuterol inhaler as needed.    Cough expectorant such as Mucinex or Robitussin to help keep mucus thin so it is easier to clear.  Recommend Flonase nasal spray nightly for the next 2 to 3 weeks.    Follow-up with primary care if not improving over the next 7 to 10 days.    If any significant weakness, shortness of breath, chest pain please proceed to emergency room for further evaluation.

## 2024-11-08 NOTE — PROGRESS NOTES
[facility]    NAME: Susy Crain is a 64 y.o. female  : 1960    MRN: [id number]  DATE: 2024  TIME: 1:58 PM    Assessment and Plan   Sinobronchitis [J32.9, J40]  1. Sinobronchitis  methylPREDNISolone 4 MG tablet therapy pack    doxycycline (ADOXA) 100 MG tablet          Patient Instructions     Patient Instructions   Take antibiotic as instructed.  Take Medrol Dos Sen as instructed.    Use your Albuterol inhaler as needed.    Cough expectorant such as Mucinex or Robitussin to help keep mucus thin so it is easier to clear.  Recommend Flonase nasal spray nightly for the next 2 to 3 weeks.    Follow-up with primary care if not improving over the next 7 to 10 days.    If any significant weakness, shortness of breath, chest pain please proceed to emergency room for further evaluation.    Chief Complaint     Chief Complaint   Patient presents with    Cough     Patient started coughing and bringing up colored mucus for the last 2 weeks, she states that she coughs so much that her body hurts and she gets headaches. Negative at home covid test        History of Present Illness   Susy Crain presents to the clinic c/o  64-year-old female comes in with cough and nasal congestion.    Started: 2 weeks ago.  Associated signs and symptoms: Hard coughing, colored phlegm.  No hemoptysis.  Some body aches and pain and headache worse with cough.  Modifying factors: Home COVID test negative.  Known Exposures:  Recent travel:  No.  Hx asthma:  No.  Hx pneumonia:  No.  Smoker: No.  Former.    Is on immune modulator medication for rheumatoid arthritis.  History of type 2 diabetes.  History of cirrhosis.        Review of Systems   Review of Systems   Constitutional:  Positive for activity change and fatigue. Negative for appetite change, chills and fever.   HENT:  Positive for congestion, postnasal drip, rhinorrhea and sinus pressure. Negative for ear discharge, ear pain, sinus pain and sore throat.    Eyes:  Negative.    Respiratory:  Positive for cough, chest tightness and wheezing. Negative for shortness of breath.         Notices some little crackling sounds in chest periodically.  None now.   Cardiovascular: Negative.    Musculoskeletal:  Positive for myalgias.   Neurological:  Positive for headaches.   Hematological: Negative.        Current Medications     Long-Term Medications   Medication Sig Dispense Refill    atorvastatin (LIPITOR) 20 mg tablet Take 1 tablet (20 mg total) by mouth daily 90 tablet 1    atorvastatin (LIPITOR) 20 mg tablet Take 1 tablet (20 mg total) by mouth daily 30 tablet 0    carvedilol (COREG) 6.25 mg tablet TAKE 1 TABLET BY MOUTH TWICE DAILY WITH MEALS 14 tablet 0    carvedilol (COREG) 6.25 mg tablet take 1 tablet by mouth twice a day with meals 180 tablet 1    hydroxychloroquine (PLAQUENIL) 200 mg tablet Take 200 mg by mouth 2 (two) times a day with meals      metFORMIN (GLUCOPHAGE) 500 mg tablet take 1 tablet by mouth twice a day with meals 60 tablet 5    ondansetron (ZOFRAN-ODT) 4 mg disintegrating tablet Take 1 tablet (4 mg total) by mouth every 12 (twelve) hours as needed for nausea or vomiting 20 tablet 0    Vitamin D, Cholecalciferol, 25 MCG (1000 UT) TABS Take 2 tablets by mouth once daily 180 tablet 0       Current Allergies     Allergies as of 11/08/2024 - Reviewed 11/08/2024   Allergen Reaction Noted    Penicillins  01/28/2019    Wellbutrin [bupropion]  01/28/2019          The following portions of the patient's history were reviewed and updated as appropriate: allergies, current medications, past family history, past medical history, past social history, past surgical history and problem list.  Past Medical History:   Diagnosis Date    Depression     Diabetes (HCC)     Diabetes mellitus (HCC)     Fibromyalgia     GERD (gastroesophageal reflux disease)     Hypothyroid     Pneumonia     Shortness of breath      Past Surgical History:   Procedure Laterality Date    KNEE CARTILAGE  SURGERY Right     TRACHEOSTOMY       Family History   Problem Relation Age of Onset    Pneumonia Father     Heart attack Father 67    Asthma Brother     Diabetes Maternal Grandfather     Cancer Paternal Grandmother     Thyroid cancer Neg Hx     Pancreatitis Neg Hx        Objective   /82   Pulse 56   Temp (!) 97 °F (36.1 °C)   Resp 18   SpO2 99%   No LMP recorded. Patient is postmenopausal.       Physical Exam     Physical Exam  Vitals and nursing note reviewed.   Constitutional:       General: She is not in acute distress.     Appearance: She is well-developed. She is ill-appearing. She is not toxic-appearing or diaphoretic.      Comments: Appears mildly ill but in no acute distress.  No trismus or conversational dyspnea.  Pleasant.   accompanies.   HENT:      Head: Normocephalic and atraumatic.      Right Ear: Tympanic membrane, ear canal and external ear normal.      Left Ear: Tympanic membrane, ear canal and external ear normal.      Nose: Congestion present. No rhinorrhea.      Mouth/Throat:      Mouth: Mucous membranes are moist.      Pharynx: No oropharyngeal exudate or posterior oropharyngeal erythema.      Comments: Cobblestoning posterior pharynx   Eyes:      General:         Right eye: No discharge.         Left eye: No discharge.      Conjunctiva/sclera: Conjunctivae normal.      Pupils: Pupils are equal, round, and reactive to light.   Cardiovascular:      Rate and Rhythm: Normal rate and regular rhythm.      Heart sounds: Normal heart sounds. No murmur heard.     No friction rub. No gallop.   Pulmonary:      Effort: Pulmonary effort is normal. No respiratory distress.      Breath sounds: Normal breath sounds. No stridor. No wheezing, rhonchi or rales.      Comments: Some cough with deep breath.  Musculoskeletal:      Cervical back: Normal range of motion and neck supple. No rigidity or tenderness.   Lymphadenopathy:      Cervical: No cervical adenopathy.   Skin:     General: Skin is  warm and dry.      Coloration: Skin is not jaundiced or pale.      Findings: No rash.   Neurological:      General: No focal deficit present.      Mental Status: She is alert and oriented to person, place, and time.   Psychiatric:         Mood and Affect: Mood normal.         Behavior: Behavior normal.

## 2024-11-24 DIAGNOSIS — E78.01 FAMILIAL HYPERCHOLESTEROLEMIA: ICD-10-CM

## 2024-11-25 ENCOUNTER — RA CDI HCC (OUTPATIENT)
Dept: OTHER | Facility: HOSPITAL | Age: 64
End: 2024-11-25

## 2024-11-25 NOTE — PROGRESS NOTES
HCC coding opportunities          Chart Reviewed number of suggestions sent to Provider: 1     Patients Insurance   F33.41  Medicare Insurance: Medicare

## 2024-11-26 RX ORDER — ATORVASTATIN CALCIUM 20 MG/1
20 TABLET, FILM COATED ORAL DAILY
Qty: 30 TABLET | Refills: 0 | Status: SHIPPED | OUTPATIENT
Start: 2024-11-26 | End: 2024-12-03

## 2024-11-29 ENCOUNTER — NURSE TRIAGE (OUTPATIENT)
Age: 64
End: 2024-11-29

## 2024-11-29 NOTE — TELEPHONE ENCOUNTER
LOV:10/31/24    HX:Cirrhosis of liver w/o ascites,unspec type,     Pt and her  calling.  Pt's  states pt has been trying to only take Tylenol for pain, but it is not helping.  Pt has fibromyalgia and RA, and pain meds are prescribed by PCP and Rheum.  Pt is also taking Tramadol, Meloxicam and Percocet, in smaller doses, but now not relieving pain.  Pt also gets nauseated after taking Percocet and is using mike to help alleviate nausea.     Pt's  states all parts of her body hurt.  They are looking for provider's recommendations for something that could help her pain and not be hard on the liver.  I suggested they also speak with PCP and Rheum since they manage her pain, but they would like Dr. Wilkes's advice.      Please advise.     Reason for Disposition   Prescription not at pharmacy and was prescribed by PCP recently  (Exception: triager has access to EMR and prescription is recorded there. Go to Home Care and confirm for pharmacy.)   Caller has medication question about med NOT prescribed by PCP and triager unable to answer question (e.g., compatibility with other med, storage)    Protocols used: Medication Question Call-Adult-[place]

## 2024-12-01 DIAGNOSIS — E78.01 FAMILIAL HYPERCHOLESTEROLEMIA: ICD-10-CM

## 2024-12-02 NOTE — TELEPHONE ENCOUNTER
Called and spoke with patient.Informed her of Dr Wilkes's recommendations that she take 3g or less of tylenol a day for her RA pain and to avoid Meloxicam and other NSAIDS.Patient verbalized understanding.She is concerned that the Tylenol will not give her much pain relief but she will take it as recommended.Patient states she has an apt with a new Rheumatologist in January.She is asking if Tramadol is OK to take with her Cirrhosis and what other medications would you recommend for her RA so she can discuss at her apt.

## 2024-12-03 ENCOUNTER — TELEPHONE (OUTPATIENT)
Dept: FAMILY MEDICINE CLINIC | Facility: CLINIC | Age: 64
End: 2024-12-03

## 2024-12-03 ENCOUNTER — OFFICE VISIT (OUTPATIENT)
Dept: FAMILY MEDICINE CLINIC | Facility: CLINIC | Age: 64
End: 2024-12-03
Payer: COMMERCIAL

## 2024-12-03 VITALS
HEART RATE: 67 BPM | DIASTOLIC BLOOD PRESSURE: 64 MMHG | BODY MASS INDEX: 46.3 KG/M2 | HEIGHT: 60 IN | TEMPERATURE: 98.9 F | OXYGEN SATURATION: 97 % | WEIGHT: 235.8 LBS | SYSTOLIC BLOOD PRESSURE: 130 MMHG

## 2024-12-03 DIAGNOSIS — J44.9 CHRONIC OBSTRUCTIVE PULMONARY DISEASE, UNSPECIFIED COPD TYPE (HCC): ICD-10-CM

## 2024-12-03 DIAGNOSIS — E78.01 FAMILIAL HYPERCHOLESTEROLEMIA: ICD-10-CM

## 2024-12-03 DIAGNOSIS — D69.6 THROMBOCYTOPENIA (HCC): ICD-10-CM

## 2024-12-03 DIAGNOSIS — M79.7 FIBROMYALGIA: ICD-10-CM

## 2024-12-03 DIAGNOSIS — F33.9 RECURRENT MAJOR DEPRESSIVE DISORDER, REMISSION STATUS UNSPECIFIED (HCC): ICD-10-CM

## 2024-12-03 DIAGNOSIS — E11.9 TYPE 2 DIABETES MELLITUS WITHOUT COMPLICATION, WITHOUT LONG-TERM CURRENT USE OF INSULIN (HCC): ICD-10-CM

## 2024-12-03 DIAGNOSIS — Z00.00 WELCOME TO MEDICARE PREVENTIVE VISIT: Primary | ICD-10-CM

## 2024-12-03 DIAGNOSIS — Z78.0 MENOPAUSE: ICD-10-CM

## 2024-12-03 DIAGNOSIS — M05.79 RHEUMATOID ARTHRITIS INVOLVING MULTIPLE SITES WITH POSITIVE RHEUMATOID FACTOR (HCC): ICD-10-CM

## 2024-12-03 LAB
LEFT EYE DIABETIC RETINOPATHY: NORMAL
LEFT EYE IMAGE QUALITY: NORMAL
LEFT EYE MACULAR EDEMA: NORMAL
LEFT EYE OTHER RETINOPATHY: NORMAL
RIGHT EYE DIABETIC RETINOPATHY: NORMAL
RIGHT EYE IMAGE QUALITY: NORMAL
RIGHT EYE MACULAR EDEMA: NORMAL
RIGHT EYE OTHER RETINOPATHY: NORMAL
SEVERITY (EYE EXAM): NORMAL
SL AMB POCT HEMOGLOBIN AIC: 7.9 (ref ?–6.5)

## 2024-12-03 PROCEDURE — 83036 HEMOGLOBIN GLYCOSYLATED A1C: CPT | Performed by: FAMILY MEDICINE

## 2024-12-03 PROCEDURE — 82570 ASSAY OF URINE CREATININE: CPT | Performed by: FAMILY MEDICINE

## 2024-12-03 PROCEDURE — G0439 PPPS, SUBSEQ VISIT: HCPCS | Performed by: FAMILY MEDICINE

## 2024-12-03 PROCEDURE — 82043 UR ALBUMIN QUANTITATIVE: CPT | Performed by: FAMILY MEDICINE

## 2024-12-03 PROCEDURE — 99204 OFFICE O/P NEW MOD 45 MIN: CPT | Performed by: FAMILY MEDICINE

## 2024-12-03 PROCEDURE — 92250 FUNDUS PHOTOGRAPHY W/I&R: CPT | Performed by: FAMILY MEDICINE

## 2024-12-03 RX ORDER — ATORVASTATIN CALCIUM 20 MG/1
20 TABLET, FILM COATED ORAL DAILY
Qty: 90 TABLET | Refills: 0 | Status: SHIPPED | OUTPATIENT
Start: 2024-12-03

## 2024-12-03 RX ORDER — HYDROCODONE BITARTRATE AND ACETAMINOPHEN 5; 325 MG/1; MG/1
1 TABLET ORAL EVERY 6 HOURS PRN
Qty: 30 TABLET | Refills: 0 | Status: SHIPPED | OUTPATIENT
Start: 2024-12-03

## 2024-12-03 RX ORDER — DULOXETIN HYDROCHLORIDE 30 MG/1
30 CAPSULE, DELAYED RELEASE ORAL DAILY
Qty: 30 CAPSULE | Refills: 2 | Status: SHIPPED | OUTPATIENT
Start: 2024-12-03

## 2024-12-03 NOTE — PROGRESS NOTES
Diabetic Foot Exam    Patient's shoes and socks removed.    Right Foot/Ankle   Right Foot Inspection  Skin Exam: skin normal and skin intact. No dry skin, no warmth, no callus, no erythema, no maceration, no abnormal color, no pre-ulcer, no ulcer and no callus.     Toe Exam: ROM and strength within normal limits.     Sensory   Vibration: intact  Proprioception: intact  Monofilament testing: intact    Vascular  Capillary refills: < 3 seconds  The right DP pulse is 2+. The right PT pulse is 2+.     Left Foot/Ankle  Left Foot Inspection  Skin Exam: skin normal and skin intact. No dry skin, no warmth, no erythema, no maceration, normal color, no pre-ulcer, no ulcer and no callus.     Toe Exam: ROM and strength within normal limits.     Sensory   Vibration: intact  Proprioception: intact  Monofilament testing: intact    Vascular  Capillary refills: < 3 seconds  The left DP pulse is 2+. The left PT pulse is 2+.     Assign Risk Category  No deformity present  No loss of protective sensation  No weak pulses  Risk: 0  Name: Susy Crain      : 1960      MRN: [id number]  Encounter Provider: Cheng Dill DO  Encounter Date: 12/3/2024   Encounter department: [facility]    Assessment & Plan  Welcome to Medicare preventive visit  Questionnaire reviewed.  Immunization health screening history updated.  Advance directive in place.       Type 2 diabetes mellitus without complication, without long-term current use of insulin (HCC)    Lab Results   Component Value Date    HGBA1C 7.9 (A) 2024   Currently on metformin for her type 2 diabetes.  Patient does not check sugars regularly.  Due for labs which will be done in the near future.    Orders:    POCT hemoglobin A1c    Albumin / creatinine urine ratio    IRIS Diabetic eye exam    Comprehensive metabolic panel; Future    Hemoglobin A1C; Future    Fibromyalgia  Start Cymbalta 30 mg daily.  Patient may also be a candidate for gabapentin or  Lyrica.  Will also provide limited prescription for Norco  Orders:    HYDROcodone-acetaminophen (Norco) 5-325 mg per tablet; Take 1 tablet by mouth every 6 (six) hours as needed for pain Max Daily Amount: 4 tablets    DULoxetine (CYMBALTA) 30 mg delayed release capsule; Take 1 capsule (30 mg total) by mouth daily    Rheumatoid arthritis involving multiple sites with positive rheumatoid factor (HCC)  Patient will be seeing rheumatology in January.  In the meantime we will continue hydroxychloroquine started by her previous rheumatologist.  Orders:    HYDROcodone-acetaminophen (Norco) 5-325 mg per tablet; Take 1 tablet by mouth every 6 (six) hours as needed for pain Max Daily Amount: 4 tablets    Familial hypercholesterolemia  Recheck lipid panel.  Currently on atorvastatin 20 mg daily  Orders:    Lipid Panel with Direct LDL reflex; Future    TSH, 3rd generation with Free T4 reflex; Future    Menopause    Orders:    DXA bone density spine hip and pelvis; Future    Recurrent major depressive disorder, remission status unspecified (HCC)  Depression Screening Follow-up Plan: Patient's depression screening was positive with a PHQ-9 score of 17. Patient advised to follow-up with PCP for further management.    Orders:    DULoxetine (CYMBALTA) 30 mg delayed release capsule; Take 1 capsule (30 mg total) by mouth daily    Chronic obstructive pulmonary disease, unspecified COPD type (HCC)         Thrombocytopenia (HCC)           Depression Screening and Follow-up Plan: Patient's depression screening was positive with a PHQ-9 score of 17.       Preventive health issues were discussed with patient, and age appropriate screening tests were ordered as noted in patient's After Visit Summary. Personalized health advice and appropriate referrals for health education or preventive services given if needed, as noted in patient's After Visit Summary.    History of Present Illness     Patient presents for an introductory visit as well as  welcome to Medicare physical.  She is also here for review of her chronic medical problems.  Patient's being treated for type 2 diabetes, hyperlipidemia, Sommer with esophageal varices, COPD, depression and rheumatoid arthritis.  She also has a diagnosis of fibromyalgia and chronic thrombocytopenia.  Her most pressing complaint today is chronic pain which relates to her fibromyalgia as well as her rheumatoid arthritis.  She has been advised to limit her Tylenol dosage to less than 3 g/day.  She has been told to avoid NSAIDs.  She has tried tramadol with minimal benefit.  She has also tried oxycodone with some benefit but nausea       Patient Care Team:  Cheng Dill DO as PCP - General (Family Medicine)  Nicho Mann DO as PCP - [facility]  Cheng Dill DO as PCP - PCP-[facility])  Carlos Shipley DO (Internal Medicine)    Review of Systems   Constitutional:  Positive for fatigue.   Respiratory: Negative.     Cardiovascular: Negative.    Gastrointestinal: Negative.    Genitourinary: Negative.    Musculoskeletal:  Positive for arthralgias and myalgias.   Psychiatric/Behavioral:  Positive for dysphoric mood.      Medical History Reviewed by provider this encounter:       Annual Wellness Visit Questionnaire   Susy is here for her Welcome to Medicare visit.     Health Risk Assessment:   Patient rates overall health as poor. Patient feels that their physical health rating is much worse. Patient is very dissatisfied with their life. Eyesight was rated as slightly worse. Hearing was rated as same. Patient feels that their emotional and mental health rating is much worse. Patients states they are often angry. Patient states they are always unusually tired/fatigued. Pain experienced in the last 7 days has been a lot. Patient's pain rating has been 9/10. Patient states that she has experienced no weight loss or gain in last 6 months.     Depression Screening:   PHQ-9 Score: 17       Fall Risk Screening:   In the past year, patient has experienced: history of falling in past year    Number of falls: 2 or more  Injured during fall?: Yes    Feels unsteady when standing or walking?: Yes    Worried about falling?: No      Urinary Incontinence Screening:   Patient has not leaked urine accidently in the last six months.     Home Safety:  Patient has trouble with stairs inside or outside of their home. Patient has working smoke alarms and has working carbon monoxide detector. Home safety hazards include: loose rugs on the floor.     Nutrition:   Current diet is Regular.     Medications:   Patient is currently taking over-the-counter supplements. OTC medications include: see medication list. Patient is not able to manage medications.     Activities of Daily Living (ADLs)/Instrumental Activities of Daily Living (IADLs):   Walk and transfer into and out of bed and chair?: No  Dress and groom yourself?: No    Bathe or shower yourself?: Yes    Feed yourself? Yes  Do your laundry/housekeeping?: No  Manage your money, pay your bills and track your expenses?: Yes  Make your own meals?: Yes    Do your own shopping?: No    Previous Hospitalizations:   Any hospitalizations or ED visits within the last 12 months?: Yes    How many hospitalizations have you had in the last year?: 1-2    Advance Care Planning:   Living will: No    Durable POA for healthcare: No    ACP document given: Yes      Cognitive Screening:   Provider or family/friend/caregiver concerned regarding cognition?: No    PREVENTIVE SCREENINGS      Cardiovascular Screening:    General: Screening Not Indicated and History Lipid Disorder      Diabetes Screening:     General: Screening Not Indicated and History Diabetes      Colorectal Cancer Screening:     General: Screening Current      Lung Cancer Screening:     General: Screening Not Indicated      Hepatitis C Screening:    General: Screening Current    Screening, Brief Intervention, and  Referral to Treatment (SBIRT)    Screening  Typical number of drinks in a day: 0  Typical number of drinks in a week: 0  Interpretation: Low risk drinking behavior.    Single Item Drug Screening:  How often have you used an illegal drug (including marijuana) or a prescription medication for non-medical reasons in the past year? never    Single Item Drug Screen Score: 0  Interpretation: Negative screen for possible drug use disorder    Review of Current Opioid Use    Opioid Risk Tool (ORT) Interpretation: Complete Opioid Risk Tool (ORT)       Vision Screening    Right eye Left eye Both eyes   Without correction 20/70 20/40 20/40   With correction          Objective   There were no vitals taken for this visit.    Physical Exam  Vitals and nursing note reviewed.   Constitutional:       General: She is not in acute distress.     Appearance: Normal appearance.   HENT:      Head: Normocephalic and atraumatic.   Eyes:      Pupils: Pupils are equal, round, and reactive to light.   Cardiovascular:      Rate and Rhythm: Normal rate and regular rhythm.      Pulses: Normal pulses. no weak pulses.           Dorsalis pedis pulses are 2+ on the right side and 2+ on the left side.        Posterior tibial pulses are 2+ on the right side and 2+ on the left side.      Heart sounds: Normal heart sounds.   Pulmonary:      Effort: Pulmonary effort is normal.      Breath sounds: Normal breath sounds.   Musculoskeletal:         General: Normal range of motion.      Cervical back: Normal range of motion.   Feet:      Right foot:      Skin integrity: No ulcer, skin breakdown, erythema, warmth, callus or dry skin.      Left foot:      Skin integrity: No ulcer, skin breakdown, erythema, warmth, callus or dry skin.   Skin:     General: Skin is warm and dry.   Neurological:      General: No focal deficit present.      Mental Status: She is alert and oriented to person, place, and time. Mental status is at baseline.   Psychiatric:         Mood  and Affect: Mood normal.         Behavior: Behavior normal.         Thought Content: Thought content normal.         Judgment: Judgment normal.

## 2024-12-03 NOTE — ASSESSMENT & PLAN NOTE
Recheck lipid panel.  Currently on atorvastatin 20 mg daily  Orders:    Lipid Panel with Direct LDL reflex; Future    TSH, 3rd generation with Free T4 reflex; Future

## 2024-12-03 NOTE — TELEPHONE ENCOUNTER
Pt did not stop at check out after appointment on 12/3, pt was to schedule 4 week follow up (OVL per PCP). Unable to schedule appointment.

## 2024-12-03 NOTE — ASSESSMENT & PLAN NOTE
Patient will be seeing rheumatology in January.  In the meantime we will continue hydroxychloroquine started by her previous rheumatologist.  Orders:    HYDROcodone-acetaminophen (Norco) 5-325 mg per tablet; Take 1 tablet by mouth every 6 (six) hours as needed for pain Max Daily Amount: 4 tablets

## 2024-12-03 NOTE — PATIENT INSTRUCTIONS
Medicare Preventive Visit Patient Instructions  Thank you for completing your Welcome to Medicare Visit or Medicare Annual Wellness Visit today. Your next wellness visit will be due in one year (12/4/2025).  The screening/preventive services that you may require over the next 5-10 years are detailed below. Some tests may not apply to you based off risk factors and/or age. Screening tests ordered at today's visit but not completed yet may show as past due. Also, please note that scanned in results may not display below.  Preventive Screenings:  Service Recommendations Previous Testing/Comments   Colorectal Cancer Screening  * Colonoscopy    * Fecal Occult Blood Test (FOBT)/Fecal Immunochemical Test (FIT)  * Fecal DNA/Cologuard Test  * Flexible Sigmoidoscopy Age: 45-75 years old   Colonoscopy: every 10 years (may be performed more frequently if at higher risk)  OR  FOBT/FIT: every 1 year  OR  Cologuard: every 3 years  OR  Sigmoidoscopy: every 5 years  Screening may be recommended earlier than age 45 if at higher risk for colorectal cancer. Also, an individualized decision between you and your healthcare provider will decide whether screening between the ages of 76-85 would be appropriate. Colonoscopy: 07/06/2020  FOBT/FIT: Not on file  Cologuard: 02/23/2022  Sigmoidoscopy: Not on file    Screening Current     Breast Cancer Screening Age: 40+ years old  Frequency: every 1-2 years  Not required if history of left and right mastectomy Mammogram: 01/29/2019        Cervical Cancer Screening Between the ages of 21-29, pap smear recommended once every 3 years.   Between the ages of 30-65, can perform pap smear with HPV co-testing every 5 years.   Recommendations may differ for women with a history of total hysterectomy, cervical cancer, or abnormal pap smears in past. Pap Smear: Not on file        Hepatitis C Screening Once for adults born between 1945 and 1965  More frequently in patients at high risk for Hepatitis C Hep C  Antibody: Not on file    Screening Current   Diabetes Screening 1-2 times per year if you're at risk for diabetes or have pre-diabetes Fasting glucose: 180 mg/dL (1/13/2024)  A1C: 7.9 (12/3/2024)  Screening Not Indicated  History Diabetes   Cholesterol Screening Once every 5 years if you don't have a lipid disorder. May order more often based on risk factors. Lipid panel: 03/09/2021    Screening Not Indicated  History Lipid Disorder     Other Preventive Screenings Covered by Medicare:  Abdominal Aortic Aneurysm (AAA) Screening: covered once if your at risk. You're considered to be at risk if you have a family history of AAA.  Lung Cancer Screening: covers low dose CT scan once per year if you meet all of the following conditions: (1) Age 55-77; (2) No signs or symptoms of lung cancer; (3) Current smoker or have quit smoking within the last 15 years; (4) You have a tobacco smoking history of at least 20 pack years (packs per day multiplied by number of years you smoked); (5) You get a written order from a healthcare provider.  Glaucoma Screening: covered annually if you're considered high risk: (1) You have diabetes OR (2) Family history of glaucoma OR (3)  aged 50 and older OR (4)  American aged 65 and older  Osteoporosis Screening: covered every 2 years if you meet one of the following conditions: (1) You're estrogen deficient and at risk for osteoporosis based off medical history and other findings; (2) Have a vertebral abnormality; (3) On glucocorticoid therapy for more than 3 months; (4) Have primary hyperparathyroidism; (5) On osteoporosis medications and need to assess response to drug therapy.   Last bone density test (DXA Scan): Not on file.  HIV Screening: covered annually if you're between the age of 15-65. Also covered annually if you are younger than 15 and older than 65 with risk factors for HIV infection. For pregnant patients, it is covered up to 3 times per  pregnancy.    Immunizations:  Immunization Recommendations   Influenza Vaccine Annual influenza vaccination during flu season is recommended for all persons aged >= 6 months who do not have contraindications   Pneumococcal Vaccine   * Pneumococcal conjugate vaccine = PCV13 (Prevnar 13), PCV15 (Vaxneuvance), PCV20 (Prevnar 20)  * Pneumococcal polysaccharide vaccine = PPSV23 (Pneumovax) Adults 19-63 yo with certain risk factors or if 65+ yo  If never received any pneumonia vaccine: recommend Prevnar 20 (PCV20)  Give PCV20 if previously received 1 dose of PCV13 or PPSV23   Hepatitis B Vaccine 3 dose series if at intermediate or high risk (ex: diabetes, end stage renal disease, liver disease)   Respiratory syncytial virus (RSV) Vaccine - COVERED BY MEDICARE PART D  * RSVPreF3 (Arexvy) CDC recommends that adults 60 years of age and older may receive a single dose of RSV vaccine using shared clinical decision-making (SCDM)   Tetanus (Td) Vaccine - COST NOT COVERED BY MEDICARE PART B Following completion of primary series, a booster dose should be given every 10 years to maintain immunity against tetanus. Td may also be given as tetanus wound prophylaxis.   Tdap Vaccine - COST NOT COVERED BY MEDICARE PART B Recommended at least once for all adults. For pregnant patients, recommended with each pregnancy.   Shingles Vaccine (Shingrix) - COST NOT COVERED BY MEDICARE PART B  2 shot series recommended in those 19 years and older who have or will have weakened immune systems or those 50 years and older     Health Maintenance Due:      Topic Date Due   • Cervical Cancer Screening  Never done   • Breast Cancer Screening: Mammogram  02/01/2025 (Originally 1/29/2020)   • HIV Screening  02/13/2075 (Originally [birth year]1976)   • Colorectal Cancer Screening  02/23/2025   • Hepatitis C Screening  Completed     Immunizations Due:      Topic Date Due   • Pneumococcal Vaccine: Pediatrics (0 to 5 Years) and At-Risk Patients (6 to 64 Years) (2  of 2 - PCV) 01/28/2020   • Hepatitis B Vaccine (3 of 3 - Risk 3-dose series) 03/24/2024   • Hepatitis A Vaccine (2 of 2 - Risk 2-dose series) 03/24/2024   • Influenza Vaccine (1) Never done   • COVID-19 Vaccine (4 - 2024-25 season) 09/01/2024     Advance Directives   What are advance directives?  Advance directives are legal documents that state your wishes and plans for medical care. These plans are made ahead of time in case you lose your ability to make decisions for yourself. Advance directives can apply to any medical decision, such as the treatments you want, and if you want to donate organs.   What are the types of advance directives?  There are many types of advance directives, and each state has rules about how to use them. You may choose a combination of any of the following:  Living will:  This is a written record of the treatment you want. You can also choose which treatments you do not want, which to limit, and which to stop at a certain time. This includes surgery, medicine, IV fluid, and tube feedings.   Durable power of  for healthcare (DPAHC):  This is a written record that states who you want to make healthcare choices for you when you are unable to make them for yourself. This person, called a proxy, is usually a family member or a friend. You may choose more than 1 proxy.  Do not resuscitate (DNR) order:  A DNR order is used in case your heart stops beating or you stop breathing. It is a request not to have certain forms of treatment, such as CPR. A DNR order may be included in other types of advance directives.  Medical directive:  This covers the care that you want if you are in a coma, near death, or unable to make decisions for yourself. You can list the treatments you want for each condition. Treatment may include pain medicine, surgery, blood transfusions, dialysis, IV or tube feedings, and a ventilator (breathing machine).  Values history:  This document has questions about your  views, beliefs, and how you feel and think about life. This information can help others choose the care that you would choose.  Why are advance directives important?  An advance directive helps you control your care. Although spoken wishes may be used, it is better to have your wishes written down. Spoken wishes can be misunderstood, or not followed. Treatments may be given even if you do not want them. An advance directive may make it easier for your family to make difficult choices about your care.   Weight Management   Why it is important to manage your weight:  Being overweight increases your risk of health conditions such as heart disease, high blood pressure, type 2 diabetes, and certain types of cancer. It can also increase your risk for osteoarthritis, sleep apnea, and other respiratory problems. Aim for a slow, steady weight loss. Even a small amount of weight loss can lower your risk of health problems.  How to lose weight safely:  A safe and healthy way to lose weight is to eat fewer calories and get regular exercise. You can lose up about 1 pound a week by decreasing the number of calories you eat by 500 calories each day.   Healthy meal plan for weight management:  A healthy meal plan includes a variety of foods, contains fewer calories, and helps you stay healthy. A healthy meal plan includes the following:  Eat whole-grain foods more often.  A healthy meal plan should contain fiber. Fiber is the part of grains, fruits, and vegetables that is not broken down by your body. Whole-grain foods are healthy and provide extra fiber in your diet. Some examples of whole-grain foods are whole-wheat breads and pastas, oatmeal, brown rice, and bulgur.  Eat a variety of vegetables every day.  Include dark, leafy greens such as spinach, kale, nuha greens, and mustard greens. Eat yellow and orange vegetables such as carrots, sweet potatoes, and winter squash.   Eat a variety of fruits every day.  Choose fresh or  canned fruit (canned in its own juice or light syrup) instead of juice. Fruit juice has very little or no fiber.  Eat low-fat dairy foods.  Drink fat-free (skim) milk or 1% milk. Eat fat-free yogurt and low-fat cottage cheese. Try low-fat cheeses such as mozzarella and other reduced-fat cheeses.  Choose meat and other protein foods that are low in fat.  Choose beans or other legumes such as split peas or lentils. Choose fish, skinless poultry (chicken or turkey), or lean cuts of red meat (beef or pork). Before you cook meat or poultry, cut off any visible fat.   Use less fat and oil.  Try baking foods instead of frying them. Add less fat, such as margarine, sour cream, regular salad dressing and mayonnaise to foods. Eat fewer high-fat foods. Some examples of high-fat foods include french fries, doughnuts, ice cream, and cakes.  Eat fewer sweets.  Limit foods and drinks that are high in sugar. This includes candy, cookies, regular soda, and sweetened drinks.  Exercise:  Exercise at least 30 minutes per day on most days of the week. Some examples of exercise include walking, biking, dancing, and swimming. You can also fit in more physical activity by taking the stairs instead of the elevator or parking farther away from stores. Ask your healthcare provider about the best exercise plan for you.   Narcotic (Opioid) Safety    Use narcotics safely:  Take prescribed narcotics exactly as directed  Do not give narcotics to others or take narcotics that belong to someone else  Do not mix narcotics without medicines or alcohol  Do not drive or operate heavy machinery after you take the narcotic  Monitor for side effects and notify your healthcare provider if you experienced side effects such as nausea, sleepiness, itching, or trouble thinking clearly.    Manage constipation:    Constipation is the most common side effect of narcotic medicine. Constipation is when you have hard, dry bowel movements, or you go longer than usual  between bowel movements. Tell your healthcare provider about all changes in your bowel movements while you are taking narcotics. He or she may recommend laxative medicine to help you have a bowel movement. He or she may also change the kind of narcotic you are taking, or change when you take it. The following are more ways you can prevent or relieve constipation:    Drink liquids as directed.  You may need to drink extra liquids to help soften and move your bowels. Ask how much liquid to drink each day and which liquids are best for you.  Eat high-fiber foods.  This may help decrease constipation by adding bulk to your bowel movements. High-fiber foods include fruits, vegetables, whole-grain breads and cereals, and beans. Your healthcare provider or dietitian can help you create a high-fiber meal plan. Your provider may also recommend a fiber supplement if you cannot get enough fiber from food.  Exercise regularly.  Regular physical activity can help stimulate your intestines. Walking is a good exercise to prevent or relieve constipation. Ask which exercises are best for you.  Schedule a time each day to have a bowel movement.  This may help train your body to have regular bowel movements. Bend forward while you are on the toilet to help move the bowel movement out. Sit on the toilet for at least 10 minutes, even if you do not have a bowel movement.    Store narcotics safely:   Store narcotics where others cannot easily get them.  Keep them in a locked cabinet or secure area. Do not  keep them in a purse or other bag you carry with you. A person may be looking for something else and find the narcotics.  Make sure narcotics are stored out of the reach of children.  A child can easily overdose on narcotics. Narcotics may look like candy to a small child.    The best way to dispose of narcotics:      The laws vary by country and area. In the [place], the best way is to return the narcotics through a take-back  program. This program is offered by the [place] Drug Enforcement Agency (VILMA). The following are options for using the program:  Take the narcotics to a VILMA collection site.  The site is often a law enforcement center. Call your local law enforcement center for scheduled take-back days in your area. You will be given information on where to go if the collection site is in a different location.  Take the narcotics to an approved pharmacy or hospital.  A pharmacy or hospital may be set up as a collection site. You will need to ask if it is a VILMA collection site if you were not directed there. A pharmacy or doctor's office may not be able to take back narcotics unless it is a VILMA site.  Use a mail-back system.  This means you are given containers to put the narcotics into. You will then mail them in the containers.  Use a take-back drop box.  This is a place to leave the narcotics at any time. People and animals will not be able to get into the box. Your local law enforcement agency can tell you where to find a drop box in your area.    Other ways to manage pain:   Ask your healthcare provider about non-narcotic medicines to control pain.  Nonprescription medicines include NSAIDs (such as ibuprofen) and acetaminophen. Prescription medicines include muscle relaxers, antidepressants, and steroids.  Pain may be managed without any medicines.  Some ways to relieve pain include massage, aromatherapy, or meditation. Physical or occupational therapy may also help.    For more information:   Drug Enforcement Administration  [address] [place] [address]  Phone: 5- 325 - 848-9826  Web Address: https://www.deadiversion.[facility].gov/drug_disposal/    US Food and Drug Administration  [address] [place] [address]  Phone: 7- 056 - 884-2924  Web Address: http://www.fda.gov     © Copyright [employer] 2018 Information is for End User's use only and may not be sold, redistributed or otherwise used  for commercial purposes. All illustrations and images included in CareNotes® are the copyrighted property of A.D.A.M., Inc. or [employer]

## 2024-12-03 NOTE — ASSESSMENT & PLAN NOTE
Start Cymbalta 30 mg daily.  Patient may also be a candidate for gabapentin or Lyrica.  Will also provide limited prescription for Norco  Orders:    HYDROcodone-acetaminophen (Norco) 5-325 mg per tablet; Take 1 tablet by mouth every 6 (six) hours as needed for pain Max Daily Amount: 4 tablets    DULoxetine (CYMBALTA) 30 mg delayed release capsule; Take 1 capsule (30 mg total) by mouth daily

## 2024-12-03 NOTE — ASSESSMENT & PLAN NOTE
Lab Results   Component Value Date    HGBA1C 7.9 (A) 12/03/2024   Currently on metformin for her type 2 diabetes.  Patient does not check sugars regularly.  Due for labs which will be done in the near future.    Orders:    POCT hemoglobin A1c    Albumin / creatinine urine ratio    IRIS Diabetic eye exam    Comprehensive metabolic panel; Future    Hemoglobin A1C; Future

## 2024-12-04 LAB
CREAT UR-MCNC: 90.9 MG/DL
MICROALBUMIN UR-MCNC: 10.1 MG/L
MICROALBUMIN/CREAT 24H UR: 11 MG/G CREATININE (ref 0–30)

## 2024-12-10 ENCOUNTER — TELEPHONE (OUTPATIENT)
Age: 64
End: 2024-12-10

## 2024-12-10 NOTE — TELEPHONE ENCOUNTER
Left voicemail message today @ [phone], informing Pt that her appointment with KAMLESH Gayle has been rescheduled for 4/16/25 at [address] [place] [address] office location.  Appointment letter mailed to Pt's address listed in Pt's chart.

## 2024-12-16 ENCOUNTER — TELEPHONE (OUTPATIENT)
Age: 64
End: 2024-12-16

## 2024-12-16 NOTE — TELEPHONE ENCOUNTER
Pt called to request a referral for Rheumatology. Pt has an appointment with Rheum on 1/6/25. Please advise when referral is in chart

## 2024-12-17 DIAGNOSIS — M05.79 RHEUMATOID ARTHRITIS INVOLVING MULTIPLE SITES WITH POSITIVE RHEUMATOID FACTOR (HCC): ICD-10-CM

## 2024-12-17 DIAGNOSIS — M79.7 FIBROMYALGIA: Primary | ICD-10-CM

## 2024-12-17 NOTE — TELEPHONE ENCOUNTER
Liban Diaz Patient Age: 42 year old  MESSAGE:   Patient calling to reschedule colonoscopy.  Call transferred to scheduling.       WEIGHT AND HEIGHT:   Wt Readings from Last 1 Encounters:   06/26/23 73.9 kg (163 lb)     Ht Readings from Last 1 Encounters:   06/26/23 5' 11\" (1.803 m)     BMI Readings from Last 1 Encounters:   06/26/23 22.73 kg/m²       ALLERGIES:  Patient has no known allergies.  Current Outpatient Medications   Medication Sig Dispense Refill   • Multiple Vitamin (MULTIVITAMIN PO)      • Omega-3 Fatty Acids (FISH OIL PO)        No current facility-administered medications for this visit.     PHARMACY to use:           Pharmacy preference(s) on file:   [id number] - [place], [place] - [address] [address] [facility]  [address] [facility] [address]  Phone: [phone] Fax: [phone]      CALL BACK INFO: Ok to leave response (including medical information) with family member or on answering machine  ROUTING: Patient's physician/staff        PCP: Liban Merlos DO         INS: Payor:  [employer] / Plan:  BE BCD BEV20 / Product Type:  GOLD   PATIENT ADDRESS:  [address] [place] [address]   [place] [address]   Physician referral placed for rheumatology but patient may also need insurance referral.

## 2024-12-26 DIAGNOSIS — M05.79 RHEUMATOID ARTHRITIS INVOLVING MULTIPLE SITES WITH POSITIVE RHEUMATOID FACTOR (HCC): ICD-10-CM

## 2024-12-26 DIAGNOSIS — M79.7 FIBROMYALGIA: ICD-10-CM

## 2024-12-27 RX ORDER — HYDROCODONE BITARTRATE AND ACETAMINOPHEN 5; 325 MG/1; MG/1
1 TABLET ORAL EVERY 6 HOURS PRN
Qty: 30 TABLET | Refills: 0 | Status: SHIPPED | OUTPATIENT
Start: 2024-12-27

## 2024-12-27 RX ORDER — HYDROXYCHLOROQUINE SULFATE 200 MG/1
200 TABLET, FILM COATED ORAL 2 TIMES DAILY WITH MEALS
Qty: 60 TABLET | Refills: 3 | Status: SHIPPED | OUTPATIENT
Start: 2024-12-27 | End: 2025-04-26

## 2024-12-30 NOTE — PROGRESS NOTES
Rheumatology Initial Outpatient Visit  Name: Susy Crain      : 1960      MRN: [id number]  Encounter Provider: Raisa Kessler MD  Encounter Date: 2025   Encounter department: [facility] [address]  :  Assessment & Plan  Rheumatoid arthritis involving multiple sites with positive rheumatoid factor (HCC)  Susy Crain is a 64 y.o. female who presents for evaluation of rheumatoid arthritis. Patient previously following with Dr. Jefferson.  Patient was initially diagnosed with seropositive rheumatoid arthritis around  when she presented with inflammatory arthritis.  Labs were significant for  and CCP greater than 250.  Patient also diagnosed with secondary Sjogren's due to sicca symptoms.  Previous treatments include Remicade (lost efficacy), methotrexate (hair loss, nausea, diarrhea), leflunomide (no response), Orencia (1 dose and developed oral and lip ulcers). She has been maintained on HCQ.    With history of cirrhosis, limits the trial of other DMARDs. As, she did not have active synovitis on POCUS today, would not initiate any DMARDs. Will continue the hydroxychloroquine. Intra-articular steroid injections were administered today into bilateral knees for underlying OA. In the future, may consider a trial of rituximab or abatacept with least risk of injury to liver compared to other DMARDs    Orders:    hydroxychloroquine (PLAQUENIL) 200 mg tablet; Take 1 tablet (200 mg total) by mouth 2 (two) times a day with meals        History of Present Illness   HPI  Susy Crain is a 64 y.o. female who presents for evaluation of rheumatoid arthritis and fibromyalgia.  Patient previously following with Dr. Jefferson.  Patient was initially diagnosed with seropositive rheumatoid arthritis around  when she presented with inflammatory arthritis.  Labs were significant for  and CCP greater than 250.  Patient also diagnosed with secondary Sjogren's due to sicca symptoms.  Previous  "treatments include Remicade (lost efficacy), methotrexate (hair loss, nausea, diarrhea), leflunomide (no response), Orencia (1 dose and developed oral and lip ulcers). She has been maintained on HCQ. OF note, also diagnosed with cirrhosis and has chronic thrombocytopenia. Patient also Hep B core ab positive --> Saw GI and no suspicion for active Hep B.     Today, she reports arthralgias in almost every joint, worse in the knee. Reports morning stiffness in hands lasting for more than 2 hours.    Review of Systems  Complete ROS conducted as per HPI. In addition, denies:  Fever  Photosensitive rash  Sicca symptoms  Recurrent oral ulcers  Muscle weakness  Uveitis  Dactylitis  Chest pain  SOB  Pleurisy  Gross hematuria  Foamy urine  Raynaud's  Joint issues other than noted above      Objective   /64 (BP Location: Right arm, Patient Position: Sitting, Cuff Size: Adult)   Pulse 66   Temp (!) 96.3 °F (35.7 °C) (Tympanic)   Ht 5' 0.25\" (1.53 m)   Wt 103 kg (228 lb)   SpO2 97%   BMI 44.16 kg/m²      Physical Exam  Physical Exam  Constitutional: well appearing, no acute distress  HEENT: normocephalic, sclera clear, no visible oral or nasal ulcers  Neck: supple, no palpable cervical adenopathy  CV: regular rate and rhythm, no murmur  Pulm: normal respiratory effort, lungs clear to auscultation b/l  Skin: no rashes, no skin thickening  Extremities: warm and well perfused, no edema  MSK:  No active synovitis on joint exam today    POCUS showed findings of synovial hypertrophy in bilateral MCPs with no active synovial hyperemia    Labs and Imaging  I have personally reviewed pertinent labs and imaging.   "

## 2025-01-06 ENCOUNTER — OFFICE VISIT (OUTPATIENT)
Age: 65
End: 2025-01-06

## 2025-01-06 VITALS
HEIGHT: 60 IN | BODY MASS INDEX: 44.76 KG/M2 | WEIGHT: 228 LBS | SYSTOLIC BLOOD PRESSURE: 134 MMHG | TEMPERATURE: 96.3 F | DIASTOLIC BLOOD PRESSURE: 64 MMHG | OXYGEN SATURATION: 97 % | HEART RATE: 66 BPM

## 2025-01-06 DIAGNOSIS — M05.79 RHEUMATOID ARTHRITIS INVOLVING MULTIPLE SITES WITH POSITIVE RHEUMATOID FACTOR (HCC): ICD-10-CM

## 2025-01-06 DIAGNOSIS — M79.7 FIBROMYALGIA: ICD-10-CM

## 2025-01-06 DIAGNOSIS — M17.10 PRIMARY LOCALIZED OSTEOARTHRITIS OF KNEE: Primary | ICD-10-CM

## 2025-01-06 RX ORDER — LIDOCAINE HYDROCHLORIDE 10 MG/ML
1 INJECTION, SOLUTION INFILTRATION; PERINEURAL
Status: COMPLETED | OUTPATIENT
Start: 2025-01-06 | End: 2025-01-06

## 2025-01-06 RX ORDER — TRIAMCINOLONE ACETONIDE 40 MG/ML
40 INJECTION, SUSPENSION INTRA-ARTICULAR; INTRAMUSCULAR
Status: COMPLETED | OUTPATIENT
Start: 2025-01-06 | End: 2025-01-06

## 2025-01-06 RX ORDER — HYDROXYCHLOROQUINE SULFATE 200 MG/1
200 TABLET, FILM COATED ORAL 2 TIMES DAILY WITH MEALS
Qty: 60 TABLET | Refills: 3 | Status: SHIPPED | OUTPATIENT
Start: 2025-01-06 | End: 2025-05-06

## 2025-01-06 RX ADMIN — TRIAMCINOLONE ACETONIDE 40 MG: 40 INJECTION, SUSPENSION INTRA-ARTICULAR; INTRAMUSCULAR at 14:30

## 2025-01-06 RX ADMIN — LIDOCAINE HYDROCHLORIDE 1 ML: 10 INJECTION, SOLUTION INFILTRATION; PERINEURAL at 14:30

## 2025-01-06 NOTE — ASSESSMENT & PLAN NOTE
Susy Crain is a 64 y.o. female who presents for evaluation of rheumatoid arthritis. Patient previously following with Dr. Jefferson.  Patient was initially diagnosed with seropositive rheumatoid arthritis around 2020 when she presented with inflammatory arthritis.  Labs were significant for  and CCP greater than 250.  Patient also diagnosed with secondary Sjogren's due to sicca symptoms.  Previous treatments include Remicade (lost efficacy), methotrexate (hair loss, nausea, diarrhea), leflunomide (no response), Orencia (1 dose and developed oral and lip ulcers). She has been maintained on HCQ.    With history of cirrhosis, limits the trial of other DMARDs. As, she did not have active synovitis on POCUS today, would not initiate any DMARDs. Will continue the hydroxychloroquine. Intra-articular steroid injections were administered today into bilateral knees for underlying OA. In the future, may consider a trial of rituximab or abatacept with least risk of injury to liver compared to other DMARDs    Orders:    hydroxychloroquine (PLAQUENIL) 200 mg tablet; Take 1 tablet (200 mg total) by mouth 2 (two) times a day with meals

## 2025-01-06 NOTE — PROGRESS NOTES
"Large joint arthrocentesis: bilateral knee  Universal Protocol:  procedure performed by consultantConsent: Verbal consent obtained.  Risks and benefits: risks, benefits and alternatives were discussed  Consent given by: patient  Time out: Immediately prior to procedure a \"time out\" was called to verify the correct patient, procedure, equipment, support staff and site/side marked as required.  Patient understanding: patient states understanding of the procedure being performed  Patient consent: the patient's understanding of the procedure matches consent given  Procedure consent: procedure consent matches procedure scheduled  Relevant documents: relevant documents present and verified  Test results: test results available and properly labeled  Site marked: the operative site was marked  Radiology Images displayed and confirmed. If images not available, report reviewed: imaging studies available  Patient identity confirmed: verbally with patient  Supporting Documentation  Indications: pain   Procedure Details  Location: knee - bilateral knee  Preparation: Patient was prepped and draped in the usual sterile fashion  Needle size: 18 G  Ultrasound guidance: no  Approach: lateral    Medications (Right): 1 mL lidocaine 1 %; 40 mg triamcinolone acetonide 40 mg/mLMedications (Left): 1 mL lidocaine 1 %; 40 mg triamcinolone acetonide 40 mg/mL   Patient tolerance: patient tolerated the procedure well with no immediate complications  Dressing:  Sterile dressing applied          "

## 2025-02-16 DIAGNOSIS — M05.79 RHEUMATOID ARTHRITIS INVOLVING MULTIPLE SITES WITH POSITIVE RHEUMATOID FACTOR (HCC): ICD-10-CM

## 2025-02-17 RX ORDER — HYDROXYCHLOROQUINE SULFATE 200 MG/1
TABLET, FILM COATED ORAL
Qty: 200 TABLET | Refills: 3 | Status: SHIPPED | OUTPATIENT
Start: 2025-02-17 | End: 2025-08-16

## 2025-02-25 NOTE — PROGRESS NOTES
Assessment & Plan  Class 3 Obesity     Current weight: 223    Patient has lost about over 10 pounds on her own with healthy portions.  Meals are cooked by her .  She would like to avoid any medications at this time, recommended to follow-up with a dietitian for meal planning due to meal inconsistency    Recommend to avoid skipping any meals. Meal frequency (3 meals 2-3 snacks in between) can potentially improve metabolism and allow for better portion control by decreasing Ghrelin (hunger hormone)     Mindful Eating and Drinking is necessary to promote healthy behaviors that can lead to decreased adipose tissue which can be curative and preventative for comorbidities such as hypertension, diabetes, anxiety, depression, osteoarthritis, dyslipidemia, asthma, liver disease, cardiovascular disease, stroke, sleep apnea and cancers.    Patient's co-morbilities include uncontrolled type 2 diabetes, hypertension, hyperlipidemia     Macronutrients: (Nutrients that the body needs for energy and support vital functions)    Protein is necessary to promote satiety, metabolism, and muscle growth/repair. Increased energy expenditure is used for the processes of breaking down and rebuilding proteins within the body   Carbohydrates are essential as it is the body's main fuel source for daily activities.  Recommend that carbohydrates be consumed mostly during the times you are more active rather than high amounts before bedtime  Fats: Essential vitamins like A, D, and E, protect your organs, support cell growth and contribute to maintaining healthy cholesterol levels      Fluid intake which is at least half your body weight in ounces is necessary to help control cravings (decreasing confusion for appetite vs water deprivation) as the human body is made up of 50-70% of Fluids. If there is a diversion for water alone, would recommend flavored water (example-splash of lemonade or ice tea) to help promote compliance. Fluids  include Teas, water, flavored water, seltzer water, coffee, shakes    Metabolism:    Metabolism can also be promoted by meal frequency, protein intake and increased muscle mass     Daily Calorie Needs: are individualized and will need to take into account any fluid losses, increased activity levels which may need to be adjusted daily. Recommended to not skip any meals even if there is a lack of appetite as it can be suppressed by caffeine or any prior heavy meal due to Leptin (satiety hormone).  Calorie and fluid intake will need to be increased if there is any increased activity during the day compared to previous days.  With proper fluid and macronutrient intake throughout the day, portion control and decreased cravings will improve     Weight check: BMI and weight serve as only guidelines as it is not factor in muscle mass, fat, water. Weights can fluctuate depending on fluid shifts vs what foods are consumed prior to checking your weight    Return for dietitian .      Recent notes, labs and records were reviewed. Total time with chart review and with the patient: 45 min          ______________________________________________________________________        Subjective:     Chief Complaint   Patient presents with    Consult     Mwm Consult: waist: 46in sb: 5/8       HPI: 64-year-old female past medical history hyperlipidemia, hypertension, type 2 diabetes uncontrolled last A1c 7.9 12/3/2024, rheumatoid arthritis, cirrhosis  presents to the weight management clinic for consultation due to difficulty with weight management. Patient has tried to lose weight for the past several years     Wt Loss Attempts:  Previous Medications: GLP agonist in the past but had to switch insurances  Self Created Diets: Healthy choices     Dietary Regimen:  B- Oatmeal,  egg guacomole  L- no lunch   D  - Potatos, pasta, bread, salad, carrots             Fluids:Coffee- 1 cup             Tea             Water 8 cups of water               "Soda       Review Of Systems:  General: No pallor, no weakness   Pulmonary: Negative for shortness of breath  Chest: negative for chest pain  Gastrointestinal:  Negative for abdominal pain, diarrhea   Psychiatric/Behavioral:  Negative for behavioral problems, confusion, dysphoric mood and hallucinations.    All other systems reviewed and are negative.     Objective:  /72   Pulse 85   Temp 97.6 °F (36.4 °C)   Resp 16   Ht 4' 11\" (1.499 m)   Wt 101 kg (223 lb 3.2 oz)   BMI 45.08 kg/m²     Wt Readings from Last 30 Encounters:   02/26/25 101 kg (223 lb 3.2 oz)   01/06/25 103 kg (228 lb)   12/03/24 107 kg (235 lb 12.8 oz)   10/31/24 106 kg (234 lb)   02/01/24 108 kg (238 lb)   12/08/23 108 kg (237 lb 3.2 oz)   11/16/23 107 kg (236 lb 8.9 oz)   08/31/23 105 kg (232 lb 6.4 oz)   01/25/22 119 kg (263 lb)   04/16/21 118 kg (260 lb)   04/05/21 120 kg (265 lb)   07/06/20 114 kg (251 lb)   04/30/20 111 kg (245 lb)   03/06/20 111 kg (245 lb)   01/13/20 114 kg (251 lb 9.6 oz)   02/01/19 115 kg (253 lb)   01/29/19 115 kg (253 lb)   01/28/19 115 kg (253 lb)       Physical Exam  Constitutional:       General: No acute distress.  Well-nourished  HENT:      Head: Normocephalic and atraumatic.   Eyes:      Extraocular Movements: Extraocular movements intact.      Conjunctiva/pupils: Conjunctivae normal. Pupils are equal, round  Pulmonary:      Effort: Pulmonary effort is normal. No labored breathing   Neurological:      General: No focal deficit present.  AO x 3     Mental Status: Alert and oriented to person, place, and time. Mental status is at baseline.   Psychiatric:         Mood and Affect: Mood normal.         Behavior: Behavior normal.     Labs and Imaging  Recent labs and imaging have been personally reviewed.      "

## 2025-02-26 ENCOUNTER — OFFICE VISIT (OUTPATIENT)
Dept: BARIATRICS | Facility: CLINIC | Age: 65
End: 2025-02-26
Payer: COMMERCIAL

## 2025-02-26 VITALS
RESPIRATION RATE: 16 BRPM | WEIGHT: 223.2 LBS | BODY MASS INDEX: 45 KG/M2 | TEMPERATURE: 97.6 F | HEART RATE: 85 BPM | HEIGHT: 59 IN | DIASTOLIC BLOOD PRESSURE: 72 MMHG | SYSTOLIC BLOOD PRESSURE: 122 MMHG

## 2025-02-26 DIAGNOSIS — E66.01 CLASS 3 SEVERE OBESITY DUE TO EXCESS CALORIES WITHOUT SERIOUS COMORBIDITY WITH BODY MASS INDEX (BMI) OF 45.0 TO 49.9 IN ADULT (HCC): ICD-10-CM

## 2025-02-26 DIAGNOSIS — E66.813 CLASS 3 SEVERE OBESITY DUE TO EXCESS CALORIES WITHOUT SERIOUS COMORBIDITY WITH BODY MASS INDEX (BMI) OF 45.0 TO 49.9 IN ADULT (HCC): ICD-10-CM

## 2025-02-26 PROCEDURE — 99204 OFFICE O/P NEW MOD 45 MIN: CPT | Performed by: STUDENT IN AN ORGANIZED HEALTH CARE EDUCATION/TRAINING PROGRAM

## 2025-02-27 NOTE — PROGRESS NOTES
Weight Management Medical Nutrition Assessment  Susy presented for a meal planning session. Today's weight is 227.4#. Presents with her  at today's visit. Hx hyperlipidemia, HTN, type 2 DM. A1C 7.9. Susy recently lost 10lb on her own. Notes her weight keeps fluctuating. Her highest weight was 310# about a year ago. She is concerned about her nutrition and eating habits. Reports most of her food choices are processed and less from whole foods. Per dietary recall her  cooks and is her caretaker. She has long stretches in between meals. Wakes early and has breakfast 5 hours after waking. Because of later meal will often skip lunch and have dinner at 7PM. She is interested in meal delivery services for convenience. Discussed options such as Hello Fresh, Green , Hung[employer] Root, Factor, etc. Encouraged her to review food labels on meals. Patient is interested in education and considering Healthy Core program. Will f/u with RD in 4 weeks. Today's session focused on sweet snack alternatives, protein snacks, and timing of meals. Due to limited time/patient's interest we did not develop meal plan- protein recommendations and food journals were provided. Will create at f/u visit.    Patient seen by Medical Provider in past 6 months:  yes  Requested to schedule appointment with Medical Provider: No      Anthropometric Measurements  Start Weight (#) & Date:  223# 2/26/25  Current Weight (#): 227.4#  TBW % Change from start weight: -  Ideal Body Weight (#): 97.5#  Goal Weight (#): lose 20#  Highest 320#  Lowest: unable to recall, over 40 years ago    Weight Loss History  Previous weight loss attempts: Self Created Diets (Portion Control, Healthy Food Choices, etc.)  GLP-1    Food and Nutrition Related History  Wake up: 5AM   Bed Time: 11-12PM  Wakes up often during the night due to pain.    Food Recall  Breakfast: 10-11AM 1 cup tea + jones + eggs with guacamole + cheese OR oatmeal with walnuts, dates, coconut,  fruit, and raisins OR cottage cheese with raisins, dates, and walnuts    Snack: skip  Lunch: skip  Snack: popcorn OR fruit (honeydew mix, orange, grapes, or apple)  Dinner: 6:30-7PM pork chop + salad (cheese, fruit, lettuce, raspberry vinaigrette or blue cheese dressing) + sauteed onions (protein is 6oz)  Snack: (after dinner) oreos OR oatmeal raisin    Beverages: 2 cups coffee with splenda or stevia and milk, 6-8 cups water, tea  Volume of beverage intake: adequate     Weekends: Same  Cravings: sweets  Trouble area of day: after dinner    Frequency of Eating out: 1x per week  Food restrictions: n/a  Cooking:   Food Shopping:     Physical Activity Intake  Activity: none currently  Frequency: none currently  Physical limitations/barriers to exercise: RA, fibromyalgia     Estimated Needs  Energy  SECA: BMR: n/a      X 1.3 -1000 =  [address] [facility] Energy Needs: BMR : 1472   1-2# loss weekly sedentary:  767-1267             1-2# loss weekly lightly active: 1646-2553  Maintenance calories for sedentary activity level: 1767  Protein: 53-66      (1.2-1.5g/kg IBW)  Fluid Requirement Calculator   Total Fluid:  106oz     ([place]-Segar Method)  Free Fluid: 85oz (Marina-Segar Method - 20%)    Nutrition Diagnosis  Yes;    Overweight/obesity  related to Food and nutrition related knowledge deficit as evidenced by  BMI more than normative standard for age and sex (obesity-grade III 40+)       Nutrition Intervention    Nutrition Prescription  Calories: 3612-5402  Protein: 65-75  Fluid: 85    Meal Plan (Delfin/Pro/Carb)  Breakfast: 20  Snack:   Lunch: 20  Snack:  Dinner: 20  Snack: 5-15    Nutrition Education:    Calorie controlled menu  Lean protein food choices  Healthy snack options  Food journaling tips      Nutrition Counseling:  Strategies: meal planning, portion sizes, healthy snack choices, hydration, fiber intake, protein intake, exercise, food journal      Monitoring and Evaluation:  Evaluation  criteria:  Energy Intake  Meet protein needs  Maintain adequate hydration  Monitor weekly weight  Meal planning/preparation  Food journal   Decreased portions at mealtimes and snacks  Physical activity     Barriers to learning:none  Readiness to change: Preparation:  (Getting ready to change)   Comprehension: good  Expected Compliance: fair

## 2025-03-04 DIAGNOSIS — F33.9 RECURRENT MAJOR DEPRESSIVE DISORDER, REMISSION STATUS UNSPECIFIED (HCC): ICD-10-CM

## 2025-03-04 DIAGNOSIS — M79.7 FIBROMYALGIA: ICD-10-CM

## 2025-03-05 ENCOUNTER — CLINICAL SUPPORT (OUTPATIENT)
Dept: BARIATRICS | Facility: CLINIC | Age: 65
End: 2025-03-05

## 2025-03-05 VITALS — BODY MASS INDEX: 45.84 KG/M2 | WEIGHT: 227.4 LBS | HEIGHT: 59 IN

## 2025-03-05 DIAGNOSIS — R63.5 ABNORMAL WEIGHT GAIN: Primary | ICD-10-CM

## 2025-03-05 PROCEDURE — RECHECK

## 2025-03-05 PROCEDURE — WMDI30

## 2025-03-05 RX ORDER — DULOXETIN HYDROCHLORIDE 30 MG/1
30 CAPSULE, DELAYED RELEASE ORAL DAILY
Qty: 30 CAPSULE | Refills: 2 | Status: SHIPPED | OUTPATIENT
Start: 2025-03-05

## 2025-03-25 DIAGNOSIS — E78.01 FAMILIAL HYPERCHOLESTEROLEMIA: ICD-10-CM

## 2025-03-26 RX ORDER — ATORVASTATIN CALCIUM 20 MG/1
20 TABLET, FILM COATED ORAL DAILY
Qty: 90 TABLET | Refills: 0 | Status: SHIPPED | OUTPATIENT
Start: 2025-03-26

## 2025-03-28 LAB — COLOGUARD RESULT REPORTABLE: NEGATIVE

## 2025-04-01 ENCOUNTER — RESULTS FOLLOW-UP (OUTPATIENT)
Dept: GASTROENTEROLOGY | Facility: AMBULARY SURGERY CENTER | Age: 65
End: 2025-04-01

## 2025-04-09 DIAGNOSIS — I85.00 ESOPHAGEAL VARICES DETERMINED BY ENDOSCOPY (HCC): ICD-10-CM

## 2025-04-09 NOTE — TELEPHONE ENCOUNTER
Reason for call:   [x] Refill   [] Prior Auth  [x] Other: Previously rxed by old pcp, asking dr mixon to take over     Office:   [x] PCP/Provider -   [] Specialty/Provider -     carvedilol (COREG) 6.25 mg tablet 6.25 mg, Oral, 2 times daily with meals       Quantity: 90    Pharmacy: [facility]    [facility]   Does the patient have enough for 3 days?   [] Yes   [x] No - Send as HP to POD    [facility] Pharm[facility]   Does the patient have enough for 10 days?   [] Yes   [] No - Send as HP to POD

## 2025-04-10 RX ORDER — CARVEDILOL 6.25 MG/1
6.25 TABLET ORAL 2 TIMES DAILY WITH MEALS
Qty: 14 TABLET | Refills: 0 | Status: SHIPPED | OUTPATIENT
Start: 2025-04-10 | End: 2025-04-16 | Stop reason: SDUPTHER

## 2025-04-16 ENCOUNTER — OFFICE VISIT (OUTPATIENT)
Age: 65
End: 2025-04-16

## 2025-04-16 VITALS
HEART RATE: 72 BPM | SYSTOLIC BLOOD PRESSURE: 110 MMHG | DIASTOLIC BLOOD PRESSURE: 62 MMHG | WEIGHT: 228.8 LBS | HEIGHT: 59 IN | BODY MASS INDEX: 46.12 KG/M2

## 2025-04-16 DIAGNOSIS — Z86.19 HISTORY OF HELICOBACTER PYLORI INFECTION: ICD-10-CM

## 2025-04-16 DIAGNOSIS — K74.60 CIRRHOSIS OF LIVER WITHOUT ASCITES, UNSPECIFIED HEPATIC CIRRHOSIS TYPE (HCC): Primary | ICD-10-CM

## 2025-04-16 DIAGNOSIS — I85.00 ESOPHAGEAL VARICES DETERMINED BY ENDOSCOPY (HCC): ICD-10-CM

## 2025-04-16 DIAGNOSIS — K76.9 LIVER LESION: ICD-10-CM

## 2025-04-16 DIAGNOSIS — K21.9 GASTROESOPHAGEAL REFLUX DISEASE, UNSPECIFIED WHETHER ESOPHAGITIS PRESENT: ICD-10-CM

## 2025-04-16 DIAGNOSIS — K75.81 METABOLIC DYSFUNCTION-ASSOCIATED STEATOHEPATITIS (MASH): ICD-10-CM

## 2025-04-16 RX ORDER — OMEPRAZOLE 40 MG/1
40 CAPSULE, DELAYED RELEASE ORAL DAILY
Qty: 90 CAPSULE | Refills: 3 | Status: SHIPPED | OUTPATIENT
Start: 2025-04-16

## 2025-04-16 RX ORDER — CARVEDILOL 6.25 MG/1
6.25 TABLET ORAL 2 TIMES DAILY WITH MEALS
Qty: 180 TABLET | Refills: 3 | Status: SHIPPED | OUTPATIENT
Start: 2025-04-16

## 2025-04-16 NOTE — PROGRESS NOTES
Name: Susy Crain      : 1960      MRN: [id number]  Encounter Provider: Corin Gayle PA-C  Encounter Date: 2025   Encounter department: [facility] [address]  :  Assessment & Plan  Cirrhosis of liver without ascites, unspecified hepatic cirrhosis type (HCC)    Summary: Patient is a 64 y.o. 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 Current MELD-3.0 (11).    Patient with a longstanding history of abnormal liver function test noted to have hepatomegaly and slight heterogeneous liver parenchyma on ultrasound. She developed chronic thrombocytopenia and subsequently had a US elastography demonstrating F4 disease. Etiology was felt to be MASH given metabolic risk factors, prior methotrexate use and negative serologic workup.     Since her diagnosis, she's had an EGD showing large nonbleeding esophageal varices for which she was started on carvedilol. She also had a recent MRI/MRCP (10/28/2024) with 3 small stable LR 3 observations. AFP is normal. Her liver disease is otherwise compensated and overall stable since her last appointment.  She has questions regarding which analgesics are safe for her to take to help with her RA and advised that she is okay to take tramadol and Tylenol up to 2g daily but recommend avoiding NSAIDs.    She is overdue for hepatoma screening and we will plan for a triphasic MRI abdomen with MRCP now. She is also due for updated MELD labs and AFP level. Additional management as below.    Ascites   - None     Esophageal Varices   - EGD (2023) with multiple large esophageal varices and PHG.   - Continue carvedilol 6.25 mg twice daily for primary ppx.   - No further EGDs necessary for variceal screening while on an NSBB barring a decompensating event.    Hepatic Encephalopathy   - No history or evidence of HE on exam.   - Patient aware of signs/sxs's of HE and advised to promptly inform provider if  experiencing such.    HCC Screening   - MRI/MRCP (10/28/2024) with a stable  0.9 x 1.2 cm segment 6, 1.0 x 0.9 segment 7 and 0.6 cm segment 7 LR 3 observation. AFP normal.   - Plan for triphasic MRI abdomen with MRCP now.   - AFP level to be drawn with his next set f routine labs.   - Continue imaging with an AFP level q6 months pending results.      Nutritional Status  - No sarcopenia noted on exam.   - Encouraged high-protein diet in addition to a high-protein snack qHS to prevent prolonged fasting.     Transplant Candidacy   - Defer given compensated disease and low MELD score.     CRC Screening  - Cologuard (3/2025) normal.   - Repeat q3 years.     Orders:    CBC and differential; Future    Comprehensive metabolic panel; Future    Protime-INR; Future    AFP tumor marker; Future    MRI abdomen w wo contrast and mrcp; Future    Metabolic dysfunction-associated steatohepatitis (MASH)  Refer to A/P above.       Esophageal varices determined by endoscopy (HCC)  Refer to A/P above.  Orders:    carvedilol (COREG) 6.25 mg tablet; Take 1 tablet (6.25 mg total) by mouth 2 (two) times a day with meals    Liver lesion  Refer to A/P above.  Orders:    AFP tumor marker; Future    MRI abdomen w wo contrast and mrcp; Future    Gastroesophageal reflux disease, unspecified whether esophagitis present  Patient reports daily symptoms including a sour taste in the back of her throat and cough that wakes her from sleep, new within the last few months, but denies overt reflux. No new medications or lifestyle changes correlating with sxs onset. No additional upper/lower GI alarm features. She has not tried any OTC or prescription medications to alleviate symptoms.    Most suspicious that symptoms are related to atypical GERD.  Recommended trial of omeprazole 40 mg once daily.  Discussed optimization of PPI and dietary/lifestyle modifications to prevent reflux.  If with persistent symptoms despite PPI use then would recommend an EGD  with gastric biopsies for further evaluation.    Orders:    omeprazole (PriLOSEC) 40 MG capsule; Take 1 capsule (40 mg total) by mouth daily    History of Helicobacter pylori infection  After conclusion of her visit it was noted that she had a remote EGD with gastric biopsies positive for H. pylori. It appears that this was treated but no confirmation of eradication. Ordered an H. pylori stool antigen and instructed patient to complete this prior to starting a PPI.    Orders:    H. pylori antigen, stool; Future      Follow-up in 6 months or sooner if necessary.      History of Present Illness   HPI    Susy Crain is a 64 y.o. female with PMH significant for obesity, DM2, RA, COPD and GERD who presents today for a follow-up regarding [facility] cirrhosis.    Interval history   - Last seen 10/31/2024.  - MRI/MRCP (10/28/2024) with a stable  0.9 x 1.2 cm segment 6, 1.0 x 0.9 segment 7 and 0.6 cm segment 7 LR 3 observation.  - Updated MELD labs were not drawn.  - Cologuard (3/20/2025) was negative.  - She also reports having a sour taste in the back of her throat and coughing that wakes her from sleep. No nausea/vomiting, dysphagia/odynophagia, epigastric abdominal pain, changes in appetite, overt GI bleeding or weight loss.    MELD 3.0: 11 at 10/27/2024 11:18 AM  MELD-Na: 9 at 10/27/2024 11:18 AM  Calculated from:  Serum Creatinine: 0.7 mg/dL (Using min of 1 mg/dL) at 10/27/2024 11:18 AM  Serum Sodium: 136 mmol/L at 10/27/2024 11:18 AM  Total Bilirubin: 1.22 mg/dL at 10/27/2024 11:18 AM  Serum Albumin: 3.8 g/dL (Using max of 3.5 g/dL) at 10/27/2024 11:18 AM  INR(ratio): 1.19 at 10/27/2024 11:18 AM  Age at listing (hypothetical): 64 years  Sex: Female at 10/27/2024 11:18 AM    Extended liver history   Patient is familiar to [facility] previously seen by Dr. Garner in 2020 for abnormal liver function tests noted to have hepatomegaly and slightly heterogeneous liver parenchyma on ultrasound. U/S elastography was notable for F3  fibrosis. She had a serologic evaluation negative for autoimmune hepatitis, A1AT deficiency, Denver's disease and hemochromatosis. She was noted to have a positive hepatitis B core Ab IgM but with a negative core total antibody, surface antigen, surface antibody and HBV DNA. Also HCV negative.     She has had persistently abnormal liver function tests, of mixed type with most recent hepatic function panel noting AST 64, ALT 77, alk phos 196, T. bili 1.4, and thrombocytopenia prompting an abdominal ultrasound notable for cirrhotic morphology without evidence of portal hypertension. US elastography was notable for F4 fibrosis.     Denies a family history of liver disease or liver cancer. She does have autoimmune conditions including rheumatoid arthritis for which she was taking methotrexate x2 years but stopped 3 months prior. Denies EtOH use.     Denies signs or symptoms of ascites, bleeding esophageal varices or hepatic encephalopathy.      History obtained from: patient    Review of Systems   All other systems reviewed and are negative.    Pertinent Medical History     Medical History Reviewed by provider this encounter:  Tobacco  Allergies  Meds  Problems  Med Hx  Surg Hx  Fam Hx     .  Past Medical History   Past Medical History:   Diagnosis Date    Anxiety     Arthritis     Depression     Diabetes (HCC)     Diabetes mellitus (HCC)     Fibromyalgia     GERD (gastroesophageal reflux disease)     Headache(784.0)     Hypothyroid     Obesity     Pneumonia     Shortness of breath     Visual impairment      Past Surgical History:   Procedure Laterality Date    KNEE CARTILAGE SURGERY Right     TRACHEOSTOMY       Family History   Problem Relation Age of Onset    Pneumonia Father     Heart attack Father 67    Asthma Brother     Diabetes Maternal Grandfather     Cancer Paternal Grandmother     Thyroid cancer Neg Hx     Pancreatitis Neg Hx       reports that she quit smoking about 28 years ago. Her smoking use  included cigarettes. She started smoking about 38 years ago. She has a 2.5 pack-year smoking history. She has never used smokeless tobacco. She reports that she does not currently use alcohol after a past usage of about 2.0 standard drinks of alcohol per week. She reports that she does not use drugs.  Current Outpatient Medications   Medication Instructions    albuterol (PROVENTIL HFA,VENTOLIN HFA) 90 mcg/act inhaler 1 puff, Inhalation, Every 6 hours PRN    atorvastatin (LIPITOR) 20 mg, Oral, Daily    carvedilol (COREG) 6.25 mg, Oral, 2 times daily with meals    DULoxetine (CYMBALTA) 30 mg, Oral, Daily    HYDROcodone-acetaminophen (Norco) 5-325 mg per tablet 1 tablet, Oral, Every 6 hours PRN    hydroxychloroquine (PLAQUENIL) 200 mg tablet TAKE 1 TABLET BY MOUTH 2 TIMES A DAY WITH MEALS    metFORMIN (GLUCOPHAGE) 500 mg, Oral, 2 times daily with meals    omeprazole (PRILOSEC) 40 mg, Oral, Daily    ondansetron (ZOFRAN-ODT) 4 mg, Oral, Every 12 hours PRN    Vitamin D (Cholecalciferol) 2,000 Units, Oral, Daily     Allergies   Allergen Reactions    Penicillins     Wellbutrin [Bupropion]       Current Outpatient Medications on File Prior to Visit   Medication Sig Dispense Refill    albuterol (PROVENTIL HFA,VENTOLIN HFA) 90 mcg/act inhaler Inhale 1 puff every 6 (six) hours as needed (cough) 8 g 0    atorvastatin (LIPITOR) 20 mg tablet Take 1 tablet (20 mg total) by mouth daily 90 tablet 0    DULoxetine (CYMBALTA) 30 mg delayed release capsule Take 1 capsule (30 mg total) by mouth daily 30 capsule 2    HYDROcodone-acetaminophen (Norco) 5-325 mg per tablet Take 1 tablet by mouth every 6 (six) hours as needed for pain Max Daily Amount: 4 tablets 30 tablet 0    hydroxychloroquine (PLAQUENIL) 200 mg tablet TAKE 1 TABLET BY MOUTH 2 TIMES A DAY WITH MEALS 200 tablet 3    ondansetron (ZOFRAN-ODT) 4 mg disintegrating tablet Take 1 tablet (4 mg total) by mouth every 12 (twelve) hours as needed for nausea or vomiting 20 tablet 0     "Vitamin D, Cholecalciferol, 25 MCG (1000 UT) TABS Take 2 tablets by mouth once daily 180 tablet 0    [DISCONTINUED] carvedilol (COREG) 6.25 mg tablet Take 1 tablet (6.25 mg total) by mouth 2 (two) times a day with meals 14 tablet 0    metFORMIN (GLUCOPHAGE) 500 mg tablet take 1 tablet by mouth twice a day with meals (Patient not taking: Reported on 2025) 60 tablet 5     No current facility-administered medications on file prior to visit.      Social History     Tobacco Use    Smoking status: Former     Current packs/day: 0.00     Average packs/day: 0.3 packs/day for 10.0 years (2.5 ttl pk-yrs)     Types: Cigarettes     Start date: 1987     Quit date: 1997     Years since quittin.2    Smokeless tobacco: Never   Vaping Use    Vaping status: Never Used   Substance and Sexual Activity    Alcohol use: Not Currently     Alcohol/week: 2.0 standard drinks of alcohol    Drug use: No    Sexual activity: Not on file        Objective   /62 (BP Location: Right arm, Patient Position: Sitting, Cuff Size: Extra-Large)   Pulse 72   Ht 4' 11\" (1.499 m)   Wt 104 kg (228 lb 12.8 oz)   BMI 46.21 kg/m²      Physical Exam  Vitals and nursing note reviewed.   Constitutional:       General: She is not in acute distress.     Appearance: Normal appearance. She is well-developed. She is not ill-appearing or toxic-appearing.   HENT:      Head: Normocephalic and atraumatic.   Eyes:      General: No scleral icterus.     Conjunctiva/sclera: Conjunctivae normal.   Pulmonary:      Effort: Pulmonary effort is normal.   Abdominal:      General: Bowel sounds are normal. There is no distension.      Palpations: Abdomen is soft. There is no mass.      Tenderness: There is no abdominal tenderness.   Musculoskeletal:      Right lower leg: No edema.      Left lower leg: No edema.   Skin:     General: Skin is warm and dry.      Coloration: Skin is not jaundiced.      Findings: No bruising or rash.   Neurological:      Mental " Status: She is alert and oriented to person, place, and time. Mental status is at baseline.   Psychiatric:         Mood and Affect: Mood normal.         Behavior: Behavior normal.

## 2025-04-16 NOTE — PATIENT INSTRUCTIONS
"Pt states she will schedule MRI Abdomen w wo Contrast and MRCP, phone number for \"Central Scheduling\" given to Pt @ [facility].  "

## 2025-04-16 NOTE — ASSESSMENT & PLAN NOTE
Refer to A/P above.  Orders:    carvedilol (COREG) 6.25 mg tablet; Take 1 tablet (6.25 mg total) by mouth 2 (two) times a day with meals

## 2025-04-17 ENCOUNTER — APPOINTMENT (OUTPATIENT)
Dept: LAB | Facility: CLINIC | Age: 65
End: 2025-04-17

## 2025-05-10 ENCOUNTER — OFFICE VISIT (OUTPATIENT)
Dept: URGENT CARE | Facility: MEDICAL CENTER | Age: 65
End: 2025-05-10
Payer: COMMERCIAL

## 2025-05-10 VITALS
DIASTOLIC BLOOD PRESSURE: 76 MMHG | HEART RATE: 76 BPM | RESPIRATION RATE: 18 BRPM | OXYGEN SATURATION: 99 % | SYSTOLIC BLOOD PRESSURE: 126 MMHG | TEMPERATURE: 98 F

## 2025-05-10 DIAGNOSIS — H00.014 HORDEOLUM EXTERNUM OF LEFT UPPER EYELID: Primary | ICD-10-CM

## 2025-05-10 PROCEDURE — 99213 OFFICE O/P EST LOW 20 MIN: CPT

## 2025-05-10 RX ORDER — ERYTHROMYCIN 5 MG/G
0.5 OINTMENT OPHTHALMIC
Qty: 7 G | Refills: 0 | Status: SHIPPED | OUTPATIENT
Start: 2025-05-10 | End: 2025-05-17

## 2025-05-10 NOTE — PATIENT INSTRUCTIONS
"Your symptoms are likely due to a stye.   Prescribed a course of erythromycin eye ointment, use as directed.   Warm compresses to your eye four times daily for 15 minute periods.   Over the counter pain medication as directed on packaging as needed for pain (ex: Tylenol, Motrin).    Follow up with PCP in 3-5 days.  Proceed to  ER if symptoms worsen.    If tests are performed, our office will contact you with results only if changes need to made to the care plan discussed with you at the visit. You [facility]    Patient Education     Stye   The Basics   Written by the doctors and editors at [facility]   What is a stye? -- A stye is a red and painful lump on the eyelid. It happens when a small gland on the edge of the eyelid gets infected or inflamed. Styes can form on the upper or lower eyelids. Most styes get better on their own after a few days to a week. The medical term for stye is \"hordeolum.\"  People sometimes get a stye confused with a different eye problem called a \"chalazion.\" A chalazion also causes a lump on the eyelid. But a stye is caused by an infection and is painful. A chalazion is not tender or painful, but it often lasts longer than a stye does.  What are the symptoms of a stye? -- People who have a stye have a painful lump on the edge of their eyelid (picture 1). The lump might look red, swollen, or similar to a pimple.  A stye usually develops over a few days. Styes can cause other symptoms, too, such as tearing and eyelid pain and swelling.  Is there a test for a stye? -- No. But your doctor or nurse should be able to tell if you have a stye by talking with you and doing an exam.  Is there anything I can do on my own? -- Yes:   Put a warm, wet compress on the stye. Wet a clean washcloth with warm water, and put it over your stye. When the washcloth cools, reheat it with warm water and put it back over the stye. Repeat these steps for about 15 minutes, and " try to do this 4 times a day.   It might help to gently massage your eyelid.   Do not squeeze or try to pop your stye. This can make it worse.   Do not wear eye makeup or contact lenses until your stye is gone.  What treatments might my doctor use? -- If your stye doesn't get better or if it leads to other problems, your doctor might:   Prescribe a cream or ointment that goes in the eye and on the eyelid   Prescribe antibiotic medicines   Do a procedure to drain the stye  Can styes be prevented? -- Yes. To lower your chances of getting a stye, you can:   Wash your hands often - It's especially important to wash your hands before you touch your eyes. Also, if you wear contact lenses, keep them clean and wash your hands before you put them in.   Be careful with your eye makeup - Wearing eye makeup can sometimes cause a stye. Remove your eye makeup each night, and throw away old makeup. Do not share eye makeup with other people.  When should I call the doctor? -- Call your doctor or nurse for advice if:   Your stye doesn't go away after using warm compresses for 1 to 2 weeks.   Your stye gets very big, bleeds, or affects your vision.   Your whole eye is red, or your whole eyelid is red or swollen.   The redness or swelling spreads to your cheek or other parts of your face.  All topics are updated as new evidence becomes available and our peer review process is complete.  This topic retrieved from [employer] on: Feb 26, 2024.  Topic 79121 Version 17.0  Release: 32.2.4 - C32.56  © 2024 UpToDate, Inc. and/or its affiliates. All rights reserved.  picture 1: Stye     This person has a stye on the lower eyelid.  Graphic 68459 Version 2.0  Consumer Information Use and Disclaimer   Disclaimer: This generalized information is a limited summary of diagnosis, treatment, and/or medication information. It is not meant to be comprehensive and should be used as a tool to help the user understand and/or assess potential diagnostic and  treatment options. It does NOT include all information about conditions, treatments, medications, side effects, or risks that may apply to a specific patient. It is not intended to be medical advice or a substitute for the medical advice, diagnosis, or treatment of a health care provider based on the health care provider's examination and assessment of a patient's specific and unique circumstances. Patients must speak with a health care provider for complete information about their health, medical questions, and treatment options, including any risks or benefits regarding use of medications. This information does not endorse any treatments or medications as safe, effective, or approved for treating a specific patient. UpToDate, Inc. and its affiliates disclaim any warranty or liability relating to this information or the use thereof.The use of this information is governed by the Terms of Use, available at https://www.w[employer]tersTrema Groupuwer.com/en/know/clinical-effectiveness-terms. 2024© UpToDate, Inc. and its affiliates and/or licensors. All rights reserved.  Copyright   © 2024 UpToDate, Inc. and/or its affiliates. All rights reserved.

## 2025-05-10 NOTE — PROGRESS NOTES
[facility]        NAME: Susy Crain is a 64 y.o. female  : 1960    MRN: [id number]  DATE: May 10, 2025  TIME: 8:33 PM    Assessment and Plan   Hordeolum externum of left upper eyelid [H00.014]  1. Hordeolum externum of left upper eyelid  erythromycin (ILOTYCIN) ophthalmic ointment        Presentation consistent with stye of left upper eyelid.  Prescribed course of erythromycin ointment.  Advised symptomatic treatment.    Patient Instructions     Your symptoms are likely due to a stye.   Prescribed a course of erythromycin eye ointment, use as directed.   Warm compresses to your eye four times daily for 15 minute periods.   Over the counter pain medication as directed on packaging as needed for pain (ex: Tylenol, Motrin).    Follow up with PCP in 3-5 days.  Proceed to  ER if symptoms worsen.    If tests are performed, our office will contact you with results only if changes need to made to the care plan discussed with you at the visit. You can [facility] on [facility]    Chief Complaint     Chief Complaint   Patient presents with    Eye Problem     Patient c/o left eye discharge, with irration x 3-4 days          History of Present Illness       Patient presents with  for evaluation of a left eye problem.  Symptoms started 4 days ago.  States she had what looked like a stye to her left upper eyelid, which popped and drained purulent discharge.  States she has been waking up every morning with her left eye pasted shut.  Notes history of styes in the past.    Eye Problem   The left eye is affected. This is a new problem. The current episode started in the past 7 days. The problem occurs constantly. The problem has been gradually improving. There was no injury mechanism. The pain is at a severity of 5/10. There is No known exposure to pink eye. She Wears contacts (has not worn worn contacts for over a month now). Associated symptoms include blurred vision, an eye  discharge, eye redness, itching and photophobia. Pertinent negatives include no double vision or fever. Treatments tried: hot tea bag, tannic acid. The treatment provided mild relief.       Review of Systems   Review of Systems   Constitutional:  Negative for chills and fever.   Eyes:  Positive for blurred vision, photophobia, pain, discharge, redness and itching. Negative for double vision.         Current Medications       Current Outpatient Medications:     erythromycin (ILOTYCIN) ophthalmic ointment, Administer 0.5 inches into the left eye daily at bedtime for 7 days, Disp: 7 g, Rfl: 0    albuterol (PROVENTIL HFA,VENTOLIN HFA) 90 mcg/act inhaler, Inhale 1 puff every 6 (six) hours as needed (cough), Disp: 8 g, Rfl: 0    atorvastatin (LIPITOR) 20 mg tablet, Take 1 tablet (20 mg total) by mouth daily, Disp: 90 tablet, Rfl: 0    carvedilol (COREG) 6.25 mg tablet, Take 1 tablet (6.25 mg total) by mouth 2 (two) times a day with meals, Disp: 180 tablet, Rfl: 3    DULoxetine (CYMBALTA) 30 mg delayed release capsule, Take 1 capsule (30 mg total) by mouth daily, Disp: 30 capsule, Rfl: 2    HYDROcodone-acetaminophen (Norco) 5-325 mg per tablet, Take 1 tablet by mouth every 6 (six) hours as needed for pain Max Daily Amount: 4 tablets, Disp: 30 tablet, Rfl: 0    hydroxychloroquine (PLAQUENIL) 200 mg tablet, TAKE 1 TABLET BY MOUTH 2 TIMES A DAY WITH MEALS, Disp: 200 tablet, Rfl: 3    metFORMIN (GLUCOPHAGE) 500 mg tablet, take 1 tablet by mouth twice a day with meals (Patient not taking: Reported on 4/16/2025), Disp: 60 tablet, Rfl: 5    omeprazole (PriLOSEC) 40 MG capsule, Take 1 capsule (40 mg total) by mouth daily, Disp: 90 capsule, Rfl: 3    ondansetron (ZOFRAN-ODT) 4 mg disintegrating tablet, Take 1 tablet (4 mg total) by mouth every 12 (twelve) hours as needed for nausea or vomiting, Disp: 20 tablet, Rfl: 0    Vitamin D, Cholecalciferol, 25 MCG (1000 UT) TABS, Take 2 tablets by mouth once daily, Disp: 180 tablet, Rfl:  0    Current Allergies     Allergies as of 05/10/2025 - Reviewed 05/10/2025   Allergen Reaction Noted    Penicillins  01/28/2019    Wellbutrin [bupropion]  01/28/2019            The following portions of the patient's history were reviewed and updated as appropriate: allergies, current medications, past family history, past medical history, past social history, past surgical history and problem list.     Past Medical History:   Diagnosis Date    Anxiety     Arthritis     Depression     Diabetes (HCC)     Diabetes mellitus (HCC)     Fibromyalgia     GERD (gastroesophageal reflux disease)     Headache(784.0)     Hypothyroid     Obesity     Pneumonia     Shortness of breath     Visual impairment        Past Surgical History:   Procedure Laterality Date    KNEE CARTILAGE SURGERY Right     TRACHEOSTOMY         Family History   Problem Relation Age of Onset    Pneumonia Father     Heart attack Father 67    Asthma Brother     Diabetes Maternal Grandfather     Cancer Paternal Grandmother     Thyroid cancer Neg Hx     Pancreatitis Neg Hx          Medications have been verified.        Objective   /76   Pulse 76   Temp 98 °F (36.7 °C)   Resp 18   SpO2 99%        Physical Exam     Physical Exam  Vitals and nursing note reviewed.   Constitutional:       General: She is not in acute distress.     Appearance: Normal appearance. She is not ill-appearing.   Eyes:      General: Vision grossly intact. Gaze aligned appropriately.         Left eye: Hordeolum (Upper eyelid) present.No foreign body or discharge.      Extraocular Movements: Extraocular movements intact.      Conjunctiva/sclera: Conjunctivae normal.      Left eye: Left conjunctiva is not injected.      Pupils: Pupils are equal, round, and reactive to light.   Cardiovascular:      Rate and Rhythm: Normal rate and regular rhythm.      Pulses: Normal pulses.      Heart sounds: Normal heart sounds. No murmur heard.  Pulmonary:      Effort: Pulmonary effort is normal.  No respiratory distress.      Breath sounds: Normal breath sounds. No stridor. No wheezing, rhonchi or rales.   Neurological:      Mental Status: She is alert.

## 2025-05-13 ENCOUNTER — TELEPHONE (OUTPATIENT)
Age: 65
End: 2025-05-13

## 2025-05-13 DIAGNOSIS — M05.79 RHEUMATOID ARTHRITIS INVOLVING MULTIPLE SITES WITH POSITIVE RHEUMATOID FACTOR (HCC): Primary | ICD-10-CM

## 2025-05-13 NOTE — TELEPHONE ENCOUNTER
Please let patient know that unfortunately I am not able to prescribe tramadol.  As a practice, our [facility] does not prescribe tramadol or other opiate medications.    We can arrange to do steroid injections in her knees again.  We will need to confirm with insurance that she she has approval.  We will call her to set up a time for injections once we have this confirmed.

## 2025-05-13 NOTE — TELEPHONE ENCOUNTER
Patient called the RX Refill Line. Message is being forwarded to the office.     Patient is requesting refill on Tramadol 50 mg Take 1 tablet every 8 HRS PRN, Not on active medication list. Would like to know if able to get steroid injections in both knees  again     Please contact patient at [phone]

## 2025-05-13 NOTE — TELEPHONE ENCOUNTER
Can we please prior auth for lidocaine and methylprednisolone for bilateral knee injections? Dx rheumatoid arthritis. Thank you!

## 2025-05-14 RX ORDER — METHYLPREDNISOLONE 4 MG/1
TABLET ORAL
Qty: 21 EACH | Refills: 0 | Status: SHIPPED | OUTPATIENT
Start: 2025-05-14 | End: 2025-05-22 | Stop reason: SDUPTHER

## 2025-05-14 NOTE — TELEPHONE ENCOUNTER
Called and spoke with patient to schedule,follow up appointment for tomorrow at 10:00am unfortunately that patient is unable to come in for that time but was able to schedule her for 5/27 at 3:30pm

## 2025-05-14 NOTE — TELEPHONE ENCOUNTER
Please let her know that I did send her a steroid taper that she can start now for pain.  We did get approval from insurance for steroid injections. Can you see if she can come in tomorrow for my open 10AM slot?

## 2025-05-21 NOTE — PROGRESS NOTES
Rheumatology Follow-up Visit  Name: Susy Crain      : 1960      MRN: [id number]  Encounter Provider: Raisa Kessler MD  Encounter Date: 2025   Encounter department: [facility] [address]  :  Assessment & Plan  Rheumatoid arthritis involving multiple sites with positive rheumatoid factor (HCC)  64-year-old female who presents for follow-up of seropositive rheumatoid arthritis.  She has been maintained on hydroxychloroquine for the past few years as monotherapy due to diagnosis of cirrhosis.  She has noticed an increase in symptoms since last visit.  Discussed with patient that given her diagnosis of cirrhosis options are quite limited.  Additionally, she will be traveling to [place] for unknown amount of time.  We discussed risk versus benefit of sulfasalazine and patient was agreeable.  I would like to update her labs first and if stable we will begin low-dose sulfasalazine.  Patient in agreement with plan.  Follow-up in 4 months.  Orders:    Comprehensive metabolic panel; Future    CBC and differential; Future    Primary localized osteoarthritis of knee  Bilateral knee injections performed today per patient request.       Long-term use of hydroxychloroquine               History of Present Illness   HPI  Susy Crain is a 64 y.o. female who presents for follow-up.    Interval History:  Patient reports since last visit she has noticed worsening of pain in hands, elbows, shoulders, knees, and ankles.  Reports stiffness when first arising from bed and then also again in the end of the day.  Swelling has been present in ankles and knees.    Permanent History:  Patient presented in 2025 for history of rheumatoid arthritis and secondary Sjogren's syndrome.  Patient reported previous diagnosis made by Dr. Jefferson in .  Labs significant for  and CCP greater than 250.  Previous treatments included Remicade (lost efficacy), methotrexate (hair loss, nausea, diarrhea),  "leflunomide (no response), Orencia (oral and lip ulcerations after 1 dose).  Patient also had a diagnosis of cirrhosis making Robbie inhibitor not ideal.  Current regimen includes hydroxychloroquine.    Review of Systems  Complete ROS conducted as per HPI. In addition, denies:  Fever  Photosensitive rash  Sicca symptoms  Recurrent oral ulcers  Muscle weakness  Uveitis  Dactylitis  Chest pain  SOB  Pleurisy  Gross hematuria  Foamy urine  Raynaud's  Joint issues other than noted above    Objective   /78 (BP Location: Left arm, Patient Position: Sitting, Cuff Size: Adult)   Pulse 79   Temp 97.6 °F (36.4 °C) (Tympanic)   Ht 4' 11\" (1.499 m)   Wt 100 kg (221 lb)   SpO2 98%   BMI 44.64 kg/m²      Physical Exam  Physical Exam  Constitutional: well appearing, no acute distress  HEENT: normocephalic, sclera clear, no visible oral or nasal ulcers  Neck: supple, no palpable cervical adenopathy  CV: regular rate and rhythm, no murmur  Pulm: normal respiratory effort, lungs clear to auscultation b/l  Skin: no rashes, no skin thickening  Extremities: warm and well perfused, no edema  MSK: Tenderness palpation bilateral medial joint line of the knees and right 2nd and 3rd MCPs, no acute synovitis, no joint effusion,    Labs and Imaging  I have personally reviewed pertinent labs and imaging.   "

## 2025-05-22 DIAGNOSIS — M05.79 RHEUMATOID ARTHRITIS INVOLVING MULTIPLE SITES WITH POSITIVE RHEUMATOID FACTOR (HCC): ICD-10-CM

## 2025-05-22 NOTE — TELEPHONE ENCOUNTER
Reason for call:   [x] Refill   [] Prior Auth  [] Other:     Office:   [] PCP/Provider -   [x] Specialty/Provider - Raisa Kessler     Medication: methylPREDNISolone 4 MG tablet therapy pack     Dose/Frequency: Use as directed on package     Quantity: 21    Pharmacy: [facility]   Does the patient have enough for 3 days?   [] Yes   [x] No - Send as HP to POD     Statement Selected

## 2025-05-23 RX ORDER — METHYLPREDNISOLONE 4 MG/1
TABLET ORAL
Qty: 21 EACH | Refills: 0 | Status: SHIPPED | OUTPATIENT
Start: 2025-05-23

## 2025-05-27 ENCOUNTER — OFFICE VISIT (OUTPATIENT)
Age: 65
End: 2025-05-27
Payer: COMMERCIAL

## 2025-05-27 VITALS
OXYGEN SATURATION: 98 % | HEIGHT: 59 IN | HEART RATE: 79 BPM | DIASTOLIC BLOOD PRESSURE: 78 MMHG | SYSTOLIC BLOOD PRESSURE: 136 MMHG | BODY MASS INDEX: 44.55 KG/M2 | TEMPERATURE: 97.6 F | WEIGHT: 221 LBS

## 2025-05-27 DIAGNOSIS — M17.10 PRIMARY LOCALIZED OSTEOARTHRITIS OF KNEE: ICD-10-CM

## 2025-05-27 DIAGNOSIS — Z79.899 LONG-TERM USE OF HYDROXYCHLOROQUINE: ICD-10-CM

## 2025-05-27 DIAGNOSIS — M05.79 RHEUMATOID ARTHRITIS INVOLVING MULTIPLE SITES WITH POSITIVE RHEUMATOID FACTOR (HCC): Primary | ICD-10-CM

## 2025-05-27 PROCEDURE — 99214 OFFICE O/P EST MOD 30 MIN: CPT | Performed by: STUDENT IN AN ORGANIZED HEALTH CARE EDUCATION/TRAINING PROGRAM

## 2025-05-27 PROCEDURE — 20610 DRAIN/INJ JOINT/BURSA W/O US: CPT | Performed by: STUDENT IN AN ORGANIZED HEALTH CARE EDUCATION/TRAINING PROGRAM

## 2025-05-27 RX ORDER — LIDOCAINE HYDROCHLORIDE 10 MG/ML
1 INJECTION, SOLUTION INFILTRATION; PERINEURAL
Status: COMPLETED | OUTPATIENT
Start: 2025-05-27 | End: 2025-05-27

## 2025-05-27 RX ORDER — METHYLPREDNISOLONE ACETATE 80 MG/ML
1 INJECTION, SUSPENSION INTRA-ARTICULAR; INTRALESIONAL; INTRAMUSCULAR; SOFT TISSUE
Status: COMPLETED | OUTPATIENT
Start: 2025-05-27 | End: 2025-05-27

## 2025-05-27 RX ADMIN — METHYLPREDNISOLONE ACETATE 1 ML: 80 INJECTION, SUSPENSION INTRA-ARTICULAR; INTRALESIONAL; INTRAMUSCULAR; SOFT TISSUE at 15:30

## 2025-05-27 RX ADMIN — LIDOCAINE HYDROCHLORIDE 1 ML: 10 INJECTION, SOLUTION INFILTRATION; PERINEURAL at 15:30

## 2025-05-27 NOTE — PATIENT INSTRUCTIONS
Sulfasalazine (Azulfidine)    Sulfasalazine (Azulfidine) is considered a disease-modifying anti-rheumatic drug (DMARD). Sulfasalazine reduces inflammation and is used in the treatment of rheumatoid arthritis (RA), inflammatory bowel disease, and some other autoimmune conditions. You should not take it if you have a sulfa allergy.    How to Take It  Sulfasalazine comes in a 500 mg tablet and should be taken with food and a full glass of water to avoid an upset stomach. The medication is often started at a lower dose when treating RA to prevent side effects, typically 1 to 2 tablets a day. After the first week, the dose may be slowly increased to the usual dosage of 2 or 3 tablets twice a day. An enteric-coated preparation is available that may lessen some of the side effects, particularly stomach upset. This form of sulfasalazine should not be crushed or chewed. It usually takes between 2 to 3 months to notice any improvement in RA symptoms after starting sulfasalazine.    Side Effects  In general sulfasalazine is well-tolerated. The most common side effects are headache, nausea, and abdominal discomfort, which occur early during treatment. These may improve with time and are often avoided by slowly increasing from a low starting dose. Sun sensitivity of the skin can also be a side effect. Those on sulfasalazine should use sunscreen when outdoors and avoid prolonged exposure to sunlight. Some people will develop orange colored urine. This should not cause alarm. It is usually harmless and goes away after medication is stopped. In some cases, sulfasalazine may reduce the number of blood cells, or may cause abnormal liver tests. Your doctor will perform blood tests regularly to check these. Sulfasalazine can cause an allergic reaction that varies from mild (skin rash) to more severe (fevers, abnormal liver tests). Sulfasalazine can lower sperm counts but this effect is reversible when the medication is  discontinued.    Tell Your Rheumatology Provider  Sulfasalazine may interfere with warfarin (Coumadin), cyclosporine or digoxin, so dose adjustments may be needed if these medications are taken together. Sulfasalazine increases the risk for liver injury if given with the drug isoniazid (INH), a drug for tuberculosis and may increase the risk for low blood sugar in patients taking certain medications for diabetes such as glimepiride (Amaryl), glyburide (Diabeta, Micronase, Glynase) and glipizide (Glucotrol). Sulfasalazine is safe for pregnancy and breastfeeding. Tell your doctor if you have ever had any unusual or allergic reaction to other medicines that are chemically related to sulfa drugs.    Updated February 2024 by Daniela Joseph MD, and reviewed by the American College of Rhe[employer] Committee on Communications and Marketing.

## 2025-05-27 NOTE — PROGRESS NOTES
"Large joint arthrocentesis: bilateral knee    Performed by: Raisa Kessler MD  Authorized by: Raisa Kessler MD    Universal Protocol:  procedure performed by consultantConsent: Verbal consent obtained  Risks and benefits: risks, benefits and alternatives were discussed  Consent given by: patient  Time out: Immediately prior to procedure a \"time out\" was called to verify the correct patient, procedure, equipment, support staff and site/side marked as required.  Patient understanding: patient states understanding of the procedure being performed  Patient consent: the patient's understanding of the procedure matches consent given  Procedure consent: procedure consent matches procedure scheduled  Relevant documents: relevant documents present and verified  Test results: test results available and properly labeled  Site marked: the operative site was marked  Radiology Images displayed and confirmed. If images not available, report reviewed: imaging studies available  Patient identity confirmed: verbally with patient  Supporting Documentation  Indications: pain     Is this a Visco injection? NoProcedure Details  Location: knee - bilateral knee  Preparation: Patient was prepped and draped in the usual sterile fashion  Needle size: 25 G  Ultrasound guidance: no  Approach: anterolateral    Medications (Right): 1 mL lidocaine 1 %; 1 mL methylPREDNISolone acetate 80 mg/mLMedications (Left): 1 mL lidocaine 1 %; 1 mL methylPREDNISolone acetate 80 mg/mL   Patient tolerance: patient tolerated the procedure well with no immediate complications  Dressing:  Sterile dressing applied         "

## 2025-05-27 NOTE — ASSESSMENT & PLAN NOTE
64-year-old female who presents for follow-up of seropositive rheumatoid arthritis.  She has been maintained on hydroxychloroquine for the past few years as monotherapy due to diagnosis of cirrhosis.  She has noticed an increase in symptoms since last visit.  Discussed with patient that given her diagnosis of cirrhosis options are quite limited.  Additionally, she will be traveling to [place] for unknown amount of time.  We discussed risk versus benefit of sulfasalazine and patient was agreeable.  I would like to update her labs first and if stable we will begin low-dose sulfasalazine.  Patient in agreement with plan.  Follow-up in 4 months.  Orders:    Comprehensive metabolic panel; Future    CBC and differential; Future

## 2025-06-04 ENCOUNTER — TELEPHONE (OUTPATIENT)
Dept: FAMILY MEDICINE CLINIC | Facility: CLINIC | Age: 65
End: 2025-06-04

## 2025-06-04 ENCOUNTER — APPOINTMENT (OUTPATIENT)
Dept: LAB | Facility: CLINIC | Age: 65
End: 2025-06-04
Payer: COMMERCIAL

## 2025-06-04 DIAGNOSIS — M05.79 RHEUMATOID ARTHRITIS INVOLVING MULTIPLE SITES WITH POSITIVE RHEUMATOID FACTOR (HCC): ICD-10-CM

## 2025-06-04 DIAGNOSIS — M79.7 FIBROMYALGIA: ICD-10-CM

## 2025-06-04 DIAGNOSIS — M05.79 RHEUMATOID ARTHRITIS INVOLVING MULTIPLE SITES WITH POSITIVE RHEUMATOID FACTOR (HCC): Primary | ICD-10-CM

## 2025-06-04 LAB
ALBUMIN SERPL BCG-MCNC: 3.7 G/DL (ref 3.5–5)
ALP SERPL-CCNC: 216 U/L (ref 34–104)
ALT SERPL W P-5'-P-CCNC: 123 U/L (ref 7–52)
ANION GAP SERPL CALCULATED.3IONS-SCNC: 8 MMOL/L (ref 4–13)
AST SERPL W P-5'-P-CCNC: 72 U/L (ref 13–39)
BASOPHILS # BLD AUTO: 0.05 THOUSANDS/ÂΜL (ref 0–0.1)
BASOPHILS NFR BLD AUTO: 1 % (ref 0–1)
BILIRUB SERPL-MCNC: 1.41 MG/DL (ref 0.2–1)
BUN SERPL-MCNC: 13 MG/DL (ref 5–25)
CALCIUM SERPL-MCNC: 8.8 MG/DL (ref 8.4–10.2)
CHLORIDE SERPL-SCNC: 101 MMOL/L (ref 96–108)
CO2 SERPL-SCNC: 27 MMOL/L (ref 21–32)
CREAT SERPL-MCNC: 0.6 MG/DL (ref 0.6–1.3)
EOSINOPHIL # BLD AUTO: 0.13 THOUSAND/ÂΜL (ref 0–0.61)
EOSINOPHIL NFR BLD AUTO: 1 % (ref 0–6)
ERYTHROCYTE [DISTWIDTH] IN BLOOD BY AUTOMATED COUNT: 13.2 % (ref 11.6–15.1)
GFR SERPL CREATININE-BSD FRML MDRD: 97 ML/MIN/1.73SQ M
GLUCOSE P FAST SERPL-MCNC: 320 MG/DL (ref 65–99)
HCT VFR BLD AUTO: 43.1 % (ref 34.8–46.1)
HGB BLD-MCNC: 14.6 G/DL (ref 11.5–15.4)
IMM GRANULOCYTES # BLD AUTO: 0.06 THOUSAND/UL (ref 0–0.2)
IMM GRANULOCYTES NFR BLD AUTO: 1 % (ref 0–2)
LYMPHOCYTES # BLD AUTO: 1.65 THOUSANDS/ÂΜL (ref 0.6–4.47)
LYMPHOCYTES NFR BLD AUTO: 18 % (ref 14–44)
MCH RBC QN AUTO: 30.4 PG (ref 26.8–34.3)
MCHC RBC AUTO-ENTMCNC: 33.9 G/DL (ref 31.4–37.4)
MCV RBC AUTO: 90 FL (ref 82–98)
MONOCYTES # BLD AUTO: 0.53 THOUSAND/ÂΜL (ref 0.17–1.22)
MONOCYTES NFR BLD AUTO: 6 % (ref 4–12)
NEUTROPHILS # BLD AUTO: 6.94 THOUSANDS/ÂΜL (ref 1.85–7.62)
NEUTS SEG NFR BLD AUTO: 73 % (ref 43–75)
NRBC BLD AUTO-RTO: 0 /100 WBCS
PLATELET # BLD AUTO: 82 THOUSANDS/UL (ref 149–390)
PLATELET BLD QL SMEAR: ABNORMAL
PMV BLD AUTO: 13.4 FL (ref 8.9–12.7)
POTASSIUM SERPL-SCNC: 4 MMOL/L (ref 3.5–5.3)
PROT SERPL-MCNC: 6.1 G/DL (ref 6.4–8.4)
RBC # BLD AUTO: 4.81 MILLION/UL (ref 3.81–5.12)
RBC MORPH BLD: NORMAL
SODIUM SERPL-SCNC: 136 MMOL/L (ref 135–147)
WBC # BLD AUTO: 9.36 THOUSAND/UL (ref 4.31–10.16)

## 2025-06-04 PROCEDURE — 80053 COMPREHEN METABOLIC PANEL: CPT

## 2025-06-04 PROCEDURE — 36415 COLL VENOUS BLD VENIPUNCTURE: CPT

## 2025-06-04 PROCEDURE — 85025 COMPLETE CBC W/AUTO DIFF WBC: CPT

## 2025-06-04 RX ORDER — TRAMADOL HYDROCHLORIDE 50 MG/1
50 TABLET ORAL 2 TIMES DAILY PRN
Qty: 14 TABLET | Refills: 1 | Status: SHIPPED | OUTPATIENT
Start: 2025-06-04 | End: 2025-06-10 | Stop reason: SDUPTHER

## 2025-06-04 RX ORDER — TRAMADOL HYDROCHLORIDE 50 MG/1
50 TABLET ORAL 2 TIMES DAILY PRN
Qty: 30 TABLET | Refills: 1 | Status: SHIPPED | OUTPATIENT
Start: 2025-06-04 | End: 2025-06-04 | Stop reason: SDUPTHER

## 2025-06-04 NOTE — TELEPHONE ENCOUNTER
[facility] pharmacy called the office to let the provider know that they can not fill pt's Tramadol pills 30 tablets. The first fill would have to be 7 day worth then a refill could be given to pt for 30 tablets. Please review

## 2025-06-04 NOTE — TELEPHONE ENCOUNTER
Pt's spouse stopped in to see if Dr. Dill would prescribe Tramadol for her arthritis. Pt would like this going to [facility] on [address].

## 2025-06-05 ENCOUNTER — RESULTS FOLLOW-UP (OUTPATIENT)
Age: 65
End: 2025-06-05

## 2025-06-06 ENCOUNTER — HOSPITAL ENCOUNTER (OUTPATIENT)
Dept: MRI IMAGING | Facility: HOSPITAL | Age: 65
Discharge: HOME | End: 2025-06-06
Attending: FAMILY MEDICINE
Payer: COMMERCIAL

## 2025-06-06 DIAGNOSIS — K76.9 LIVER LESION: ICD-10-CM

## 2025-06-06 DIAGNOSIS — K74.60 CIRRHOSIS OF LIVER WITHOUT ASCITES, UNSPECIFIED HEPATIC CIRRHOSIS TYPE (HCC): ICD-10-CM

## 2025-06-06 PROCEDURE — 74183 MRI ABD W/O CNTR FLWD CNTR: CPT

## 2025-06-06 PROCEDURE — A9585 GADOBUTROL INJECTION: HCPCS | Performed by: FAMILY MEDICINE

## 2025-06-06 RX ORDER — GADOBUTROL 604.72 MG/ML
10 INJECTION INTRAVENOUS
Status: COMPLETED | OUTPATIENT
Start: 2025-06-06 | End: 2025-06-06

## 2025-06-06 RX ADMIN — GADOBUTROL 10 ML: 604.72 INJECTION INTRAVENOUS at 14:15

## 2025-06-10 ENCOUNTER — OFFICE VISIT (OUTPATIENT)
Dept: FAMILY MEDICINE CLINIC | Facility: CLINIC | Age: 65
End: 2025-06-10
Payer: COMMERCIAL

## 2025-06-10 VITALS
TEMPERATURE: 97.1 F | HEIGHT: 59 IN | SYSTOLIC BLOOD PRESSURE: 136 MMHG | OXYGEN SATURATION: 96 % | WEIGHT: 222 LBS | HEART RATE: 68 BPM | DIASTOLIC BLOOD PRESSURE: 80 MMHG | BODY MASS INDEX: 44.76 KG/M2

## 2025-06-10 DIAGNOSIS — R26.9 GAIT ABNORMALITY: ICD-10-CM

## 2025-06-10 DIAGNOSIS — F11.20 OPIOID DEPENDENCE, UNCOMPLICATED (HCC): ICD-10-CM

## 2025-06-10 DIAGNOSIS — J44.9 CHRONIC OBSTRUCTIVE PULMONARY DISEASE, UNSPECIFIED COPD TYPE (HCC): ICD-10-CM

## 2025-06-10 DIAGNOSIS — M05.79 RHEUMATOID ARTHRITIS INVOLVING MULTIPLE SITES WITH POSITIVE RHEUMATOID FACTOR (HCC): ICD-10-CM

## 2025-06-10 DIAGNOSIS — E11.9 TYPE 2 DIABETES MELLITUS WITHOUT COMPLICATION, WITHOUT LONG-TERM CURRENT USE OF INSULIN (HCC): Primary | ICD-10-CM

## 2025-06-10 DIAGNOSIS — M79.7 FIBROMYALGIA: ICD-10-CM

## 2025-06-10 LAB — SL AMB POCT HEMOGLOBIN AIC: 9.2 (ref ?–6.5)

## 2025-06-10 PROCEDURE — 83036 HEMOGLOBIN GLYCOSYLATED A1C: CPT | Performed by: FAMILY MEDICINE

## 2025-06-10 PROCEDURE — 99214 OFFICE O/P EST MOD 30 MIN: CPT | Performed by: FAMILY MEDICINE

## 2025-06-10 RX ORDER — METFORMIN HYDROCHLORIDE 500 MG/1
500 TABLET, EXTENDED RELEASE ORAL 2 TIMES DAILY WITH MEALS
Qty: 180 TABLET | Refills: 1 | Status: SHIPPED | OUTPATIENT
Start: 2025-06-10

## 2025-06-10 RX ORDER — METFORMIN HYDROCHLORIDE 500 MG/1
500 TABLET, EXTENDED RELEASE ORAL
Qty: 180 TABLET | Refills: 1 | Status: SHIPPED | OUTPATIENT
Start: 2025-06-10 | End: 2025-06-10 | Stop reason: SDUPTHER

## 2025-06-10 RX ORDER — TRAMADOL HYDROCHLORIDE 50 MG/1
50 TABLET ORAL 2 TIMES DAILY PRN
Qty: 60 TABLET | Refills: 1 | Status: SHIPPED | OUTPATIENT
Start: 2025-06-10 | End: 2025-06-17

## 2025-06-10 NOTE — PROGRESS NOTES
Name: Susy Crain      : 1960      MRN: [id number]  Encounter Provider: Cheng Dill DO  Encounter Date: 6/10/2025   Encounter department: [facility]    Assessment & Plan  Type 2 diabetes mellitus without complication, without long-term current use of insulin ([facility])    Lab Results   Component Value Date    HGBA1C 9.2 (A) 06/10/2025   Poor control of diabetes with hemoglobin A1c up to 9.2.  Patient had discontinued her metformin for several months prior to her blood work.  We discussed diabetic diet.  Will also restart metformin  mg twice daily.    Orders:  •  POCT hemoglobin A1c  •  metFORMIN (GLUCOPHAGE-XR) 500 mg 24 hr tablet; Take 1 tablet (500 mg total) by mouth 2 (two) times a day with meals    Fibromyalgia    Orders:  •  Ambulatory referral to Physical Therapy; Future  •  traMADol (Ultram) 50 mg tablet; Take 1 tablet (50 mg total) by mouth 2 (two) times a day as needed for moderate pain for up to 7 days    Rheumatoid arthritis involving multiple sites with positive rheumatoid factor (HCC)    Orders:  •  Ambulatory referral to Physical Therapy; Future  •  traMADol (Ultram) 50 mg tablet; Take 1 tablet (50 mg total) by mouth 2 (two) times a day as needed for moderate pain for up to 7 days    Gait abnormality    Orders:  •  Ambulatory referral to Physical Therapy; Future    Opioid dependence, uncomplicated ([facility])  Prescription for tramadol provided to patient for her chronic pain related to her arthritis.  Not a candidate for NSAIDs due to low platelet levels and risk of bleeding.       Chronic obstructive pulmonary disease, unspecified COPD type (HCC)  Stable.  Not requiring medication at this time.            History of Present Illness     Patient presents to follow-up on recent lab work and to discuss a referral for PT.  She is having difficulty with arthritis and balance issues at times.  She would like to go to [place] for aquatic therapy.  She also notes that she  "has not been taking metformin for at least 3 to 4 months.  She was on a fruit and vegetable diet for approximately 1 month prior to her recent blood work.      Review of Systems   Constitutional: Negative.    Respiratory: Negative.     Cardiovascular: Negative.    Gastrointestinal: Negative.    Genitourinary: Negative.    Musculoskeletal:  Positive for arthralgias and gait problem.   Psychiatric/Behavioral: Negative.       Past Medical History[1]  Past Surgical History[2]  Family History[3]  Social History[4]  Medications[5]  Allergies   Allergen Reactions   • Penicillins    • Wellbutrin [Bupropion]      Immunization History   Administered Date(s) Administered   • COVID-19 MODERNA VACC 0.5 ML IM 04/15/2022   • COVID-19 PFIZER VACCINE 0.3 ML IM 04/09/2021, 05/04/2021   • Hep A, adult 09/24/2023   • Hep B, adult 09/24/2023, 11/01/2023   • Pneumococcal Polysaccharide PPV23 01/28/2019     Objective   /80   Pulse 68   Temp (!) 97.1 °F (36.2 °C)   Ht 4' 11\" (1.499 m)   Wt 101 kg (222 lb)   SpO2 96%   BMI 44.84 kg/m²     Physical Exam  Vitals and nursing note reviewed.   Constitutional:       General: She is not in acute distress.     Appearance: Normal appearance.   HENT:      Head: Normocephalic and atraumatic.     Eyes:      Pupils: Pupils are equal, round, and reactive to light.       Cardiovascular:      Rate and Rhythm: Normal rate and regular rhythm.      Pulses: Normal pulses.      Heart sounds: Normal heart sounds.   Pulmonary:      Effort: Pulmonary effort is normal.      Breath sounds: Normal breath sounds.     Musculoskeletal:         General: Normal range of motion.      Cervical back: Normal range of motion.     Skin:     General: Skin is warm and dry.     Neurological:      General: No focal deficit present.      Mental Status: She is alert and oriented to person, place, and time. Mental status is at baseline.     Psychiatric:         Mood and Affect: Mood normal.         Behavior: Behavior " normal.         Thought Content: Thought content normal.         Judgment: Judgment normal.                [1]  Past Medical History:  Diagnosis Date   • Anxiety    • Arthritis    • Cirrhosis (HCC) ?   • Depression    • Diabetes (HCC)    • Diabetes mellitus (HCC) ?   • Fatty liver ?   • Fibromyalgia    • Fibromyalgia, primary    • GERD (gastroesophageal reflux disease)    • Headache(784.0)    • Hypothyroid    • Obesity    • Pneumonia    • Rheumatoid arthritis (HCC)    • Shortness of breath    • Visual impairment    [2]  Past Surgical History:  Procedure Laterality Date   • KNEE CARTILAGE SURGERY Right    • TRACHEOSTOMY     [3]  Family History  Problem Relation Name Age of Onset   • Dementia Mother Belkis    • Asthma Mother Belkis    • Pneumonia Father Ryan Lobo    • Heart attack Father Ryan Lobo 67   • COPD Father Ryan Lobo         Lungs hardened   • Depression Father Ryan Lobo    • Asthma Brother Davion    • Diabetes Maternal Grandfather     • Cancer Paternal Grandmother Octavia Lobo    • Thyroid cancer Neg Hx     • Pancreatitis Neg Hx     [4]  Social History  Tobacco Use   • Smoking status: Former     Current packs/day: 0.00     Average packs/day: 0.3 packs/day for 10.0 years (2.5 ttl pk-yrs)     Types: Cigarettes     Start date: 1987     Quit date: 1997     Years since quittin.3   • Smokeless tobacco: Never   Vaping Use   • Vaping status: Never Used   Substance and Sexual Activity   • Alcohol use: Not Currently     Alcohol/week: 2.0 standard drinks of alcohol   • Drug use: No   • Sexual activity: Yes     Partners: Male     Birth control/protection: Condom Male, I.U.D., None   [5]  Current Outpatient Medications on File Prior to Visit   Medication Sig   • atorvastatin (LIPITOR) 20 mg tablet Take 1 tablet (20 mg total) by mouth daily   • carvedilol (COREG) 6.25 mg tablet Take 1 tablet (6.25 mg total) by mouth 2 (two) times a day with meals   • DULoxetine  (CYMBALTA) 30 mg delayed release capsule Take 1 capsule (30 mg total) by mouth daily   • hydroxychloroquine (PLAQUENIL) 200 mg tablet TAKE 1 TABLET BY MOUTH 2 TIMES A DAY WITH MEALS   • ondansetron (ZOFRAN-ODT) 4 mg disintegrating tablet Take 1 tablet (4 mg total) by mouth every 12 (twelve) hours as needed for nausea or vomiting   • Vitamin D, Cholecalciferol, 25 MCG (1000 UT) TABS Take 2 tablets by mouth once daily   • [DISCONTINUED] traMADol (Ultram) 50 mg tablet Take 1 tablet (50 mg total) by mouth 2 (two) times a day as needed for moderate pain for up to 7 days   • albuterol (PROVENTIL HFA,VENTOLIN HFA) 90 mcg/act inhaler Inhale 1 puff every 6 (six) hours as needed (cough)   • [DISCONTINUED] metFORMIN (GLUCOPHAGE) 500 mg tablet take 1 tablet by mouth twice a day with meals (Patient not taking: Reported on 4/16/2025)   • [DISCONTINUED] methylPREDNISolone 4 MG tablet therapy pack Use as directed on package   • [DISCONTINUED] omeprazole (PriLOSEC) 40 MG capsule Take 1 capsule (40 mg total) by mouth daily

## 2025-06-10 NOTE — ASSESSMENT & PLAN NOTE
Orders:  •  Ambulatory referral to Physical Therapy; Future  •  traMADol (Ultram) 50 mg tablet; Take 1 tablet (50 mg total) by mouth 2 (two) times a day as needed for moderate pain for up to 7 days

## 2025-06-10 NOTE — ASSESSMENT & PLAN NOTE
Prescription for tramadol provided to patient for her chronic pain related to her arthritis.  Not a candidate for NSAIDs due to low platelet levels and risk of bleeding.

## 2025-06-10 NOTE — ASSESSMENT & PLAN NOTE
Lab Results   Component Value Date    HGBA1C 9.2 (A) 06/10/2025   Poor control of diabetes with hemoglobin A1c up to 9.2.  Patient had discontinued her metformin for several months prior to her blood work.  We discussed diabetic diet.  Will also restart metformin  mg twice daily.    Orders:  •  POCT hemoglobin A1c  •  metFORMIN (GLUCOPHAGE-XR) 500 mg 24 hr tablet; Take 1 tablet (500 mg total) by mouth 2 (two) times a day with meals

## 2025-06-25 ENCOUNTER — DOCUMENTATION (OUTPATIENT)
Dept: HEMATOLOGY ONCOLOGY | Facility: CLINIC | Age: 65
End: 2025-06-25

## 2025-06-25 NOTE — PROGRESS NOTES
In-basket message received from Corin Gayle PA-C to add patient to the liver [facility]CC on 6/27/2025. Chart reviewed and prep completed.

## 2025-06-27 ENCOUNTER — DOCUMENTATION (OUTPATIENT)
Dept: HEMATOLOGY ONCOLOGY | Facility: CLINIC | Age: 65
End: 2025-06-27

## 2025-06-27 ENCOUNTER — TELEPHONE (OUTPATIENT)
Age: 65
End: 2025-06-27

## 2025-06-27 DIAGNOSIS — K74.60 CIRRHOSIS OF LIVER WITHOUT ASCITES, UNSPECIFIED HEPATIC CIRRHOSIS TYPE (HCC): Primary | ICD-10-CM

## 2025-06-27 DIAGNOSIS — K76.9 LIVER LESION: ICD-10-CM

## 2025-06-27 NOTE — PROGRESS NOTES
LIVER [facility] NOTE      NCCN guidelines were readily available for review at this discussion    DATE:  6/27/2025    PRESENTING DOCTOR: Corin Gayle PA-C    DIAGNOSIS:  LR 4 lesion  STAGING: N/A  REASON FOR DISCUSSION: Review for Treatment Planning    Susy Crain is a 65 y.o. female who was presented at the Oncology Multidisciplinary Tumor Conference today. 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 Longstanding history of abnormal LFTs and noted to have hepatomegaly with slight heterogeneous liver parenchyma on US.MRI obtained on 6/6/25 showing progression of her previous LR3 observations now being categorized as LR4. Being discussed at tumor board for further recommendations.     Imaging/Studies reviewed:   []CT scan  [x]MRI 10/28/24 & 6/6/25    Pertinent Labs:  10/27/24 AFP 5.47    Pathology: NA    PHYSICIAN RECOMMENDED PLAN:  Recommend referral to IR for both targeted biopsy with liver ablation.   Refer to [facility] for transplant evaluation.     All specialty physicians & supportive services available.  Providers contributing to final recommendations noted below.    GI [x]    IR: [x]    Hepatology: [x]    HemOnc: []    RadOnc: []    SurgOnc: []    Clinical Trials: Patient reviewed and not a candidate at this time for a clinical trial at [facility] as no diagnosis at this time.    Genetics: not reviewed    Financial Advocacy: not reviewed    Social Work: not reviewed    Future Appointments   Date Time Provider Department Center   9/12/2025  2:30 PM Cheng Dill DO [facility]   10/13/2025  3:15 PM Raisa Kessler MD [facility]th [facility]   10/22/2025  2:30 PM Corin Gayle PA-C GI [facility] 8th [facility]      Team agreed to plan.The final treatment plan will be left to the discretion of the patient and the treating physician.     DISCLAIMERS:  TO THE TREATING PHYSICIAN:  This conference is a meeting of clinicians from various specialty areas who evaluate and  discuss patients for whom a multidisciplinary treatment approach is being considered. Please note that the above opinion was a consensus of the conference attendees and is intended only to assist in quality care of the discussed patient.  The responsibility for follow up on the input given during the conference, along with any final decisions regarding plan of care, is that of the patient and the patient's provider. Accordingly, appointments have only been recommended based on this information and have NOT been scheduled unless otherwise noted.      TO THE PATIENT:  This summary is a brief record of major aspects of your cancer treatment. You may choose to share a copy with any of your doctors or nurses. However, this is not a detailed or comprehensive record of your care.

## 2025-06-27 NOTE — TELEPHONE ENCOUNTER
Referral order placed to [facility] for LT eval for MASH cirrhosis and possible HCC.

## 2025-06-27 NOTE — TELEPHONE ENCOUNTER
Called and spoke with patient regarding the outcome of today's liver tumor board. Plan to refer to IR for both targeted biopsy and ablation as well as [facility] for transplant evaluation (within Copen criteria/meld exception points for suspected HCC).    She also mentions some mental fogginess, mild bilateral lower extremity swelling and hair thinning. No additional alarm features. Plan to see her in office next week to further evaluate the symptoms - concerns for early HE. She was given strict ED precautions heading into the weekend.

## 2025-07-01 ENCOUNTER — APPOINTMENT (OUTPATIENT)
Dept: LAB | Facility: CLINIC | Age: 65
End: 2025-07-01
Payer: COMMERCIAL

## 2025-07-01 ENCOUNTER — TRANSCRIBE ORDERS (OUTPATIENT)
Age: 65
End: 2025-07-01

## 2025-07-01 DIAGNOSIS — K74.60 CIRRHOSIS OF LIVER WITHOUT ASCITES, UNSPECIFIED HEPATIC CIRRHOSIS TYPE (HCC): ICD-10-CM

## 2025-07-01 DIAGNOSIS — K76.9 LIVER LESION: ICD-10-CM

## 2025-07-01 LAB
AFP-TM SERPL-MCNC: 7.42 NG/ML (ref 0–9)
ALBUMIN SERPL BCG-MCNC: 3.9 G/DL (ref 3.5–5)
ALP SERPL-CCNC: 162 U/L (ref 34–104)
ALT SERPL W P-5'-P-CCNC: 35 U/L (ref 7–52)
ANION GAP SERPL CALCULATED.3IONS-SCNC: 11 MMOL/L (ref 4–13)
AST SERPL W P-5'-P-CCNC: 29 U/L (ref 13–39)
BASOPHILS # BLD AUTO: 0.07 THOUSANDS/ÂΜL (ref 0–0.1)
BASOPHILS NFR BLD AUTO: 1 % (ref 0–1)
BILIRUB SERPL-MCNC: 1.28 MG/DL (ref 0.2–1)
BUN SERPL-MCNC: 13 MG/DL (ref 5–25)
CALCIUM SERPL-MCNC: 9.2 MG/DL (ref 8.4–10.2)
CHLORIDE SERPL-SCNC: 101 MMOL/L (ref 96–108)
CO2 SERPL-SCNC: 26 MMOL/L (ref 21–32)
CREAT SERPL-MCNC: 0.58 MG/DL (ref 0.6–1.3)
EOSINOPHIL # BLD AUTO: 0.1 THOUSAND/ÂΜL (ref 0–0.61)
EOSINOPHIL NFR BLD AUTO: 1 % (ref 0–6)
ERYTHROCYTE [DISTWIDTH] IN BLOOD BY AUTOMATED COUNT: 13 % (ref 11.6–15.1)
GFR SERPL CREATININE-BSD FRML MDRD: 97 ML/MIN/1.73SQ M
GLUCOSE SERPL-MCNC: 265 MG/DL (ref 65–140)
HCT VFR BLD AUTO: 44.1 % (ref 34.8–46.1)
HGB BLD-MCNC: 15 G/DL (ref 11.5–15.4)
IMM GRANULOCYTES # BLD AUTO: 0.05 THOUSAND/UL (ref 0–0.2)
IMM GRANULOCYTES NFR BLD AUTO: 1 % (ref 0–2)
INR PPP: 1.08 (ref 0.85–1.19)
LYMPHOCYTES # BLD AUTO: 1.65 THOUSANDS/ÂΜL (ref 0.6–4.47)
LYMPHOCYTES NFR BLD AUTO: 20 % (ref 14–44)
MCH RBC QN AUTO: 30.6 PG (ref 26.8–34.3)
MCHC RBC AUTO-ENTMCNC: 34 G/DL (ref 31.4–37.4)
MCV RBC AUTO: 90 FL (ref 82–98)
MONOCYTES # BLD AUTO: 0.59 THOUSAND/ÂΜL (ref 0.17–1.22)
MONOCYTES NFR BLD AUTO: 7 % (ref 4–12)
NEUTROPHILS # BLD AUTO: 5.99 THOUSANDS/ÂΜL (ref 1.85–7.62)
NEUTS SEG NFR BLD AUTO: 70 % (ref 43–75)
NRBC BLD AUTO-RTO: 0 /100 WBCS
PLATELET # BLD AUTO: 102 THOUSANDS/UL (ref 149–390)
PMV BLD AUTO: 13.4 FL (ref 8.9–12.7)
POTASSIUM SERPL-SCNC: 4.1 MMOL/L (ref 3.5–5.3)
PROT SERPL-MCNC: 6.5 G/DL (ref 6.4–8.4)
PROTHROMBIN TIME: 14.3 SECONDS (ref 12.3–15)
RBC # BLD AUTO: 4.9 MILLION/UL (ref 3.81–5.12)
SODIUM SERPL-SCNC: 138 MMOL/L (ref 135–147)
WBC # BLD AUTO: 8.45 THOUSAND/UL (ref 4.31–10.16)

## 2025-07-01 PROCEDURE — 80053 COMPREHEN METABOLIC PANEL: CPT

## 2025-07-01 PROCEDURE — 85610 PROTHROMBIN TIME: CPT

## 2025-07-01 PROCEDURE — 85025 COMPLETE CBC W/AUTO DIFF WBC: CPT

## 2025-07-01 PROCEDURE — 82105 ALPHA-FETOPROTEIN SERUM: CPT

## 2025-07-01 PROCEDURE — 36415 COLL VENOUS BLD VENIPUNCTURE: CPT

## 2025-07-01 NOTE — TELEPHONE ENCOUNTER
Initial documents sent to [facility] at  for liver transplant eval. Fax confirmation received and scanned into chart.

## 2025-07-01 NOTE — TELEPHONE ENCOUNTER
Called and spoke to patient and spouse regarding appt tomorrow with Corin Gayle. Patient scheduled at 11:30 per Corin. Let patient know to have lab drawn for appt tomorrow.

## 2025-07-02 ENCOUNTER — OFFICE VISIT (OUTPATIENT)
Age: 65
End: 2025-07-02
Payer: COMMERCIAL

## 2025-07-02 VITALS
WEIGHT: 219 LBS | HEIGHT: 59 IN | SYSTOLIC BLOOD PRESSURE: 112 MMHG | BODY MASS INDEX: 44.15 KG/M2 | DIASTOLIC BLOOD PRESSURE: 78 MMHG | HEART RATE: 68 BPM

## 2025-07-02 DIAGNOSIS — K21.9 GASTROESOPHAGEAL REFLUX DISEASE, UNSPECIFIED WHETHER ESOPHAGITIS PRESENT: ICD-10-CM

## 2025-07-02 DIAGNOSIS — K75.81 METABOLIC DYSFUNCTION-ASSOCIATED STEATOHEPATITIS (MASH): ICD-10-CM

## 2025-07-02 DIAGNOSIS — R11.0 NAUSEA: ICD-10-CM

## 2025-07-02 DIAGNOSIS — K76.9 LIVER LESION: ICD-10-CM

## 2025-07-02 DIAGNOSIS — I85.00 ESOPHAGEAL VARICES DETERMINED BY ENDOSCOPY (HCC): ICD-10-CM

## 2025-07-02 DIAGNOSIS — Z86.19 HISTORY OF HELICOBACTER PYLORI INFECTION: ICD-10-CM

## 2025-07-02 DIAGNOSIS — K74.60 CIRRHOSIS OF LIVER WITHOUT ASCITES, UNSPECIFIED HEPATIC CIRRHOSIS TYPE (HCC): Primary | ICD-10-CM

## 2025-07-02 PROCEDURE — 99214 OFFICE O/P EST MOD 30 MIN: CPT | Performed by: FAMILY MEDICINE

## 2025-07-02 RX ORDER — LACTULOSE 10 G/15ML
20 SOLUTION ORAL 2 TIMES DAILY
Qty: 1800 ML | Refills: 2 | Status: SHIPPED | OUTPATIENT
Start: 2025-07-02 | End: 2025-09-30

## 2025-07-02 RX ORDER — OMEPRAZOLE 40 MG/1
40 CAPSULE, DELAYED RELEASE ORAL DAILY
COMMUNITY

## 2025-07-02 NOTE — PROGRESS NOTES
Name: Susy Crain      : 1960      MRN: [id number]  Encounter Provider: Corin Gayle PA-C  Encounter Date: 2025   Encounter department: [facility] [address]  :  Assessment & Plan  Cirrhosis of liver without ascites, unspecified hepatic cirrhosis type (HCC)  Metabolic dysfunction-associated steatohepatitis (MASH)  Esophageal varices determined by endoscopy (HCC)  Liver lesion  Summary: Patient is a 64 y.o. 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 Current MELD-3.0 (9).     Patient with a longstanding history of abnormal liver function test noted to have hepatomegaly and slight heterogeneous liver parenchyma on ultrasound. She developed chronic thrombocytopenia and subsequently had a US elastography demonstrating F4 disease. Etiology was felt to be MASH given metabolic risk factors, prior methotrexate use and negative serologic workup.     Since her last appointment, she had an MRI/MRCP which showed progression of her previously noted LR3 observations now categorized as a 15 mm segment 7 and 14 mm segment 6 LR4 observation. Also noted to have a slightly enlarged 8 mm segment 4A LR 3 observation.  Her case was discussed at  tumor board and she is planned for a biopsy/ablation with IR.    Today, she also reports concerns regarding confusion. Describes word finding difficulties and forgetfulness. She is AAOx 3 without asterixis on exam. Low suspicion that this is related to HEENT but will try lactulose to see if this improves cognition. She was advised that if she does not see improvement within a month she can stop lactulose.    We also reviewed her most recent labs which show overall stable LFTs and a normal AFP. She will be due for updated MELD labs in October and for an MRI/MRCP approximately 3 months after treatment for surveillance.  This has been ordered but not scheduled. Additional management  as below.     Ascites   - None      Esophageal Varices   - EGD (9/2023) with multiple large esophageal varices and PHG.   - Continue carvedilol 6.25 mg twice daily for primary ppx.   - No further EGDs necessary for variceal screening while on an NSBB barring a decompensating event.     Hepatic Encephalopathy   - Reports increased word finding difficultly and forgetfulness.   - AAOx3 w/o asterixis; Low suspicion sxs's are related to HE.   - Trial lactulose to titrate for a goal of 3-4 soft but formed BM daily but may stop medication if no improvement in sxs's after 1 month.   - Patient aware of signs/sxs's of HE and advised to promptly inform provider if experiencing such.     HCC Screening   - MRI/MRCP (6/6/2024) showing progression of her previously noted LR3 observations now categorized as a 15 mm segment 7 and 14 mm segment 6 LR4 observation. AFP normal.  - Plan for biopsy/ablation via IR.   - Referred to [facility] for LTE given w/in Silvano and MELD exception point criteria if bx proves HCC.   - Repeat MRI/MRCP approx 3 months after ablation.      Nutritional Status  - No sarcopenia noted on exam.   - Encouraged high-protein diet in addition to a high-protein snack qHS to prevent prolonged fasting.      Transplant Candidacy   - Defer given compensated disease and low MELD score.      CRC Screening  - Cologuard (3/2025) normal.   - Repeat q3 years.     Orders:  •  lactulose (CHRONULAC) 10 g/15 mL solution; Take 30 mL (20 g total) by mouth 2 (two) times a day  •  CBC and differential; Future  •  Comprehensive metabolic panel; Future  •  Protime-INR; Future  •  MRI abdomen w wo contrast and mrcp; Future    Gastroesophageal reflux disease, unspecified whether esophagitis present  History of Helicobacter pylori infection  Nausea  Patient did not complete H pylori stool Ag or begin PPI. Therefore, A/P remain the same    Patient reports daily symptoms including a sour taste in the back of her throat and cough that wakes  her from sleep, new within the last few months, but denies overt reflux. No new medications or lifestyle changes correlating with sxs onset. No additional upper/lower GI alarm features. She has not tried any OTC or prescription medications to alleviate symptoms.     Most suspicious that symptoms are related to atypical GERD.  Recommended trial of omeprazole 40 mg once daily.  Discussed optimization of PPI and dietary/lifestyle modifications to prevent reflux.  If with persistent symptoms despite PPI use then would recommend an EGD with gastric biopsies for further evaluation.       Follow-up in 3 months or sooner if necessary.       History of Present Illness   HPI    Susy Crain is a 65 y.o. female with PMH significant for obesity, DM2, RA, COPD and GERD who presents today for a follow-up regarding MASH cirrhosis.    Interval history   - Last seen 4/16/2025.   - MRI/MRCP (6/6/2025) showing progression of her previously noted LR3 observations now categorized as a 15 mm segment 7 and 14 mm segment 6 LR4 observation. Also noted to have a slightly enlarged 8 mm segment 4A LR 3 observation.   - Discussed at  liver tumor board and planned for biopsy/ablation with IR.   - Presents today for concerns regarding confusion(?HE).   - Also reports chronic nausea.     Her and her s/o are responsible for caring for her COLIN who has frontotemporal dementia.     MELD 3.0: 9 at 7/1/2025  1:32 PM  MELD-Na: 8 at 7/1/2025  1:32 PM  Calculated from:  Serum Creatinine: 0.58 mg/dL (Using min of 1 mg/dL) at 7/1/2025  1:32 PM  Serum Sodium: 138 mmol/L (Using max of 137 mmol/L) at 7/1/2025  1:32 PM  Total Bilirubin: 1.28 mg/dL at 7/1/2025  1:32 PM  Serum Albumin: 3.9 g/dL (Using max of 3.5 g/dL) at 7/1/2025  1:32 PM  INR(ratio): 1.08 at 7/1/2025  1:32 PM  Age at listing (hypothetical): 65 years  Sex: Female at 7/1/2025  1:32 PM    Extended liver history   Patient is familiar to [facility] previously seen by Dr. Garner in 2020 for abnormal liver  function tests noted to have hepatomegaly and slightly heterogeneous liver parenchyma on ultrasound. U/S elastography was notable for F3 fibrosis. She had a serologic evaluation negative for autoimmune hepatitis, A1AT deficiency, Denver's disease and hemochromatosis. She was noted to have a positive hepatitis B core Ab IgM but with a negative core total antibody, surface antigen, surface antibody and HBV DNA. Also HCV negative.     She has had persistently abnormal liver function tests, of mixed type with most recent hepatic function panel noting AST 64, ALT 77, alk phos 196, T. bili 1.4, and thrombocytopenia prompting an abdominal ultrasound notable for cirrhotic morphology without evidence of portal hypertension. US elastography was notable for F4 fibrosis.     Denies a family history of liver disease or liver cancer. She does have autoimmune conditions including rheumatoid arthritis for which she was taking methotrexate x2 years but stopped 3 months prior. Denies EtOH use.     Denies signs or symptoms of ascites, bleeding esophageal varices or hepatic encephalopathy.    MRI/MRCP (10/28/2024) with a stable  0.9 x 1.2 cm segment 6, 1.0 x 0.9 segment 7 and 0.6 cm segment 7 LR 3 observation.    Cologuard (3/20/2025) was negative.      History obtained from: patient    Review of Systems   All other systems reviewed and are negative.    Pertinent Medical History     Medical History Reviewed by provider this encounter:  Tobacco  Allergies  Meds  Problems  Med Hx  Surg Hx  Fam Hx     .  Past Medical History   Past Medical History:   Diagnosis Date   • Anxiety    • Arthritis    • Cirrhosis (HCC) ?   • Depression    • Diabetes (HCC)    • Diabetes mellitus (HCC) ?   • Fatty liver ?   • Fibromyalgia    • Fibromyalgia, primary 1991   • GERD (gastroesophageal reflux disease)    • Headache(784.0)    • Hypothyroid    • Obesity    • Pneumonia    • Rheumatoid arthritis (HCC) 2020   • Shortness of breath    • Visual  impairment      Past Surgical History:   Procedure Laterality Date   • KNEE CARTILAGE SURGERY Right    • TRACHEOSTOMY       Family History   Problem Relation Name Age of Onset   • Dementia Mother Belkis    • Asthma Mother Belkis    • Pneumonia Father Ryan Lobo    • Heart attack Father Ryan Lobo 67   • COPD Father Ryan Lobo         Lungs hardened   • Depression Father Ryan Lobo    • Asthma Brother Davion    • Diabetes Maternal Grandfather     • Cancer Paternal Grandmother Octavia Lobo    • Thyroid cancer Neg Hx     • Pancreatitis Neg Hx        reports that she quit smoking about 28 years ago. Her smoking use included cigarettes. She started smoking about 38 years ago. She has a 2.5 pack-year smoking history. She has never used smokeless tobacco. She reports that she does not currently use alcohol after a past usage of about 2.0 standard drinks of alcohol per week. She reports that she does not use drugs.  Current Outpatient Medications   Medication Instructions   • albuterol (PROVENTIL HFA,VENTOLIN HFA) 90 mcg/act inhaler 1 puff, Inhalation, Every 6 hours PRN   • atorvastatin (LIPITOR) 20 mg, Oral, Daily   • carvedilol (COREG) 6.25 mg, Oral, 2 times daily with meals   • DULoxetine (CYMBALTA) 30 mg, Oral, Daily   • hydroxychloroquine (PLAQUENIL) 200 mg tablet TAKE 1 TABLET BY MOUTH 2 TIMES A DAY WITH MEALS   • lactulose (CHRONULAC) 20 g, Oral, 2 times daily   • metFORMIN (GLUCOPHAGE-XR) 500 mg, Oral, 2 times daily with meals   • omeprazole (PRILOSEC) 40 mg, Daily   • ondansetron (ZOFRAN-ODT) 4 mg, Oral, Every 12 hours PRN   • Vitamin D (Cholecalciferol) 2,000 Units, Oral, Daily     Allergies   Allergen Reactions   • Penicillins    • Wellbutrin [Bupropion]       Current Outpatient Medications on File Prior to Visit   Medication Sig Dispense Refill   • albuterol (PROVENTIL HFA,VENTOLIN HFA) 90 mcg/act inhaler Inhale 1 puff every 6 (six) hours as needed (cough) 8 g 0   • atorvastatin  "(LIPITOR) 20 mg tablet Take 1 tablet (20 mg total) by mouth daily 90 tablet 0   • carvedilol (COREG) 6.25 mg tablet Take 1 tablet (6.25 mg total) by mouth 2 (two) times a day with meals 180 tablet 3   • DULoxetine (CYMBALTA) 30 mg delayed release capsule Take 1 capsule (30 mg total) by mouth daily 30 capsule 2   • hydroxychloroquine (PLAQUENIL) 200 mg tablet TAKE 1 TABLET BY MOUTH 2 TIMES A DAY WITH MEALS 200 tablet 3   • metFORMIN (GLUCOPHAGE-XR) 500 mg 24 hr tablet Take 1 tablet (500 mg total) by mouth 2 (two) times a day with meals 180 tablet 1   • omeprazole (PriLOSEC) 40 MG capsule Take 40 mg by mouth daily     • ondansetron (ZOFRAN-ODT) 4 mg disintegrating tablet Take 1 tablet (4 mg total) by mouth every 12 (twelve) hours as needed for nausea or vomiting 20 tablet 0   • Vitamin D, Cholecalciferol, 25 MCG (1000 UT) TABS Take 2 tablets by mouth once daily 180 tablet 0     No current facility-administered medications on file prior to visit.      Social History     Tobacco Use   • Smoking status: Former     Current packs/day: 0.00     Average packs/day: 0.3 packs/day for 10.0 years (2.5 ttl pk-yrs)     Types: Cigarettes     Start date: 1987     Quit date: 1997     Years since quittin.4   • Smokeless tobacco: Never   Vaping Use   • Vaping status: Never Used   Substance and Sexual Activity   • Alcohol use: Not Currently     Alcohol/week: 2.0 standard drinks of alcohol   • Drug use: No   • Sexual activity: Yes     Partners: Male     Birth control/protection: Condom Male, I.U.D., None        Objective   /78 (BP Location: Left arm, Patient Position: Sitting, Cuff Size: Extra-Large)   Pulse 68   Ht 4' 11\" (1.499 m)   Wt 99.3 kg (219 lb)   BMI 44.23 kg/m²      Physical Exam  Vitals and nursing note reviewed.   Constitutional:       General: She is not in acute distress.     Appearance: Normal appearance. She is well-developed. She is not ill-appearing or toxic-appearing.   HENT:      Head: " Normocephalic and atraumatic.     Eyes:      General: No scleral icterus.     Conjunctiva/sclera: Conjunctivae normal.     Pulmonary:      Effort: Pulmonary effort is normal.   Abdominal:      General: Bowel sounds are normal. There is no distension.      Palpations: Abdomen is soft. There is no mass.      Tenderness: There is no abdominal tenderness.     Musculoskeletal:      Right lower leg: No edema.      Left lower leg: No edema.     Skin:     General: Skin is warm and dry.      Coloration: Skin is not jaundiced.      Findings: No bruising or rash.     Neurological:      Mental Status: She is alert and oriented to person, place, and time. Mental status is at baseline.     Psychiatric:         Mood and Affect: Mood normal.         Behavior: Behavior normal.

## 2025-07-03 NOTE — ASSESSMENT & PLAN NOTE
Summary: Patient is a 64 y.o. 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 Current MELD-3.0 (9).     Patient with a longstanding history of abnormal liver function test noted to have hepatomegaly and slight heterogeneous liver parenchyma on ultrasound. She developed chronic thrombocytopenia and subsequently had a US elastography demonstrating F4 disease. Etiology was felt to be MASH given metabolic risk factors, prior methotrexate use and negative serologic workup.     Since her last appointment, she had an MRI/MRCP which showed progression of her previously noted LR3 observations now categorized as a 15 mm segment 7 and 14 mm segment 6 LR4 observation. Also noted to have a slightly enlarged 8 mm segment 4A LR 3 observation.  Her case was discussed at  tumor board and she is planned for a biopsy/ablation with IR.    Today, she also reports concerns regarding confusion. Describes word finding difficulties and forgetfulness. She is AAOx 3 without asterixis on exam. Low suspicion that this is related to HEENT but will try lactulose to see if this improves cognition. She was advised that if she does not see improvement within a month she can stop lactulose.    We also reviewed her most recent labs which show overall stable LFTs and a normal AFP. She will be due for updated MELD labs in October and for an MRI/MRCP approximately 3 months after treatment for surveillance.  This has been ordered but not scheduled. Additional management as below.     Ascites   - None      Esophageal Varices   - EGD (9/2023) with multiple large esophageal varices and PHG.   - Continue carvedilol 6.25 mg twice daily for primary ppx.   - No further EGDs necessary for variceal screening while on an NSBB barring a decompensating event.     Hepatic Encephalopathy   - Reports increased word finding difficultly and forgetfulness.   - AAOx3 w/o asterixis; Low suspicion sxs's are  related to HE.   - Trial lactulose to titrate for a goal of 3-4 soft but formed BM daily but may stop medication if no improvement in sxs's after 1 month.   - Patient aware of signs/sxs's of HE and advised to promptly inform provider if experiencing such.     HCC Screening   - MRI/MRCP (6/6/2024) showing progression of her previously noted LR3 observations now categorized as a 15 mm segment 7 and 14 mm segment 6 LR4 observation. AFP normal.  - Plan for biopsy/ablation via IR.   - [facility] given w/in Silvano and MELD exception point criteria if bx proves HCC.   - Repeat MRI/MRCP approx 3 months after ablation.      Nutritional Status  - No sarcopenia noted on exam.   - Encouraged high-protein diet in addition to a high-protein snack qHS to prevent prolonged fasting.      Transplant Candidacy   - Defer given compensated disease and low MELD score.      CRC Screening  - Cologuard (3/2025) normal.   - Repeat q3 years.     Orders:  •  lactulose (CHRONULAC) 10 g/15 mL solution; Take 30 mL (20 g total) by mouth 2 (two) times a day  •  CBC and differential; Future  •  Comprehensive metabolic panel; Future  •  Protime-INR; Future  •  MRI abdomen w wo contrast and mrcp; Future

## 2025-07-15 ENCOUNTER — TELEMEDICINE (OUTPATIENT)
Dept: INTERVENTIONAL RADIOLOGY/VASCULAR | Facility: CLINIC | Age: 65
End: 2025-07-15
Payer: COMMERCIAL

## 2025-07-15 DIAGNOSIS — K76.9 LIVER LESION: Primary | ICD-10-CM

## 2025-07-15 DIAGNOSIS — K74.60 CIRRHOSIS OF LIVER WITHOUT ASCITES, UNSPECIFIED HEPATIC CIRRHOSIS TYPE (HCC): ICD-10-CM

## 2025-07-15 PROCEDURE — 99202 OFFICE O/P NEW SF 15 MIN: CPT | Performed by: INTERNAL MEDICINE
